# Patient Record
Sex: FEMALE | Race: WHITE | Employment: OTHER | URBAN - METROPOLITAN AREA
[De-identification: names, ages, dates, MRNs, and addresses within clinical notes are randomized per-mention and may not be internally consistent; named-entity substitution may affect disease eponyms.]

---

## 2018-08-07 ENCOUNTER — OFFICE VISIT (OUTPATIENT)
Dept: AUDIOLOGY | Facility: CLINIC | Age: 73
End: 2018-08-07
Payer: MEDICARE

## 2018-08-07 DIAGNOSIS — H90.3 SENSORINEURAL HEARING LOSS, BILATERAL: Primary | ICD-10-CM

## 2018-08-07 PROCEDURE — 92567 TYMPANOMETRY: CPT | Performed by: AUDIOLOGIST

## 2018-08-07 PROCEDURE — 92557 COMPREHENSIVE HEARING TEST: CPT | Performed by: AUDIOLOGIST

## 2018-08-07 PROCEDURE — 92593 HEARING AID CHECK, BOTH EARS: CPT | Performed by: AUDIOLOGIST

## 2018-08-08 NOTE — PROGRESS NOTES
AUDIOGRAM     Gwen Segovia was referred for testing by Dr. Trice Lucero  7/22/1945  [id number]        History  Patient has been wearing hearing aids since 2014. She admits to not wearing consistently.    Recently she has been hearing an \"echo\" in her left e aids  Cleaned earmolds  Suctioned microphone ports  Suctioned  ports  Placed aids in /dehumidifier  Changed wax guards  Cleaned battery doors and contacts    [facility] Ear testing revealed excellent aided benefit for both ear    Listening check

## 2018-11-18 NOTE — LETTER
1/16/2024      Real Razo MD  Physical Medicine and Rehabilitation  [address] [place] [address], [address]  [facility] [address]  Dept: [phone]  Dept Fax: [phone]        RE: Consultation for Alejandra Green        Dear Erin Lyles MD,    Thank you very much for the opportunity to see your patient.  Attached please find a summary from your patient's recent visit.     I appreciate the chance to take care of your patient with you.  Please feel free to call me with any questions or concerns.    Sincerely,        Real Razo MD  Electronically Signed on 1/16/2024        6

## 2024-01-08 ENCOUNTER — MED REC SCAN ONLY (OUTPATIENT)
Dept: PHYSICAL MEDICINE AND REHAB | Facility: CLINIC | Age: 79
End: 2024-01-08

## 2024-01-08 ENCOUNTER — TELEPHONE (OUTPATIENT)
Dept: PHYSICAL MEDICINE AND REHAB | Facility: CLINIC | Age: 79
End: 2024-01-08

## 2024-01-16 ENCOUNTER — TELEPHONE (OUTPATIENT)
Dept: PHYSICAL MEDICINE AND REHAB | Facility: CLINIC | Age: 79
End: 2024-01-16

## 2024-01-16 ENCOUNTER — OFFICE VISIT (OUTPATIENT)
Dept: PHYSICAL MEDICINE AND REHAB | Facility: CLINIC | Age: 79
End: 2024-01-16
Payer: MEDICARE

## 2024-01-16 DIAGNOSIS — M48.02 CERVICAL STENOSIS OF SPINE: ICD-10-CM

## 2024-01-16 DIAGNOSIS — M50.90 CERVICAL DISC DISEASE: ICD-10-CM

## 2024-01-16 DIAGNOSIS — M54.2 NECK PAIN ON RIGHT SIDE: ICD-10-CM

## 2024-01-16 DIAGNOSIS — M47.812 ARTHROPATHY OF CERVICAL FACET JOINT: Primary | ICD-10-CM

## 2024-01-16 DIAGNOSIS — Q76.1 CERVICAL FUSION SYNDROME: ICD-10-CM

## 2024-01-16 DIAGNOSIS — G44.86 CERVICOGENIC HEADACHE: ICD-10-CM

## 2024-01-16 PROCEDURE — 99205 OFFICE O/P NEW HI 60 MIN: CPT | Performed by: PHYSICAL MEDICINE & REHABILITATION

## 2024-01-16 NOTE — PATIENT INSTRUCTIONS
Plan  I will do right C2-3 and C3-4 z-joint injections.    She will get into the PT.    If the above does not help, then she might need to have a C7-T1 ILESI again.    The patient will follow up in 2-3 months, but the patient will call me 2 weeks after having the injection to let me know how the injection worked.    The total office visit face-to-face visit time was at least 60 minutes with over half of the time spent discussing patient care and treatment.

## 2024-01-16 NOTE — TELEPHONE ENCOUNTER
Patient has been scheduled for  Right C2-3 and C3-4 z-joint injections  on 1/26/2024 at the [facility] with .   -Anesthesia type: local.  -If scheduling [facility] covid testing required for all procedures whether patient is vaccinated or not.  -Patient informed not to eat or drink anything after midnight the night prior to the procedure, if being sedated. (For afternoon injections: Patient to fast 6-8 hours prior to procedure with IVCS/MAC. )  -Patient was advised that if he/she does receive the covid vaccine it needs to be at least 2 weeks before or after the injection.  -Medications and allergies reviewed.  -Patient reminded to hold NSAIDs (Ibuprofen, ASA 81, Aleve, Naproxen, Mobic, Diclofenac, Etodolac, Celebrex etc.) for 3 days prior to LUMBAR FACET JOINT INJECTIONS OR MEDIAL BRANCH BLOCK INJECTIONS  if BMI is greater than 35. For Cervical injections only hold multivitamins, Vitamin E, Fish Oil, Phentermine (Lomaira) for 7 days prior to injection and NSAIDS.   mg to be held for 7 days prior to injections.  -If patient is receiving MAC/IVCS Phentermine (Lomaira), Adlyxin (Lixisenatide), Bydureon BCise (Exenatide-release), Byetta (Exenatide), Mounjaro (Tirezepatide), Ozempic (Semaglutide), Rybelsus (oral demaglutide), Saxenda (Liraglutide), Trulicity (Dulaglutide), Victoriza (Liraglutide), Wegovy (Semaglutide), Berberine (oral natures ozempic) will need to be held for 7 days prior to injection.  -If patient's BMI is greater than 50. Patient is unable to get IVCS/MAC at [facility]. (Options: Patient can go to [facility] under MAC or ENDO under IVCS-reminder physician's slots at ENDO are limited. OR Patient can have the procedure done under local anesthesia at [facility] with Valium ( per physician's preference.)    -If on blood thinner clearance has been received to hold this medication by provider.   -Patient informed he/she will need a  to and from procedure. EFFECTIVE 12/1/23- Per [facility]: \" Our PAT team will notify the  patient that their ride MUST remain onsite for the entirety of their visit if the ride is unable to do so the procedure will be canceled. \"    -[facility] is located in the [facility] 1st floor. Patient may park in the yellow or purple parking.    Follow up appointment has been scheduled for patient on: 04/17/2024    Patient verbalized understanding and agrees with plan.  -----> Scheduled in Epic: Yes  -----> Scheduled in [employer]/Surgical Case Request: Yes
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
## 2024-01-16 NOTE — TELEPHONE ENCOUNTER
Per CMS Guidelines -no authorization is required for Right C2-3 and C3-4 z-joint injections CPT 83152, 84793 when being performed at an ASC    Status: Authorization is not required based on medical necessity at an ASC however may be subject to review once claim is submitted-Covered Benefit

## 2024-01-17 ENCOUNTER — TELEPHONE (OUTPATIENT)
Dept: PHYSICAL MEDICINE AND REHAB | Facility: CLINIC | Age: 79
End: 2024-01-17

## 2024-01-17 NOTE — TELEPHONE ENCOUNTER
Progress notes from [facility] and report from MRI cervical spine (5/18/23.)    Records sent to scan.

## 2024-01-25 ENCOUNTER — TELEPHONE (OUTPATIENT)
Dept: PHYSICAL THERAPY | Facility: HOSPITAL | Age: 79
End: 2024-01-25

## 2024-01-30 ENCOUNTER — TELEPHONE (OUTPATIENT)
Dept: NEUROLOGY | Facility: CLINIC | Age: 79
End: 2024-01-30

## 2024-01-30 ENCOUNTER — OFFICE VISIT (OUTPATIENT)
Dept: PHYSICAL THERAPY | Age: 79
End: 2024-01-30
Attending: PHYSICAL MEDICINE & REHABILITATION
Payer: MEDICARE

## 2024-01-30 DIAGNOSIS — M48.02 CERVICAL STENOSIS OF SPINE: ICD-10-CM

## 2024-01-30 DIAGNOSIS — Q76.1 CERVICAL FUSION SYNDROME: ICD-10-CM

## 2024-01-30 DIAGNOSIS — M54.2 NECK PAIN ON RIGHT SIDE: ICD-10-CM

## 2024-01-30 DIAGNOSIS — M47.812 ARTHROPATHY OF CERVICAL FACET JOINT: Primary | ICD-10-CM

## 2024-01-30 DIAGNOSIS — M50.90 CERVICAL DISC DISEASE: ICD-10-CM

## 2024-01-30 DIAGNOSIS — G44.86 CERVICOGENIC HEADACHE: ICD-10-CM

## 2024-01-30 PROCEDURE — 97161 PT EVAL LOW COMPLEX 20 MIN: CPT

## 2024-01-30 PROCEDURE — 97110 THERAPEUTIC EXERCISES: CPT

## 2024-01-30 PROCEDURE — 97140 MANUAL THERAPY 1/> REGIONS: CPT

## 2024-01-30 NOTE — PROGRESS NOTES
SPINE EVALUATION:     Diagnosis:        Associated DX:  Arthropathy of cervical facet joint (M47.812)  Neck pain on right side (M54.2)  Cervicogenic headache (G44.86)  Cervical fusion syndrome (Q76.1)  Cervical disc disease (M50.90)  Cervical stenosis of spine (M48.02)            Referring Provider: Dung  Date of Evaluation:    1/30/2024    Precautions:  None Next MD visit:   none scheduled  Date of Surgery: n/a     PATIENT SUMMARY   Alejandra Green is a R dominant 78 year old female who presents to therapy today with complaints of chronic ( 2 years)  R neck and head pain. Describes head pain as constant, daily, R occipital to temporal region. Head and neck  is 95% R side, 5* L side. Denies any N/T or radiating pain. Has had MRI's, injections, PT, various speialist, all with no noticeable improvement. Pt had recent injections by Dr. Razo at the Right C2-3 and C3-4 zygapophyseal joints. No noticeable change with pain at this time.         Pt describes pain level current 6/10, at best 6/10, at worst 8/10   Current functional limitations include some difficulty with more strenuous tasks. Difficulty sleeping but not necessarily from head/neck pain.   Alejandra describes prior level of function  [place]. Keeps busy with house work and daily walks for exercise daily when weather permits. .     Pt goals include decrease/resolve pain complaints..    Past medical history was reviewed with Alejandra. Significant findings include Sensorineural hearing loss, bilateral, Cervical fusion syndrome: C4-5 ACDF    Arthropathy of cervical facet joint: right mod-severe C2-3, C3-4 right mod  Cervicogenic headache  C3-4 mild diffuse, C5-6 mod-large diffuse, C6-7 mod diffuse bulging discs  C5-6 mod-severe, C6-7 mild-mod central stenosis    Lumbar fusion and laminectomy   Pt denies diplopia, dysarthria, dysphasia, dizziness, drop attacks, bowel/bladder changes, saddle anesthesia, and KIMBERLY LE N/T.    ASSESSMENT  Alejandra is a R dominant 78  year old female who presents to therapy today with complaints of chronic ( 2 years)  R neck and head pain. Describes head pain as constant, daily, R occipital to temporal region.  Denies any N/T or radiating pain. Has had MRI's, injections, PT, various speialist, all with no noticeable improvement. Pt had recent injections by Dr. Razo at the Right C2-3 and C3-4 zygapophyseal joints. No noticeable change with pain at this time.       The results of the objective tests and measures show symptoms unchanged with testing. No change with cervical motions, cervical speciality tests,  cervical spine segment mobility testing, upper cervical spine instability tests, upper cervical spine mobilizations. Pt demonstrates loss of cervical ROM and upper and lower cervical spine segment mobility, decreased cervical spine strength, pain at upper cervical segments with palpation and mobilizations.      Functional deficits include but are not limited to some difficulty with more strenuous activities.  Signs and symptoms suggest diagnosis of cervicogenic headache. Pt and PT discussed evaluation findings, pathology, POC and HEP.  Pt voiced understanding and performs HEP correctly without reported pain. Skilled Physical Therapy is medically necessary to address the above impairments and reach functional goals.     OBJECTIVE:   Observation/Posture: overall good posture in sitting in standing, no guarding of head motions  Neuro Screen: negative    Cervical spine AROM:   Flexion: full motion  Extension: 45*  Sidebendin* each  Rotation: R 45; L 50    Accessory motion: hypomobile upper segments and C/T segments   Palpation: pain at C2/C3    Strength:   UE/Scapular   B UE WFL's    Cervical spine assessed with biofeedback cuff with cranial flexion, baseline 20 mmhg. Was able to progress to 22 mmhg.  30 is WNL       Special tests:   Cervical special tests all negative for change including cervical compression in neutral and side bend,  cervical distraction. Upper cervical spine instability testing of alar and transverse ligaments, C1 compression    Gait: unremarkable    Today’s Treatment and Response:   Pt education was provided on exam findings, treatment diagnosis, treatment plan, expectations, and prognosis. Tx was upper cervical P/A's in sitting and in prone, grade 2,3 followed by cervical strengthening and upper cervical segment mobility with chin nods in supine, biofeedback cuff , baseline 20 mmhg. Was able to progress to 24 mmhg and 10 sec holds x 6 reps for HEP. HO issued for HEP.  No change in symptoms. .       Charges: PT Eval Low Complexity, man 1 TE 1      Total Timed Treatment: 25 min     Total Treatment Time: 50 min   PLAN OF CARE:    Goals: (to be met in 8-10 visits)   -Improve cervical spine strength to WNL's as noted with biofeedback cuff value of 30 mmhg or > with performing cranial flexion, decrease head/neck pain complaints by 50% during stationary activities such as reading.  -Improve cervical rotation AROM to 60* or > to ease head/neck pain complaints by 50% while performing her daily activities including ADL's and house chores, operating car.  -Improve cervical spine extension ROM to 60* or >,ease head/neck pain complaints by 50%, so pt can look up into overhead cabinets or shelves with decreased pain.  -independent with progressive HEP    Frequency / Duration: Patient will be seen for 1-2 x/week or a total of 8-10 visits over a 90 day period. Treatment will include: Manual Therapy, Neuromuscular Re-education, Therapeutic Exercise, and Home Exercise Program instruction    Education or treatment limitation: None  Rehab Potential:fair    Neck Disability Index Score  Score: 32 % (1/29/2024 11:11 AM)      Patient/Family/Caregiver was advised of these findings, precautions, and treatment options and has agreed to actively participate in planning and for this course of care.    Thank you for your referral. Please co-sign or sign  and return this letter via fax as soon as possible to [phone]. If you have any questions, please contact me at Dept: [phone]    Sincerely,  Electronically signed by therapist: Jairon Brito PT    Physician's certification required: Yes  I certify the need for these services furnished under this plan of treatment and while under my care.    X___________________________________________________ Date____________________    Certification From: 1/30/2024  To:4/29/2024

## 2024-01-30 NOTE — TELEPHONE ENCOUNTER
Patient calling to request a 2 week f/u apt she was informed by  to book a 2 week apt she was seen by  on 1/26/24. Please call to advice.

## 2024-01-31 NOTE — TELEPHONE ENCOUNTER
Per  at [facility] 1/16/24: \"The patient will follow up in 2-3 months, but the patient will call me 2 weeks after having the injection to let me know how the injection worked.\"    Patient completed Right C2-3 and C3-4 zygapophyseal joint injections on 1/26/24.    Informed patient of the above plan from [facility] with . Patient understood and she will call back 2 weeks post injection with an update.     Nothing further needed at this time.

## 2024-02-01 ENCOUNTER — OFFICE VISIT (OUTPATIENT)
Dept: PHYSICAL THERAPY | Age: 79
End: 2024-02-01
Attending: PHYSICAL MEDICINE & REHABILITATION
Payer: MEDICARE

## 2024-02-01 PROCEDURE — 97140 MANUAL THERAPY 1/> REGIONS: CPT

## 2024-02-01 PROCEDURE — 97110 THERAPEUTIC EXERCISES: CPT

## 2024-02-02 NOTE — PROGRESS NOTES
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)  Neck pain on right side (M54.2)  Cervicogenic headache (G44.86)  Cervical fusion syndrome (Q76.1)  Cervical disc disease (M50.90)  Cervical stenosis of spine (M48.02)         Referring Provider: Dung  Date of Evaluation:   2024    Precautions:  None Next MD visit:   none scheduled  Date of Surgery: n/a   Insurance Primary/Secondary: MEDICARE / [employer] INS       Total Timed Treatment: 40 min Total Treatment time: 40 min  Charges: man 2 TE 1         Treatment Number: 2 of 10    Subjective: Complaints of chronic ( 2 years)  R neck and head pain. Describes head pain as constant, daily, R occipital to temporal region. Head and neck  is 95% R side, 5* L side. Denies any N/T or radiating pain. Has had MRI's, injections, PT, various speialist, all with no noticeable improvement. Pt had recent injections by Dr. Razo at the Right C2-3 and C3-4 zygapophyseal joints. No noticeable change with pain at this time.         Pt describes pain level current 6/10, at best 6/10, at worst 8/10      Objective:  Flexion: full motion  Extension: 45*  Sidebendin* each  Rotation: R 45; L 50    Accessory motion: hypomobile upper segments and C/T segments     Palpation: pain at R C2/C3 pillars  pain at multiple tissue nodules, minimal to moderate size at R scalenes    Manual PT: 25 min total  Supine:  Tissue mobilization to all 3 R scalenes, focus on muscle bellies and proximal attachments 15 min  Occipital release x 3 min total  Grade 2 mobes central PA to C2/C3  Grade 3,4 mobes C2/C3 R and L rotation  Grade 2 C1 lateral glides L and R     TE:  Supine:e  L and R scalene stretches with 30 sec holds, all 3, 1 and 2nd rib fixated 8 minutes  L and R rotation PROM x 5 each    cervical strengthening and upper cervical segment mobility with chin nods in supine, 10 sec holds x 7 reps cont HEP    Sit:  L and R cervical rotation to end range with 5 sec hold x 3 each  add HEP        HEP: as noted above    Assessment: tx addressed painful and restrictive cervical musculature that may be contributing to pt's pain complaints. During tissue mobilization pt reported R occipital and orbit region pain that resolved with decrease pressure with tissue mobilization. R rotation improved to 50* following tx, goal 2, extension improved to 55 following tx, goal 3. HEP reviewed and progressed as noted above to maintain progress made today. No noticeable change in head pain following tx.     Goals: (to be met in 8-10 visits)   -Improve cervical spine strength to WNL's as noted with biofeedback cuff value of 30 mmhg or > with performing cranial flexion, decrease head/neck pain complaints by 50% during stationary activities such as reading.  -Improve cervical rotation AROM to 60* or > to ease head/neck pain complaints by 50% while performing her daily activities including ADL's and house chores, operating car.  -Improve cervical spine extension ROM to 60* or >,ease head/neck pain complaints by 50%, so pt can look up into overhead cabinets or shelves with decreased pain.  -independent with progressive HEP      Neck Disability Index Score  Score: 32 % (1/29/2024 11:11 AM)    Plan:check for symptoms changes, continue with current POC and initiate C/T spine segment mobility tx.

## 2024-02-05 ENCOUNTER — OFFICE VISIT (OUTPATIENT)
Dept: PHYSICAL THERAPY | Age: 79
End: 2024-02-05
Attending: PHYSICAL MEDICINE & REHABILITATION
Payer: MEDICARE

## 2024-02-05 PROCEDURE — 97110 THERAPEUTIC EXERCISES: CPT

## 2024-02-05 PROCEDURE — 97140 MANUAL THERAPY 1/> REGIONS: CPT

## 2024-02-05 NOTE — PROGRESS NOTES
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)  Neck pain on right side (M54.2)  Cervicogenic headache (G44.86)  Cervical fusion syndrome (Q76.1)  Cervical disc disease (M50.90)  Cervical stenosis of spine (M48.02)         Referring Provider: Dung  Date of Evaluation:   2024    Precautions:  Cervical fusion  C4-5 ACDF      Next MD visit:   none scheduled  Date of Surgery: n/a   Insurance Primary/Secondary: MEDICARE / [employer]       Total Timed Treatment: 40 min Total Treatment time: 40 min  Charges: man 2 TE 1         Treatment Number: 3 of 10    Subjective:   Pt reports that she had  of couple of days of decreased R head and neck pain following last tx. Pain rebounded to increased levels all day yesterday, NSAIDS did not help. At baseline today with pain.     Pt describes pain level current 6/10, at best 6/10, at worst 8/10      Objective:  Flexion: full motion  Extension: 45*  Sidebendin* each  Rotation: R 45; L 50    Accessory motion: hypomobile upper segments and C/T segments     Palpation: pain at R C2/C3 pillars  pain at multiple tissue nodules, minimal to moderate size at R scalenes    Manual PT: 25 min total  Supine:  Tissue mobilization to all 3 R scalenes, focus on muscle bellies and proximal attachments 15 min  Occipital release x 3 min total  Grade 2 mobes central PA to C2/C3  Grade 3,4 mobes C2/C3 R and L rotation  Grade 2 C1 lateral glides L and R     TE:  Supine:e  L and R scalene stretches with 30 sec holds, all 3, 1 and 2nd rib fixated 5 minutes  L and R rotation PROM x 5 each    cervical strengthening and upper cervical segment mobility with chin nods in supine, 10 sec holds x 7 reps   Cervical flexion AROM with chin nod maintained x 5 reps 2 sets progress HEP    Sit:  Thoracic spine extension with overpressure with hand cuff at neck, arch over chair x 10 reps 2 sets add HEP  L and R cervical rotation to end range with 5 sec hold x 3 each cont HEP        HEP:  as noted above    Assessment:  pt reports noticeable change in R head and neck pain following last tx. No carryover with R rotation ROM from last session but L and R rotation improved post tx to 55* each, goal 2, extension improved to 60 following tx, goal 3. HEP was progressed as noted above to maintain progress made at today's tx. HO issued for additional HEP.     HEP reviewed and progressed as noted above to maintain progress made today. No noticeable change in head pain following tx     Goals: (to be met in 8-10 visits)   -Improve cervical spine strength to WNL's as noted with biofeedback cuff value of 30 mmhg or > with performing cranial flexion, decrease head/neck pain complaints by 50% during stationary activities such as reading.  -Improve cervical rotation AROM to 60* or > to ease head/neck pain complaints by 50% while performing her daily activities including ADL's and house chores, operating car.  -Improve cervical spine extension ROM to 60* or >,ease head/neck pain complaints by 50%, so pt can look up into overhead cabinets or shelves with decreased pain.  -independent with progressive HEP      Neck Disability Index Score  Score: 32 % (1/29/2024 11:11 AM)    Plan:check for symptoms changes, continue with current POC and initiate C/T spine segment mobility tx.

## 2024-02-08 ENCOUNTER — OFFICE VISIT (OUTPATIENT)
Dept: PHYSICAL THERAPY | Age: 79
End: 2024-02-08
Attending: PHYSICAL MEDICINE & REHABILITATION
Payer: MEDICARE

## 2024-02-08 PROCEDURE — 97110 THERAPEUTIC EXERCISES: CPT

## 2024-02-08 PROCEDURE — 97140 MANUAL THERAPY 1/> REGIONS: CPT

## 2024-02-08 NOTE — PROGRESS NOTES
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 Did have a couple of days of decreased R head and neck pain following two treatments ago. Pain  at baseline today, 6/10.      Pt describes pain level current 6/10, at best 6/10, at worst 8/10      Objective:  Flexion: full motion  Extension: 50*  Sidebendin* each  Rotation: R 55; L 55    Accessory motion: hypomobile upper segments and C/T segments     Palpation: pain at R C2/C3 pillars  pain at multiple tissue nodules, minimal to moderate size at R scalenes/SCM    Manual PT: 25 min total  Supine:  Tissue mobilization to all 3 R scalenes, focus on muscle bellies and proximal attachments 15 min  Occipital release x 3 min total  Grade 2 mobes central PA to C2/C3  Grade 3,4 mobes C2/C3 R and L rotation  Grade 2 C1 lateral glides L and R     TE:  Supine:  L and R scalene stretches with 30 sec holds, all 3, 1 and 2nd rib fixated 5 minutes  L and R rotation PROM x 5 each  Cervical flexion AROM with chin nod maintained x 5 reps 2 sets progress HEP    Sit:  Thoracic spine extension with overpressure with hand cuff at neck, arch over chair x 10 reps 2 sets  HEP  L and R upper trap/lateral neck musculature stretch 20 sec hold x 2 each add HEP  L and R cervical rotation to end range with 5 sec hold x 3 each cont HEP        HEP: as noted above    Assessment:  Pt reports no noticeable  change in R head and neck pain following previous tx. Cervical rotation and extension ROM improving. Post tx promote cervical tissue mobility, decrease tension at latera neck musculature.  HO issued for additional HEP.     HEP reviewed and progressed as noted above to maintain progress made today. No noticeable change in head pain following tx     Goals: (to be met in 8-10 visits)   -Improve cervical spine strength to WNL's as noted with biofeedback cuff value of 30 mmhg or > with performing cranial flexion, decrease head/neck pain complaints by 50% during stationary activities such as reading.  -Improve cervical rotation AROM to 60* or > to ease head/neck pain complaints by 50% while performing her daily activities including ADL's and house chores, operating car.  -Improve cervical spine extension ROM to 60* or >,ease head/neck pain complaints by 50%, so pt can look up into overhead cabinets or shelves with decreased pain.  -independent with progressive HEP      Neck Disability Index Score  Score: 32 % (1/29/2024 11:11 AM)    Plan:check for symptoms changes, continue with current POC and initiate C/T spine segment mobility tx if appropriate.

## 2024-02-12 ENCOUNTER — APPOINTMENT (OUTPATIENT)
Dept: PHYSICAL THERAPY | Age: 79
End: 2024-02-12
Attending: PHYSICAL MEDICINE & REHABILITATION
Payer: MEDICARE

## 2024-02-12 ENCOUNTER — TELEPHONE (OUTPATIENT)
Dept: PHYSICAL THERAPY | Facility: HOSPITAL | Age: 79
End: 2024-02-12

## 2024-02-13 ENCOUNTER — TELEPHONE (OUTPATIENT)
Dept: PHYSICAL MEDICINE AND REHAB | Facility: CLINIC | Age: 79
End: 2024-02-13

## 2024-02-13 NOTE — TELEPHONE ENCOUNTER
Pt phoned Clinical staff to give a condition update and to say that she is a little better ,moments of better, moments of pain level of 8 out of 10. Pt is doing PT 2 X weekly and Pt states that it is hard, really hard.

## 2024-02-19 ENCOUNTER — OFFICE VISIT (OUTPATIENT)
Dept: PHYSICAL THERAPY | Age: 79
End: 2024-02-19
Attending: PHYSICAL MEDICINE & REHABILITATION
Payer: MEDICARE

## 2024-02-19 PROCEDURE — 97110 THERAPEUTIC EXERCISES: CPT

## 2024-02-19 PROCEDURE — 97140 MANUAL THERAPY 1/> REGIONS: CPT

## 2024-02-19 NOTE — TELEPHONE ENCOUNTER
Called and spouse Abdullahi answered. Pt was in PT and gave number to call back for a condition update. -1st attempt

## 2024-02-19 NOTE — TELEPHONE ENCOUNTER
Condition update after Procedure.    - Patient had Right C2-3 and C3-4 z-joint injections  (specific procedure) on 1/26/2024 (date) with .  - 10% relief. Pt states that she has moments of relief but however pain is the same.     Pt started PT which seems to be beneficial. She wanted a sooner appointment with Dr Razo, however she wanted to be able to try out some more sessions of PT.     Rescheduled patient for an office visit on 3/20/2024.     Pt verbalized understanding. No further actions needed at this time. Closing the encounter.

## 2024-02-19 NOTE — PROGRESS NOTES
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 Was t see secondary PT last week for tx but session cancelled due to PT ill.     Pt describes pain level current 6/10, at best 6/10, at worst 8/10      Objective:  Flexion: full motion  Extension: 50*  Sidebendin* each  Rotation: R 55; L 55    Accessory motion: hypomobile upper segments and C/T segments     Palpation: pain at R C2/C3 pillars  pain at multiple tissue nodules, minimal to moderate size at R scalenes/SCM    Manual PT: 25 min total  Supine:  Tissue mobilization to all 3 R scalenes, focus on muscle bellies and proximal attachments 15 min  Occipital release x 3 min total  Grade 2 mobes central PA to C2/C3  Grade 3,4 mobes C2/C3 R and L rotation  Grade 2 C1 lateral glides L and R     TE:  Supine:  L and R scalene stretches with 30 sec holds, all 3, 1 and 2nd rib fixated 5 minutes  L and R rotation PROM x 5 each  Cervical flexion AROM with chin nod maintained x 5 reps 2 sets  HEP    Sit:  Thoracic spine extension with overpressure with hand cuff at neck, arch over chair x 10 reps 2 sets  HEP  L and R upper trap/lateral neck musculature stretch 20 sec hold x 2 each  HEP  L and R cervical rotation to end range with 5 sec hold x 3 each cont HEP        HEP: as noted above    Assessment:  Pt reports no noticeable improvement since last seen. Did have increased R  tissue tension and painful nodules at scalenes. Tissue mobilization and stretch of anterior and medial scalenes did produce pain at frontal lobe during stretch and resolved after L and R rotation improved to 60* each after tx, goal 2.    No noticeable change in head pain following tx. Independent with current hep.      Goals: (to be met in 8-10 visits)   -Improve cervical spine strength to WNL's as noted with biofeedback cuff value of 30 mmhg or > with performing cranial flexion, decrease head/neck pain complaints by 50% during stationary activities such as reading.  -Improve cervical rotation AROM to 60* or > to ease head/neck pain complaints by 50% while performing her daily activities including ADL's and house chores, operating car.  -Improve cervical spine extension ROM to 60* or >,ease head/neck pain complaints by 50%, so pt can look up into overhead cabinets or shelves with decreased pain.  -independent with progressive HEP      Neck Disability Index Score  Score: 32 % (1/29/2024 11:11 AM)    Plan:check for symptoms changes, continue with current POC and initiate C/T spine segment mobility tx if appropriate, address goals.

## 2024-02-22 ENCOUNTER — OFFICE VISIT (OUTPATIENT)
Dept: PHYSICAL THERAPY | Age: 79
End: 2024-02-22
Attending: PHYSICAL MEDICINE & REHABILITATION
Payer: MEDICARE

## 2024-02-22 PROCEDURE — 97110 THERAPEUTIC EXERCISES: CPT

## 2024-02-22 PROCEDURE — 97140 MANUAL THERAPY 1/> REGIONS: CPT

## 2024-02-23 NOTE — PROGRESS NOTES
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)  Neck pain on right side (M54.2)  Cervicogenic headache (G44.86)  Cervical fusion syndrome (Q76.1)  Cervical disc disease (M50.90)  Cervical stenosis of spine (M48.02)         Referring Provider: Dung  Date of Evaluation:   2024    Precautions:  Cervical fusion  C4-5 ACDF      Next MD visit:   none scheduled  Date of Surgery: n/a    Insurance Primary/Secondary: MEDICARE / [employer]       Total Timed Treatment: 40 min Total Treatment time: 40 min  Charges: man 1 TE 2         Tretment Number: 6 of 10    Subjective:   Reports no improvement since last seen 10 days ago. Was t see secondary PT last week for tx but session cancelled due to PT ill.     Pt describes pain level current 6/10, at best 6/10, at worst 8/10      Objective:  Flexion: full motion  Extension: 50*  Sidebendin* each  Rotation: R 55; L 55    Accessory motion: hypomobile upper segments and C/T segments     Palpation: pain at R C2/C3 pillars  pain at multiple tissue nodules, minimal to moderate size at R scalenes/SCM    Manual PT: 20 min total  Supine:  Tissue mobilization to all 3 R scalenes, focus on muscle bellies and proximal attachments 15 min  Occipital release x 3 min total  Grade 2 mobes central PA to C2/C3  Grade 3,4 mobes C2/C3 R and L rotation  Grade 2 C1 lateral glides L and R     TE: 25 min  Supine:  L and R scalene stretches with 30 sec holds, all 3, 1 and 2nd rib fixated 5 minutes  L and R rotation PROM x 5 each  Cervical flexion AROM with chin nod maintained x 5 reps 2 sets  HEP    Hook lying:  B UE in 90/90 position, hands contact table, perform overhead abduction slides x 10 2 sets add HEP    Sit:  Thoracic spine extension with overpressure with hand cuff at neck, arch over chair x 10 reps 2 sets  HEP  L and R upper trap/lateral neck musculature stretch 20 sec hold x 2 each  HEP  L and R cervical rotation to end range with 5 sec hold x 3 each cont HEP     Above rotation performed with grade 3,4 mobes to C/T segments for L/R rotation, at end range motions.   Stand:  B UE in 90/90 position, fingertips contact wall, perform overhead abduction slides x 5 3 sets add HEP      HEP: as noted above    Assessment:  Other than 1 episode of noticeable improvement with head and neck complaints following a previous treatment progress remains slow. Numerous painful tissue nodules at R neck musculature, hypomobile upper cervical segments and cervical spine weakness have all well improved along with ROM, 1,2,3. Latest HEP was reviewed, done independently. Program was advanced with exercises to promote upward scapula mobility and pec/latissimus length to decrease upper trap compensation/shrug with overhead arm elevation which was noted today. HO issued for additional HEP.      L and R rotation improved to 60*/65* each after tx, goal 2.    No noticeable change in head pain following tx. Independent with current hep.      Goals: (to be met in 8-10 visits)   -Improve cervical spine strength to WNL's as noted with biofeedback cuff value of 30 mmhg or > with performing cranial flexion, decrease head/neck pain complaints by 50% during stationary activities such as reading.  -Improve cervical rotation AROM to 60* or > to ease head/neck pain complaints by 50% while performing her daily activities including ADL's and house chores, operating car.  -Improve cervical spine extension ROM to 60* or >,ease head/neck pain complaints by 50%, so pt can look up into overhead cabinets or shelves with decreased pain.  -independent with progressive HEP      Neck Disability Index Score  Score: 32 % (1/29/2024 11:11 AM)    Plan:check for symptoms changes, continue with current POC and initiate C/T spine segment mobility tx if appropriate, address goals.

## 2024-02-26 ENCOUNTER — OFFICE VISIT (OUTPATIENT)
Dept: PHYSICAL THERAPY | Age: 79
End: 2024-02-26
Attending: PHYSICAL MEDICINE & REHABILITATION
Payer: MEDICARE

## 2024-02-26 PROCEDURE — 97110 THERAPEUTIC EXERCISES: CPT

## 2024-02-26 PROCEDURE — 97140 MANUAL THERAPY 1/> REGIONS: CPT

## 2024-02-26 NOTE — PROGRESS NOTES
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)  Neck pain on right side (M54.2)  Cervicogenic headache (G44.86)  Cervical fusion syndrome (Q76.1)  Cervical disc disease (M50.90)  Cervical stenosis of spine (M48.02)         Referring Provider: Dung  Date of Evaluation:   2024    Precautions:  Cervical fusion  C4-5 ACDF      Next MD visit:   none scheduled  Date of Surgery: n/a    Insurance Primary/Secondary: MEDICARE / [employer]       Total Timed Treatment: 40 min Total Treatment time: 40 min  Charges: man 1 TE 2         Tretment Number: 6 of 10    Subjective:   pt reports that she has noticed R head pain complaints diminished since last tx, better. Still with some R neck soreness at times but some improvement now. Has c/o  top of head pain and irritation the past 1-2 days. Doing HEP as asked.     Pt describes pain level current 6/10, at best 6/10, at worst 8/10      Objective:  Flexion: full motion  Extension: 50*  Sidebendin* each  Rotation: R 55; L 55    Accessory motion: hypomobile upper segments and C/T segments     Palpation: pain at R C2/C3 pillars  pain at multiple tissue nodules, minimal to moderate size at R scalenes/SCM    Manual PT: 20 min total  Supine:  Tissue mobilization to all 3 R scalenes, focus on muscle bellies and proximal attachments 15 min  Occipital release x 3 min total  Grade 2 mobes central PA to C2/C3  Grade 3,4 mobes C2/C3 R and L rotation  Grade 2 C1 lateral glides L and R     TE: 25 min  Supine:  L and R scalene stretches with 30 sec holds, all 3, 1 and 2nd rib fixated 5 minutes  L and R rotation PROM x 5 each  Cervical flexion AROM with chin nod maintained x 5 reps progressed to 10 reps  HEP    Hook lying:  B UE in 90/90 position, hands contact table, perform overhead abduction slides x 10 2 sets add HEP    Sit:  Thoracic spine extension with overpressure with hand cuff at neck, arch over chair x 10 reps 2 sets  HEP  L and R upper trap/lateral  neck musculature stretch 20 sec hold x 2 each  HEP  L and R cervical rotation to end range with 5 sec hold x 3 each cont HEP    Above rotation performed with grade 3,4 mobes to C/T segments for L/R rotation, at end range motions.   Stand:  B UE in 90/90 position, fingertips contact wall, perform overhead abduction slides x 5 3 sets add HEP      HEP: as noted above    Assessment:  Pt now reporting R head complaints improving and little to none since last tx, goals 1,2,3. Has newer c/o of top of head low grade pain the past couple of days. That was improved slight ly following tx today. R neck pain also improving with minimal complaints following tx. Cervical spine strength WNL, goal 1.      L and R rotation improved to 60*/65* each after tx, goal 2.        Goals: (to be met in 8-10 visits)   -Improve cervical spine strength to WNL's as noted with biofeedback cuff value of 30 mmhg or > with performing cranial flexion, decrease head/neck pain complaints by 50% during stationary activities such as reading.  -Improve cervical rotation AROM to 60* or > to ease head/neck pain complaints by 50% while performing her daily activities including ADL's and house chores, operating car.  -Improve cervical spine extension ROM to 60* or >,ease head/neck pain complaints by 50%, so pt can look up into overhead cabinets or shelves with decreased pain.  -independent with progressive HEP      Neck Disability Index Score  Score: 32 % (1/29/2024 11:11 AM)    Plan  continue with above POC.

## 2024-02-29 ENCOUNTER — OFFICE VISIT (OUTPATIENT)
Dept: PHYSICAL THERAPY | Age: 79
End: 2024-02-29
Attending: PHYSICAL MEDICINE & REHABILITATION
Payer: MEDICARE

## 2024-02-29 PROCEDURE — 97140 MANUAL THERAPY 1/> REGIONS: CPT

## 2024-02-29 PROCEDURE — 97110 THERAPEUTIC EXERCISES: CPT

## 2024-02-29 NOTE — PROGRESS NOTES
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)  Neck pain on right side (M54.2)  Cervicogenic headache (G44.86)  Cervical fusion syndrome (Q76.1)  Cervical disc disease (M50.90)  Cervical stenosis of spine (M48.02)         Referring Provider: Dung  Date of Evaluation:   2024    Precautions:  Cervical fusion  C4-5 ACDF      Next MD visit:   none scheduled  Date of Surgery: n/a    Insurance Primary/Secondary: MEDICARE / [employer]       Total Timed Treatment: 40 min Total Treatment time: 40 min  Charges: man 1 TE 2         Tretment Number: 6 of 10    Subjective:   Pt reports no carryover from previous session with R occiput region and R neck pain. Pain steadily returned following last tx to all areas, 7 /10 pain constant.     Pt describes pain level current 7/10, at best 6/10, at worst 8/10      Objective:  Flexion: full motion  Extension: 55*  Sidebendin* each  Rotation: R 55; L 55    Accessory motion: hypomobile upper segments and C/T segments     Palpation: pain at R C2/C3 pillars  pain at multiple tissue nodules, minimal to moderate size at R scalenes/SCM    Manual PT: 20 min total  Supine:  Tissue mobilization to all 3 R scalenes, focus on muscle bellies and proximal attachments 15 min  Occipital release x 3 min total  Grade 2 mobes central PA to C2/C3  Grade 3,4 mobes C2/C3 R and L rotation  Grade 2 C1 lateral glides L and R     TE: 25 min  Supine:  L and R scalene stretches with 30 sec holds, all 3, 1 and 2nd rib fixated 5 minutes  L and R rotation PROM x 5 each  Cervical flexion AROM with chin nod maintained x 5 reps progressed to 10 reps  HEP    Hook lying:  B UE in 90/90 position, hands contact table, perform overhead abduction slides x 10 2 sets add HEP    Sit:  Thoracic spine extension with overpressure with hand cuff at neck, arch over chair x 10 reps 2 sets  HEP  L and R upper trap/lateral neck musculature stretch 20 sec hold x 2 each  HEP  L and R cervical rotation  to end range with 5 sec hold x 3 each cont HEP    Above rotation performed with grade 3,4 mobes to C/T segments for L/R rotation, at end range motions.   Stand:  B UE in 90/90 position, fingertips contact wall, perform overhead abduction slides x 5 3 sets add HEP      HEP: as noted above    Assessment:  Pt had had very good relief of symptoms following #6 tx so treatment duplicated. No carryover, all pain returned. That tx was again duplicated today, HEP reviewed, independent. No changes. Pt does continue to have referred forehead pain at ties when performing tx to upper cervical segment or surrounding soft tissue, trigger posits. Trigger pints nearly resolved. Those symptoms are not occurred with her daily pain complaints. Agan cervical ROM improved after tx versus pre tx. All goals being addressed     L and R rotation improved to 60*/65* each after tx, goal 2.   Extension is 60* after tx goal 3.      Goals: (to be met in 8-10 visits)   -Improve cervical spine strength to WNL's as noted with biofeedback cuff value of 30 mmhg or > with performing cranial flexion, decrease head/neck pain complaints by 50% during stationary activities such as reading.  -Improve cervical rotation AROM to 60* or > to ease head/neck pain complaints by 50% while performing her daily activities including ADL's and house chores, operating car.  -Improve cervical spine extension ROM to 60* or >,ease head/neck pain complaints by 50%, so pt can look up into overhead cabinets or shelves with decreased pain.  -independent with progressive HEP      Neck Disability Index Score  Score: 32 % (1/29/2024 11:11 AM)    Plan  progress accordingly

## 2024-03-04 ENCOUNTER — OFFICE VISIT (OUTPATIENT)
Dept: PHYSICAL THERAPY | Age: 79
End: 2024-03-04
Attending: PHYSICAL MEDICINE & REHABILITATION
Payer: MEDICARE

## 2024-03-04 PROCEDURE — 97140 MANUAL THERAPY 1/> REGIONS: CPT

## 2024-03-04 PROCEDURE — 97110 THERAPEUTIC EXERCISES: CPT

## 2024-03-04 NOTE — PROGRESS NOTES
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)  Neck pain on right side (M54.2)  Cervicogenic headache (G44.86)  Cervical fusion syndrome (Q76.1)  Cervical disc disease (M50.90)  Cervical stenosis of spine (M48.02)         Referring Provider: Dung  Date of Evaluation:   2024    Precautions:  Cervical fusion  C4-5 ACDF      Next MD visit:   none scheduled  Date of Surgery: n/a    Insurance Primary/Secondary: MEDICARE / [employer] INS       Total Timed Treatment: 40 min Total Treatment time: 40 min  Charges: man 1 TE 2         Tretment Number: 8 of 10    Subjective:   Pt reports that there has been some improvement the past several days with head and neck pain. Feels pain during the day is less noticeable. Morning time pain seems to decrease as day progresses then increases in the evening. Feels there has been a 30% improvement overall.     Pt describes pain level current 5/10, at best 5/10, at worst 8/10      Objective:  Flexion: full motion  Extension: 55*  Sidebendin* each  Rotation: R 55; L 55    Accessory motion: hypomobile upper segments and C/T segments     Palpation: pain at R C2/C3 pillars  pain at multiple tissue nodules, minimal to moderate size at R scalenes/SCM    Manual PT: 20 min total  Supine:  Tissue mobilization to all 3 R scalenes, focus on muscle bellies and proximal attachments 15 min  Occipital release x 3 min total  Grade 2 mobes central PA to C2/C3  Grade 3,4 mobes C2/C3 R and L rotation  Grade 2 C1 lateral glides L and R     TE: 25 min  Supine:  L and R scalene stretches with 30 sec holds, all 3, 1 and 2nd rib fixated 5 minutes  L and R rotation PROM x 5 each  Cervical flexion AROM with chin nod maintained x 5 reps progressed to 10 reps  HEP    Hook lying:  B UE in 90/90 position, hands contact table, perform overhead abduction slides x 10 2 sets add HEP    Sit:  Thoracic spine extension with overpressure with hand cuff at neck, arch over chair x 10 reps 2  sets  HEP  L and R upper trap/lateral neck musculature stretch 20 sec hold x 2 each  HEP  L and R cervical rotation to end range with 5 sec hold x 3 each cont HEP    Above rotation performed with grade 3,4 mobes to C/T segments for L/R rotation, at end range motions.   Stand:  B UE in 90/90 position, fingertips contact wall, perform overhead abduction slides x 5 3 sets add HEP      HEP: as noted above    Assessment:  Pt now reporting some consistent decrease in symptoms during the day, see subjective.  Reports 30% better, goals 1,2,3.   L and R rotation improved to 60*/65* each after tx, goal 2.   Extension is 60* after tx goal 3.      Goals: (to be met in 8-10 visits)   -Improve cervical spine strength to WNL's as noted with biofeedback cuff value of 30 mmhg or > with performing cranial flexion, decrease head/neck pain complaints by 50% during stationary activities such as reading.  -Improve cervical rotation AROM to 60* or > to ease head/neck pain complaints by 50% while performing her daily activities including ADL's and house chores, operating car.  -Improve cervical spine extension ROM to 60* or >,ease head/neck pain complaints by 50%, so pt can look up into overhead cabinets or shelves with decreased pain.  -independent with progressive HEP      Neck Disability Index Score  Score: 32 % (1/29/2024 11:11 AM)    Plan  progress accordingly

## 2024-03-11 ENCOUNTER — OFFICE VISIT (OUTPATIENT)
Dept: PHYSICAL THERAPY | Age: 79
End: 2024-03-11
Attending: PHYSICAL MEDICINE & REHABILITATION
Payer: MEDICARE

## 2024-03-11 PROCEDURE — 97140 MANUAL THERAPY 1/> REGIONS: CPT

## 2024-03-11 PROCEDURE — 97110 THERAPEUTIC EXERCISES: CPT

## 2024-03-11 NOTE — PROGRESS NOTES
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)  Neck pain on right side (M54.2)  Cervicogenic headache (G44.86)  Cervical fusion syndrome (Q76.1)  Cervical disc disease (M50.90)  Cervical stenosis of spine (M48.02)         Referring Provider: Dung  Date of Evaluation:   2024    Precautions:  Cervical fusion  C4-5 ACDF      Next MD visit:   none scheduled  Date of Surgery: n/a    Insurance Primary/Secondary: MEDICARE / [employer]       Total Timed Treatment: 40 min Total Treatment time: 40 min  Charges: man 1 TE 2         Tretment Number: 9 of 10    Subjective:   Pt reports that there has been some improvement the past several days with head and neck pain. No 8/10 head or R neck pain, better.  Feels pain during the day is less noticeable. Morning time pain seems to decrease as day progresses then increases in the evening. Feels there has been a 30% improvement overall.     Pt describes pain level current 5/10, at best 5/10, at worst 6-7/10      Objective:  Flexion: full motion  Extension: 55*  Sidebendin* each  Rotation: R 55; L 55    Accessory motion: hypomobile upper segments and C/T segments     Palpation: pain at R C2/C3 pillars  pain at multiple tissue nodules, minimal to moderate size at R scalenes/SCM    Manual PT: 20 min total  Supine:  Tissue mobilization to all 3 R scalenes, focus on muscle bellies and proximal attachments 15 min  Occipital release x 3 min total  Grade 2 mobes central PA to C2/C3  Grade 3,4 mobes C2/C3 R and L rotation  Grade 2 C1 lateral glides L and R     TE: 25 min  Supine:  L and R scalene stretches with 30 sec holds, all 3, 1 and 2nd rib fixated 5 minutes  L and R rotation PROM x 5 each  Cervical flexion AROM with chin nod maintained x 5 reps progressed to 10 reps  HEP    Hook lying:  B UE in 90/90 position, hands contact table, perform overhead abduction slides x 10 2 sets add HEP    Sit:  Thoracic spine extension with overpressure with hand cuff  at neck, arch over chair x 10 reps 2 sets  HEP  L and R upper trap/lateral neck musculature stretch 20 sec hold x 2 each  HEP  L and R cervical rotation to end range with 5 sec hold x 3 each cont HEP    Above rotation performed with grade 3,4 mobes to C/T segments for L/R rotation, at end range motions.   Stand:  B UE in 90/90 position, fingertips contact wall, perform overhead abduction slides x 5 3 sets add HEP      HEP: as noted above    Assessment:  Pt reporting some consistent decrease in symptoms during the day, see subjective.  Reports 30% better, goals 1,2,3.   L and R rotation improved to 60*/65* each after tx, goal 2.   Extension is 60* after tx goal 3.  Painful nodule, trigger points at R cervical musculature still present with some slow to resolve. Head pain can be produced worse at times when treating the trigger points/nodules. May benefit from soft tissue injections. Pt to f/u with Dr. Razo next week.     Goals: (to be met in 8-10 visits)   -Improve cervical spine strength to WNL's as noted with biofeedback cuff value of 30 mmhg or > with performing cranial flexion, decrease head/neck pain complaints by 50% during stationary activities such as reading.  -Improve cervical rotation AROM to 60* or > to ease head/neck pain complaints by 50% while performing her daily activities including ADL's and house chores, operating car.  -Improve cervical spine extension ROM to 60* or >,ease head/neck pain complaints by 50%, so pt can look up into overhead cabinets or shelves with decreased pain.  -independent with progressive HEP      Neck Disability Index Score  Score: 32 % (1/29/2024 11:11 AM)    Plan  reassess and make recommendations.

## 2024-03-18 ENCOUNTER — OFFICE VISIT (OUTPATIENT)
Dept: PHYSICAL THERAPY | Age: 79
End: 2024-03-18
Attending: PHYSICAL MEDICINE & REHABILITATION
Payer: MEDICARE

## 2024-03-18 PROCEDURE — 97140 MANUAL THERAPY 1/> REGIONS: CPT

## 2024-03-18 PROCEDURE — 97110 THERAPEUTIC EXERCISES: CPT

## 2024-03-18 NOTE — PROGRESS NOTES
Post Acute Skilled Nursing Home Discharge Note    Date of Service: 10/21/2019  Location seen at: [facility]    PCP: Claudio Berry MD   Patient Care Team:  Claudio Berry MD as PCP - General  Oswaldo Ornelas MD as Cardiologist (Cardiovascular Disease)  Denis Molina MD as Post Acute Facility Provider: Physician (Geriatric Medicine)  SHERON Kate as Post Acute Facility Provider: APC (Nurse Practitioner)  Seen by SHERON Kate today    History of Present Illness: Tara Juárez is a 66 year old female who presented to Post Acute Skilled Nursing for Rehabilitation. Tara Juárez was at the SNF from 10/10/19 to 10/23/19.     Pertinent Findings:  Tara Juárez is a 66 year old female presenting to Post Acute Skilled Nursing for: Rehab following a hospital stay for AMS thought 2/2 postictal encephalopathy 2l/2 hypernatremia 2/2 lithium. Medications were adjusted and she was discharged here for possible placement.  Her POAHC and brother feels patient is no longer safe to live independently however, patient remains her own decision maker.  [facility]  She has help at home with medications via [facility] Ho whom she refers to as her \"friend\".      History of Present Illness:   Dionna has done well in physical therapy.  She is at baseline functional status.   She will return to her previous living arrangement with  PT/OT.  Her medication list has been updated.      HISTORY  Past Medical, Social, Surgical, and Family History was reviewed with the patient and was updated, as needed. Yes.    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)  Neck pain on right side (M54.2)  Cervicogenic headache (G44.86)  Cervical fusion syndrome (Q76.1)  Cervical disc disease (M50.90)  Cervical stenosis of spine (M48.02)         Referring Provider: Dung  Date of Evaluation:   2024    Precautions:  Cervical fusion  C4-5 ACDF      Next MD visit:   none scheduled  Date of Surgery: n/a    Insurance Primary/Secondary: MEDICARE / [employer] INS       Total Timed Treatment: 40 min Total Treatment time: 40 min  Charges: man 1 TE 2    Physical Therapy Progress Report       Tretment Number: 10 completed    Subjective:   Pt reports that there has been some improvement overall with head and R neck pain. 8/10 head pain has decreased to 5-6/10, better.  Feels pain during the day is less noticeable. Morning time pain seems to decrease as day progresses then increases in the evening. Feels there has been a 30% improvement overall.     Pt describes pain level current 5/10, at best 5/10, at worst 6/10      Objective:  Flexion: full motion  Extension: 55*  Sidebendin* each  Rotation: R 55; L 55    Accessory motion: hypomobile upper segments and C/T segments     Palpation: pain at R C2/C3 pillars  pain at multiple tissue nodules, minimal to moderate size at R scalenes/SCM    Manual PT: 20 min total  Supine:  Tissue mobilization to all 3 R scalenes, focus on muscle bellies and proximal attachments 15 min  Occipital release x 3 min total  Grade 2 mobes central PA to C2/C3  Grade 3,4 mobes C2/C3 R and L rotation  Grade 2 C1 lateral glides L and R     TE: 25 min  Supine:  L and R scalene stretches with 30 sec holds, all 3, 1 and 2nd rib fixated 5 minutes  L and R rotation PROM x 5 each  Cervical flexion AROM with chin nod maintained x 5 reps progressed to 10 reps  HEP    Hook lying:  B UE in 90/90 position, hands contact table, perform overhead abduction slides x 10 2 sets add HEP    Sit:  Thoracic spine  epilepsy   • Osteoporosis   • Vitamin D deficiency   • Aortic valve replacement   • Bipolar 1 disorder (CMS/[facility])   • S/P ankle joint replacement   • Hx of total ankle replacement   • Tibia fracture   • Anemia, unspecified   • Chronic pain   • Acquired spondylolisthesis   • Lumbosacral spondylosis without myelopathy   • Other chronic pain   • Acute paraplegia (CMS/[facility])   • Bipolar disorder (CMS/[facility])   • S/P ankle fusion   • Precordial pain   • Pacemaker, Medtronic, dual chamber    • Bradycardia   • Sinoatrial node dysfunction (CMS/[facility])   • Subarachnoid hemorrhage (CMS/[facility])   • Open displaced fracture of right clavicle   • Multiple falls   • Migraines   • Incontinent of feces   • Bladder incontinence   • Gait abnormality   • Numbness and tingling in both hands   • Bilateral carpal tunnel syndrome   • Paresthesia and pain of both upper extremities   • Impaired mobility   • Generalized weakness   • Seizure disorder (CMS/[facility])   • Toxic metabolic encephalopathy   • Hyperkalemia     ADVANCE DIRECTIVES:  Power of  Status:  Not Activated  Code Status:  DNR (do not resuscitate)  Goals of Care: return home    DEPRESSION SCREENING:  Recent PHQ 2/9 Score    PHQ 2:  Date Adult PHQ 2 Score   9/18/2019 0       PHQ 9:  Date Adult PHQ 9 Score   4/26/2018 24       DEPRESSION ASSESSMENT/PLAN:  Start and/or continue medication.  See orders for details.     ALLERGIES:  Allergies as of 10/21/2019 - Reviewed 10/21/2019   Allergen Reaction Noted   • Morphine ANAPHYLAXIS 05/10/2012   • Percocet [oxycodone-acetaminophen] Other (See Comments) 01/07/2013   • Phytonadione Other (See Comments)    • Vicodin [hydrocodone-acetaminophen] Other (See Comments) 09/27/2015   • Methadone NAUSEA 07/30/2009       CURRENT MEDICATIONS:   Current Outpatient Medications   Medication Sig Dispense Refill   • levetiracetam (KEPPRA) 750 MG tablet Take 1 tablet by mouth every 12 hours.     • aMILoride (MIDAMOR) 5 MG tablet Take 0.5 tablets by mouth 2 times  extension with overpressure with hand cuff at neck, arch over chair x 10 reps 2 sets  HEP  L and R upper trap/lateral neck musculature stretch 20 sec hold x 2 each  HEP  L and R cervical rotation to end range with 5 sec hold x 3 each cont HEP    Above rotation performed with grade 3,4 mobes to C/T segments for L/R rotation, at end range motions.   Stand:  B UE in 90/90 position, fingertips contact wall, perform overhead abduction slides x 5 3 sets add HEP      HEP: as noted above    Assessment:  Pt reporting some consistent decrease in symptoms during the day, see subjective.  Reports 30% better, goals 1,2,3.   L and R rotation improved to 60*/65* each after tx, goal 2.   Extension is 60* after tx goal 3.  Overall good cervical spine ROM and strength.     Painful nodule, trigger points at R cervical musculature still present with some slow to resolve. Head pain can be produced worse at times when treating the trigger points/nodules. May benefit from soft tissue injections.     Pt to f/u with Dr. Razo. Will need POC signed to continue PT.       Goals: (to be met in 10 visits)   -Improve cervical spine strength to WNL's as noted with biofeedback cuff value of 30 mmhg or > with performing cranial flexion, decrease head/neck pain complaints by 50% during stationary activities such as reading.  Not being met for pain  -Improve cervical rotation AROM to 60* or > to ease head/neck pain complaints by 50% while performing her daily activities including ADL's and house chores, operating car. Not being met for pain  -Improve cervical spine extension ROM to 60* or >,ease head/neck pain complaints by 50%, so pt can look up into overhead cabinets or shelves with decreased pain. Not being met for pain  -independent with progressive HEP      Neck Disability Index Score  Score: 32 % (1/29/2024 11:11 AM)    Plan  need POC signed to continue. Pt will follow up  in 2 days. Await recommendations.   daily.     • pantoprazole (PROTONIX) 40 MG tablet Take 1 tablet by mouth daily. 30 tablet 6   • baclofen 5 MG tablet Take 1 tablet by mouth 3 times daily. 90 tablet 1   • clonazePAM (KLONOPIN) 1 MG tablet Take 1 tablet by mouth 2 times daily. 60 tablet 0   • gabapentin (NEURONTIN) 100 MG capsule Take 2 capsules by mouth 3 times daily. 180 capsule 2   • methylphenidate (RITALIN) 20 MG tablet Take 0.5 tablets by mouth daily.  0   • QUEtiapine (SEROQUEL) 25 MG tablet Take 25 mg by mouth daily.     • amoxicillin (AMOXIL) 500 MG tablet 4 tablets 1 hour before Dental procedure. 12 tablet 6   • intrathecal implanted pain pump/refill 1 Syringe by Intrathecal route once for 1 dose. Do not start before June 13, 2019. This prescription is only to be dispensed by [facility]. 1 Syringe 0   • Aspirin-Acetaminophen-Caffeine (EXCEDRIN MIGRAINE PO) Take 2 tablets by mouth as needed (migraine).     • acetaminophen (TYLENOL) 325 MG tablet Take 2 tablets by mouth every 4 hours as needed for Pain or Fever. 30 tablet 0   • sumatriptan (IMITREX) 100 MG tablet TAKE 1 TABLET AT ONSET OF MIGRAINE, MAY REPEAT AFTER 2 HOURS IF NEEDED 27 tablet 1   • lithium 150 MG capsule Take 150-300 mg by mouth 2 times daily (with meals). Take 1 capsule (dose: =150 mg) by mouth in the AM and 2 capsules (dose: =300 mg) by mouth in the PM     • polyethylene glycol (MIRALAX) powder Take 17 g by mouth daily as needed. Dissolved in 8 oz of liquid  Indications: Constipation     • hydromorphone 2.127 mg/day by Intrathecal route continuous. Intrathecal pain pump Medtronic 40 ml:  Daily dose: Hydromorphone 2.127 mg and Bupivicaine 2.669 mg and Clonidine 26.69 mcg/day  (Simple Continuous programming).  Alarm date is 12/26/2019. Managed by Dr Guru England through [facility].       No current facility-administered medications for this visit.      Medications reviewed / reconciled: Yes    BASELINE FUNCTIONAL STATUS:  Independent    CURRENT  FUNCTIONAL STATUS:  Independent    DIET:  Consistency: General   Type: regular  Appetite: Good    REVIEW OF SYSTEMS:  Review of Systems   Constitutional: Negative for chills and fever.   HENT: Negative for congestion and sore throat.    Respiratory: Negative for cough and shortness of breath.    Cardiovascular: Negative for chest pain and leg swelling.   Gastrointestinal: Negative for constipation, diarrhea and nausea.   Endocrine: Positive for cold intolerance.   Genitourinary: Positive for urgency. Negative for difficulty urinating, dysuria and hematuria.   Musculoskeletal: Positive for arthralgias and back pain.   Neurological: Negative for dizziness, tremors, seizures and weakness.   Psychiatric/Behavioral: Negative for agitation and behavioral problems.     VITALS:  Vitals:    10/21/19 1102   BP: 110/50   Pulse: 70   Resp: 16   Temp: 98 °F (36.7 °C)   SpO2: 94%   Weight: 65.2 kg     PHYSICAL ASSESSMENT:  Physical Exam  HENT:      Head: Normocephalic and atraumatic.      Mouth/Throat:      Mouth: Mucous membranes are dry.   Cardiovascular:      Rate and Rhythm: Normal rate.      Heart sounds: Normal heart sounds.   Pulmonary:      Effort: Pulmonary effort is normal.      Breath sounds: Normal breath sounds.   Abdominal:      General: Bowel sounds are normal.   Musculoskeletal:         General: No swelling.   Skin:     General: Skin is warm and dry.      Coloration: Skin is pale.   Neurological:      Mental Status: She is alert and oriented to person, place, and time.      Motor: Weakness present.      Coordination: Coordination abnormal.       LABS:  10/16/19  Na+ 137  K+ 4.6  BUN 20  Creatinine 1.12  Ca+ 11.3  Vitamin D 25-Hydroxy 21.6  10/15/19  INR 3.2  - coumadin reduced from 5 mg daily to 4 mg daily.  PTH 78.5 - Magnesium discontinued  10/11/19  WBC 7.9  H/H 13.7/46  Platelet Count 282    ASSESSMENT AND PLAN    Bipolar affective disorder, current episode severity unspecified (CMS/HCC)  (primary encounter  diagnosis)  Plan: klonopin 1mg bid, ritalin 10 mg daily, lithium 150 mg am, 300 mg pm, and seroquel 25 mg daily    Multiple falls  Plan: PT/OT to continue at home    Seizure disorder (CMS/HCC)  Plan: keppra increased to 750 mg bid.      Other chronic pain  Plan: intrathecal pain pump (hydromorphone).  Patient has a pain clinic appointment this month.  She states they are thinking about taking the pump out.  Also takes gabapentin 200 mg tid.  Also baclofen 5 mg tid    Pacemaker, Medtronic, dual chamber     Aortic valve replacement  Plan: warfarin 4 mg po daily.  Monitor PT/INR.    Vitamin D deficiency  Plan: started on oral vitamin D replacement of 5000 units daily.    Migraine without status migrainosus, not intractable, unspecified migraine type  Plan: prn excedrin migraine and imitrex prn    FOLLOW UP APPOINTMENTS:  Future Appointments   Date Time Provider Department Center   10/23/2019 11:45 AM Guru England MD [id number]   11/29/2019  1:15 PM Claudio Berry MD [facility]   12/19/2019 10:45 AM [facility] [address] [facility] DEVICE CHECK [facility]   1/15/2020  1:00 PM Anai Rdz MD [facility]   3/13/2020  9:20 AM [facility] REMOTE DEVICE CHECK [facility]       DISCHARGE PLANNING: home with medication management help.      Prognosis: good    Discussed with: RN / Nursing, Patient, MD, SW, OT, PT and Primary Care Physician    The patient was seen for a discharge visit and 40 minutes were spent on the discharge process.    Nadine RAMACHANDRAN

## 2024-03-20 ENCOUNTER — OFFICE VISIT (OUTPATIENT)
Dept: PHYSICAL MEDICINE AND REHAB | Facility: CLINIC | Age: 79
End: 2024-03-20
Payer: MEDICARE

## 2024-03-20 VITALS — HEART RATE: 81 BPM | HEIGHT: 67 IN | OXYGEN SATURATION: 97 % | BODY MASS INDEX: 24.33 KG/M2 | WEIGHT: 155 LBS

## 2024-03-20 DIAGNOSIS — M47.812 ARTHROPATHY OF CERVICAL FACET JOINT: Primary | ICD-10-CM

## 2024-03-20 DIAGNOSIS — I10 ESSENTIAL HYPERTENSION: ICD-10-CM

## 2024-03-20 DIAGNOSIS — Q76.1 CERVICAL FUSION SYNDROME: ICD-10-CM

## 2024-03-20 DIAGNOSIS — M48.02 CERVICAL STENOSIS OF SPINE: ICD-10-CM

## 2024-03-20 DIAGNOSIS — M54.2 NECK PAIN ON RIGHT SIDE: ICD-10-CM

## 2024-03-20 DIAGNOSIS — M54.12 CERVICAL RADICULOPATHY: ICD-10-CM

## 2024-03-20 DIAGNOSIS — M50.90 CERVICAL DISC DISEASE: ICD-10-CM

## 2024-03-20 DIAGNOSIS — G44.86 CERVICOGENIC HEADACHE: ICD-10-CM

## 2024-03-20 PROCEDURE — 99214 OFFICE O/P EST MOD 30 MIN: CPT | Performed by: PHYSICAL MEDICINE & REHABILITATION

## 2024-03-20 NOTE — PROGRESS NOTES
Cervical [facility]    Chief Complaint:    Chief Complaint   Patient presents with    Follow - Up     LOV 1/16/24 pt is here for a follow up . No N/T.  Reports aching pain throughout the day. Takes Excedrin prn to ease pain. Particpated in physcial therpay.  Pain 6.5/10     Nursing note reviewed and verified.    Patient was last seen on 1/16/2024.  On 1/26/2024, I did right C2-3 and C3-4 z-joint injections which took about 6 weeks for to notice any relief.  She has been doing the PT with [facility] and this has helped to the point where she now has moments where she has no pain.    Description of the Pain  The pain is located in the right base of the skull.    The pain radiates to right occiput area of the head  and right shoulder.  She will have pain on the top of her head only in the morning.  The pain at its best is 5/10. The pain at its worst is 8/10. The pain is currently  7/10.  The pain is described as a(n) dull sensation.    The patient reports no numbness.  The patient reports no tingling.  There is not weakness in bilateral hands and arms.  The pain is worse looking to the right, looking to the left, looking up, looking down, and in the evening.   The pain is better with nothing.         Past Medical History   Past Medical History:   Diagnosis Date    Disorder of thyroid     Essential hypertension        Past Surgical History   Past Surgical History:   Procedure Laterality Date    BACK SURGERY      HYSTERECTOMY      KNEE REPLACEMENT SURGERY N/A     X2    OTHER SURGICAL HISTORY      TRIGGER FINGER RELEASE N/A     X4       Family History   Family History   Problem Relation Age of Onset    Cancer Mother     Heart Disease Father     Heart Attack Sister        Social History   Social History     Socioeconomic History    Marital status:      Spouse name: Not on file    Number of children: Not on file    Years of education: Not on file    Highest education level: Not on file   Occupational History    Not on file    Tobacco Use    Smoking status: Never    Smokeless tobacco: Never   Vaping Use    Vaping Use: Never used   Substance and Sexual Activity    Alcohol use: No    Drug use: No    Sexual activity: Not on file   Other Topics Concern    Caffeine Concern No    Exercise Yes    Seat Belt Not Asked    Special Diet Not Asked    Stress Concern Not Asked    Weight Concern Not Asked   Social History Narrative    Not on file     Social Determinants of Health     Financial Resource Strain: Not on file   Food Insecurity: Not on file   Transportation Needs: Not on file   Physical Activity: Not on file   Stress: Not on file   Social Connections: Not on file   Housing Stability: Not on file       PE:  The patient does appear in her stated age in no distress.  The patient is well groomed.    Psychiatric:  The patient is alert and oriented x 3.  The patient has a normal affect and mood.      Respiratory:  No acute respiratory distress. Patient does not have a cough.    HEENT:  Extraocular muscles are intact. There is no kern icterus. Pupils are equal, round, and reactive to light. No redness or discharge bilaterally.    Skin:  There are no rashes or lesions.    Lymph Nodes:  The patient has no palpable submandibular, supraclavicular, and cervical lymph nodes..    Vitals:  Vitals:    03/20/24 1640   Pulse: 81       Cervical Spine:    Posture: mild chin forward superiorly rotated protracted shoulder posture.   Shoulders: Level   Head: In neutral   Spinous Processes Palpations: Non-tender for all Spinous Processes   Z-Joints Palpations: Tender at  right OA, right C1-2, bilateral C2-3, right C3-4, right C4-5, right C5-6, and right C6-7   Muscular Palpations: Tender at  right upper trapezius  bilateral pectoralis minor  bilateral scalene     Vascular upper extremity:   Right radial pulses: 2+   Left radial pulses: 2+      Neurological Upper Extremity:    Light Touch: Intact in Bilateral upper extremities.   Pin Prick: Not tested.   UE Muscle  Strength: All Upper Extremity strength measurements 5/5 except:  Biceps Right: 4-/5  Shoulder external rotators Right: 3+/5  Shoulder internal rotator Right: 4/5  Serratus anterior Right: 4-/5  Serratus anterior Left: 4+/5   Reflexes: 2+ In the bilateral upper extremities.   Lopez's sign Right: Negative   Lopez's sigh Left: Negative     Assessment  1. Arthropathy of cervical facet joint: right mod-severe C2-3, C3-4 right mod    2. C3-4 mild diffuse, C5-6 mod-large diffuse, C6-7 mod diffuse bulging discs    3. C5-6 mod-severe, C6-7 mild-mod central stenosis    4. 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 Cervicogenic headache    6. Neck pain on right side    7. Essential hypertension    8. right C6 radiculopathy      Plan  I will do a right C6 TFESI. The risks of spinal cord injury and stroke were discussed with the patient.    If the injection helps, then she will get back into the PT.    The patient will follow up in 2-3 months, but the patient will call me 2 weeks after having the injection to let me know how the injection worked.    The patient understands and agrees with the stated plan.    Real Razo MD  3/20/2024

## 2024-03-20 NOTE — PATIENT INSTRUCTIONS
Plan  I will do a right C6 TFESI. The risks of spinal cord injury and stroke were discussed with the patient.    If the injection helps, then she will get back into the PT.    The patient will follow up in 2-3 months, but the patient will call me 2 weeks after having the injection to let me know how the injection worked.

## 2024-03-25 ENCOUNTER — APPOINTMENT (OUTPATIENT)
Dept: PHYSICAL THERAPY | Age: 79
End: 2024-03-25
Attending: PHYSICAL MEDICINE & REHABILITATION
Payer: MEDICARE

## 2024-03-26 ENCOUNTER — TELEPHONE (OUTPATIENT)
Dept: NEUROLOGY | Facility: CLINIC | Age: 79
End: 2024-03-26

## 2024-03-26 ENCOUNTER — TELEPHONE (OUTPATIENT)
Dept: PHYSICAL MEDICINE AND REHAB | Facility: CLINIC | Age: 79
End: 2024-03-26

## 2024-03-26 DIAGNOSIS — M54.12 CERVICAL RADICULOPATHY: Primary | ICD-10-CM

## 2024-03-26 NOTE — TELEPHONE ENCOUNTER
With Dr. Morales's approval - Dr. Razo will perform TFESI on 03/28/2024 at 11:00AM. See CHU FERNÁNDEZ 03/26/2024 for scheduling details.

## 2024-03-26 NOTE — TELEPHONE ENCOUNTER
Patient has been scheduled for Right C6 Transforaminal epidural steroid injection on 03/28/2024 at the [facility] with Dr. IZAGUIRRE.   -Anesthesia type: LOCAL.  -If scheduling [facility] covid testing required for all procedures whether patient is vaccinated or not.  -Patient informed not to eat or drink anything after midnight the night prior to the procedure, if being sedated. (For afternoon injections: Patient to fast 6-8 hours prior to procedure with IVCS/MAC. )  -Patient was advised that if he/she does receive the covid vaccine it needs to be at least 2 weeks before or after the injection.  -Medications and allergies reviewed.  -Patient reminded to hold NSAIDs (Ibuprofen, ASA 81, Aleve, Naproxen, Mobic, Diclofenac, Etodolac, Celebrex etc.) for 3 days prior to LUMBAR FACET JOINT INJECTIONS OR MEDIAL BRANCH BLOCK INJECTIONS  if BMI is greater than 35. For Cervical injections only hold multivitamins, Vitamin E, Fish Oil, Phentermine (Lomaira) for 7 days prior to injection and NSAIDS.   mg to be held for 7 days prior to injections.  -If patient is receiving MAC/IVCS Phentermine (Lomaira), Adlyxin (Lixisenatide), Bydureon BCise (Exenatide-release), Byetta (Exenatide), Mounjaro (Tirezepatide), Ozempic (Semaglutide), Rybelsus (oral demaglutide), Saxenda (Liraglutide), Trulicity (Dulaglutide), Victoriza (Liraglutide), Wegovy (Semaglutide), Berberine (oral natures ozempic) will need to be held for 7 days prior to injection.  -If patient's BMI is greater than 50. Patient is unable to get IVCS/MAC at [facility]. (Options: Patient can go to [facility] under MAC or ENDO under IVCS-reminder physician's slots at ENDO are limited. OR Patient can have the procedure done under local anesthesia at [facility] with Valium ( per physician's preference.)    -If on blood thinner clearance has been received to hold this medication by provider.   -Patient informed he/she will need a  to and from procedure. EFFECTIVE 12/1/23- Per [facility]: \" Our PAT team will  notify the patient that their ride MUST remain onsite for the entirety of their visit if the ride is unable to do so the procedure will be canceled. \"    -[facility] is located in the [facility] 1st floor. Patient may park in the yellow or purple parking.    Follow up appointment has been scheduled for patient on: 07/02/2024    Patient verbalized understanding and agrees with plan.  -----> Scheduled in Epic: N/A  -----> Scheduled in [employer]/Surgical Case Request: Yes

## 2024-03-26 NOTE — TELEPHONE ENCOUNTER
Patient calling to schedule Cervical Injection. Informed patient that an injection order was not placed during LOV and therefore, the need to schedule the patient did not drop in our queue.     Per Dr. Razo's LOV note: \"I will do a right C6 TFESI. The risks of spinal cord injury and stroke were discussed with the patient.     If the injection helps, then she will get back into the PT.     The patient will follow up in 2-3 months, but the patient will call me 2 weeks after having the injection to let me know how the injection worked.\"      RN placed order for Cervical TFESI as recommended in LOV note.      Patient informed this RN that Dr. Razo had told the patient that if there was a cancellation for today (03/26/2024) that patient would be added on and if there was none then patient would be scheduled for: 04/09/2024. This RN informed patient that as of right now, 04/09/2024 is currently booked. However, informed patient that I will route her new order to referrals for authorization (authorization was completed as writer was completing this note) as well as discuss patient's situation with Dr. Razo to see if there is any place we can place her in before 04/12/2024. Pt verbalized understanding.

## 2024-03-26 NOTE — TELEPHONE ENCOUNTER
Per  on 3/20/24, call his office and schedule injection on 3/26/24 if any cancellation or 4/9/2024. Please giver a call to coordinate appointment.

## 2024-03-26 NOTE — TELEPHONE ENCOUNTER
Per CMS Guidelines -no authorization is required for Right C6 TFESI CPT 51307     Status: Authorization is not required based on medical necessity however may be subject to review once claim is submitted-Covered Benefit

## 2024-04-18 ENCOUNTER — TELEPHONE (OUTPATIENT)
Dept: PHYSICAL MEDICINE AND REHAB | Facility: CLINIC | Age: 79
End: 2024-04-18

## 2024-04-18 DIAGNOSIS — M50.90 CERVICAL DISC DISEASE: ICD-10-CM

## 2024-04-18 DIAGNOSIS — M54.2 NECK PAIN ON RIGHT SIDE: ICD-10-CM

## 2024-04-18 DIAGNOSIS — M47.812 ARTHROPATHY OF CERVICAL FACET JOINT: Primary | ICD-10-CM

## 2024-04-18 DIAGNOSIS — M54.12 CERVICAL RADICULOPATHY: ICD-10-CM

## 2024-04-18 DIAGNOSIS — M48.02 CERVICAL STENOSIS OF SPINE: ICD-10-CM

## 2024-04-18 DIAGNOSIS — Q76.1 CERVICAL FUSION SYNDROME: ICD-10-CM

## 2024-04-18 NOTE — TELEPHONE ENCOUNTER
Condition update after Procedure.    - Patient had Right C6 Transforaminal epidural steroid injection  (specific procedure) on 3/28/2024 (date) with .  - 70% relief in the neck, however no relief in the head.    Patient reports right posterior headache the same as last appointment without any change.    Patient requested an earlier appointment if possible.     Rescheduled patient on 5/16/2024.     Patient also wanted to ask Dr Razo if he has any additional recommendations for her headache.     - LOV: 3/20/2024 Real Razo MD

## 2024-04-20 NOTE — TELEPHONE ENCOUNTER
I would like for her to try 2 more weeks of the PT to see if she is now able to make better progress.  I will need to see her after she has done the PT.  New order placed.

## 2024-05-30 ENCOUNTER — OFFICE VISIT (OUTPATIENT)
Dept: PHYSICAL THERAPY | Age: 79
End: 2024-05-30
Attending: PHYSICAL MEDICINE & REHABILITATION

## 2024-05-30 DIAGNOSIS — M54.12 CERVICAL RADICULOPATHY: ICD-10-CM

## 2024-05-30 DIAGNOSIS — M47.812 ARTHROPATHY OF CERVICAL FACET JOINT: Primary | ICD-10-CM

## 2024-05-30 DIAGNOSIS — M50.90 CERVICAL DISC DISEASE: ICD-10-CM

## 2024-05-30 DIAGNOSIS — M48.02 CERVICAL STENOSIS OF SPINE: ICD-10-CM

## 2024-05-30 DIAGNOSIS — M54.2 NECK PAIN ON RIGHT SIDE: ICD-10-CM

## 2024-05-30 DIAGNOSIS — Q76.1 CERVICAL FUSION SYNDROME: ICD-10-CM

## 2024-05-30 PROCEDURE — 97140 MANUAL THERAPY 1/> REGIONS: CPT

## 2024-05-30 PROCEDURE — 97110 THERAPEUTIC EXERCISES: CPT

## 2024-05-30 NOTE — PROGRESS NOTES
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)  Neck pain on right side (M54.2)  Cervicogenic headache (G44.86)  Cervical fusion syndrome (Q76.1)  Cervical disc disease (M50.90)  Cervical stenosis of spine (M48.02)         Referring Provider: Dung  Date of Evaluation:   2024    Precautions:  Cervical fusion  C4-5 ACDF      Next MD visit:   none scheduled  Date of Surgery: n/a    Insurance Primary/Secondary: MEDICARE / [employer] INS       Total Timed Treatment: 40 min Total Treatment time: 40 min  Charges: man 2 TE 1    Treatment     Subjective:  Pt returns to PT for further tx. Pt had cervical spine injection at R C5/C6 level. Pt reports that she had noticeable relief of R neck pain for ~ 7-10 days. Symptoms returned to baseline after that. Pain can be at rest or with activity. Pain runs along the R posterior lateral neck from occiput to C/T region. Also remains with headaches at times. Symptoms do not limit mobility. Has orders to continue PT to progress spine mobility and strength, stabilization.     Pt describes pain level current 3/10, at best 3/10, at worst 6/10      Objective:  Flexion: full motion  Extension: 55*  Sidebendin* each  Rotation: R 45; L 55      Palpation: moderate size painful tissue nodule at R upper trap, reproduces some her pain with moderate pressure to nodule.  Few minimal to moderate size issue nodules R scalenes/SCM, pain with moderate pressure.     Manual PT: 20 min total    Prone:  IASTM HG tool 8 to nodule at R upper trap x 12 min    Supine:  Tissue mobilization to all 3 R scalenes, focus on muscle bellies and proximal attachments 15 min  R unilateral P/A glides grade 3,4 to C5 and C6 segments    TE: 25 min  Supine:  L and R scalene stretches with 30 sec holds, all 3, 1 and 2nd rib fixated 5 minutes  L and R rotation PROM x 5 each    It: L and R rotation AROM to end range hold 5 sec x 3 each  resume HEP  rotation 60* each, no pain       HEP: as  noted above    Assessment:  Pt returns to PT with order to continue PT for further progression of cervical; spine mobility and strength and tissue mobility. Pt had facet injection which initially offered good relief of chronic R neck pain. Today findings at R c spine included stiff and painful C5 and C 6 segments, painful tissue modules, possible upper trap trigger point. All were addressed with tx. Cervical rotation ROM improved as noted above. Had pain with rotation to R prior to tx, none after. Pt will return to regular cervical spine rotation AROM to maintain progress made today, f/u in 1 week. Will tx 1-2 week for 4-6 sessions. Will continue to focus on unmet goals.       Goals: (to be met in 16 visits)   -Improve cervical spine strength to WNL's as noted with biofeedback cuff value of 30 mmhg or > with performing cranial flexion, decrease head/neck pain complaints by 50% during stationary activities such as reading.  Not being met for pain  -Improve cervical rotation AROM to 60* or > to ease head/neck pain complaints by 50% while performing her daily activities including ADL's and house chores, operating car. Not being met for pain  -Improve cervical spine extension ROM to 60* or >,ease head/neck pain complaints by 50%, so pt can look up into overhead cabinets or shelves with decreased pain. Not being met for pain  -independent with progressive HEP      Neck Disability Index Score  Score: 32 % (1/29/2024 11:11 AM)    Plan  Pt will return to regular cervical spine rotation AROM. Will tx 1-2 week for 4-6 sessions. Will continue to focus on unmet goals

## 2024-06-05 ENCOUNTER — OFFICE VISIT (OUTPATIENT)
Dept: PHYSICAL THERAPY | Age: 79
End: 2024-06-05
Attending: PHYSICAL MEDICINE & REHABILITATION
Payer: MEDICARE

## 2024-06-05 PROCEDURE — 97140 MANUAL THERAPY 1/> REGIONS: CPT

## 2024-06-05 PROCEDURE — 97110 THERAPEUTIC EXERCISES: CPT

## 2024-06-05 PROCEDURE — 97112 NEUROMUSCULAR REEDUCATION: CPT

## 2024-06-05 NOTE — PROGRESS NOTES
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)  Neck pain on right side (M54.2)  Cervicogenic headache (G44.86)  Cervical fusion syndrome (Q76.1)  Cervical disc disease (M50.90)  Cervical stenosis of spine (M48.02)         Referring Provider: Dung  Date of Evaluation:   2024    Precautions:  Cervical fusion  C4-5 ACDF      Next MD visit:   none scheduled  Date of Surgery: n/a    Insurance Primary/Secondary: MEDICARE / [employer] INS       Total Timed Treatment: 40 min Total Treatment time: 40 min  Charges: man 1 TE  NMR 1    Treatment 12 of 16    Subjective: R lateral/posterior neck pain ~ same.     Pt describes pain level current 0/10 at rest    Objective:    TE:  Flexion: full motion  Extension: 55*  Sidebendin* each  Rotation: R 50; L 55      Palpation: moderate size painful tissue nodule at R upper trap, reproduces some her pain with moderate pressure to nodule.      Manual PT: 15 min total    Sit with head in neutral, flexed,resting on pillow, raised table, perform IASTM HG tool 8 to nodule at R upper trap x 12 min    NMR: 15 min  Supine:  Cranial flexion with biofeedback cuff, base 20 mm hg, progress to 26-28 mmhg, cues no substitution, 10 sec holds x 8 reps HEP  Cervical flexion AAROM with cranial flexion maintained x 5 reps 2 sets  Cervical flexion AROM with cranial flexion maintained x 5 reps add HEP    Prone:  Upper trunk extension AROM with chin nod maintained, 10 reps add HEP      TE:  Sit:   L and R rotation AROM to end range hold 5 sec x 3 each  resume HEP  rotation 60* each, no pain  cont HEP      HEP: as noted above    Assessment: NO change with chronic neck pain following last tx. Believe majority of complaints are myofacial pain that is exacerbated by decreased posture awareness and cervical spine strength. HEP was established for this. HO issued for additional HEP.     Goals: (to be met in 16 visits)   -Improve cervical spine strength to WNL's as noted with  biofeedback cuff value of 30 mmhg or > with performing cranial flexion, decrease head/neck pain complaints by 50% during stationary activities such as reading.  Not being met for pain  -Improve cervical rotation AROM to 60* or > to ease head/neck pain complaints by 50% while performing her daily activities including ADL's and house chores, operating car. Not being met for pain  -Improve cervical spine extension ROM to 60* or >,ease head/neck pain complaints by 50%, so pt can look up into overhead cabinets or shelves with decreased pain. Not being met for pain  -independent with progressive HEP      Neck Disability Index Score  Score: 32 % (1/29/2024 11:11 AM)    Plan  check HEP, to be done twice a day. Check symptoms.

## 2024-06-10 ENCOUNTER — OFFICE VISIT (OUTPATIENT)
Dept: PHYSICAL THERAPY | Age: 79
End: 2024-06-10
Attending: PHYSICAL MEDICINE & REHABILITATION
Payer: MEDICARE

## 2024-06-10 PROCEDURE — 97110 THERAPEUTIC EXERCISES: CPT

## 2024-06-10 PROCEDURE — 97112 NEUROMUSCULAR REEDUCATION: CPT

## 2024-06-10 PROCEDURE — 97140 MANUAL THERAPY 1/> REGIONS: CPT

## 2024-06-10 NOTE — PROGRESS NOTES
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)  Neck pain on right side (M54.2)  Cervicogenic headache (G44.86)  Cervical fusion syndrome (Q76.1)  Cervical disc disease (M50.90)  Cervical stenosis of spine (M48.02)         Referring Provider: Dung  Date of Evaluation:   2024    Precautions:  Cervical fusion  C4-5 ACDF      Next MD visit:   none scheduled  Date of Surgery: n/a    Insurance Primary/Secondary: MEDICARE / [employer] INS       Total Timed Treatment: 40 min Total Treatment time: 40 min  Charges: man 1 TE 1  NMR 1    Treatment 13 of 16    Subjective: states she has has a steady increase in headache pain, neck pain over the past few days. Also has \"whole body ache/stiffness\". No fever or feeling of being ill. Has episodes in the past of these complaints. Head pain is at R frontal region, neck pain R posterior and latera neck. Pain 8/10.     Pt describes pain level current 0/10 at rest    Objective:    TE:  Flexion: full motion  Extension: 55*  Sidebendin* each  Rotation: R 45; L 45  -no change in pain      Palpation: moderate size painful tissue nodule at R upper trap, reproduces some her pain with moderate pressure to nodule.      Manual PT: 15 min total    Sit with head in neutral, flexed,resting on pillow, raised table, perform IASTM HG tool 8 to nodule at R upper trap x 12 min    TE:   Sit:  L and R upper trap stretch x 30 sec x 3 each  L and R levator stretch x 30 sec x 3 each  L and R rotation AROM to end range hold 5 sec x 3 each   rotation 50* each HEP    NMR: 15 min  Supine:  C2 P/A grade 2 x 30 sec x 2   Cranial flexion with biofeedback cuff, base 20 mm hg, progress to 34-36 mmhg, cues no substitution, 10 sec holds x 5 reps HEP  Cervical flexion AROM with cranial flexion maintained x 5 reps add HEP    Prone:  Upper trunk extension AROM with chin nod maintained, 10 reps   Head off table, resting in cervical flexion, perform cervical extension, cervical nod  maintained, perform to neutral x 7 reps progress HEP      HEP: as noted above    Assessment: Pt has had increase in neck and head complaints, no known cause. Chronic symptoms. Pt's 8/10 pain unchanged during or after tx. HEP was reviewed, improved neck strength of flexors noted with biofeedback readings now WNL. Neck extensors remain weak which was addressed with progression of HEP as noted above, goal 3. No pain changes. HO issued for additional HEP.     No change with chronic neck pain following last tx. Believe majority of complaints are myofacial pain that is exacerbated by decreased posture awareness and cervical spine strength. HEP was established for this. HO issued for additional HEP.     Goals: (to be met in 16 visits)   -Improve cervical spine strength to WNL's as noted with biofeedback cuff value of 30 mmhg or > with performing cranial flexion, decrease head/neck pain complaints by 50% during stationary activities such as reading.  Not being met for pain  -Improve cervical rotation AROM to 60* or > to ease head/neck pain complaints by 50% while performing her daily activities including ADL's and house chores, operating car. Not being met for pain  -Improve cervical spine extension ROM to 60* or >,ease head/neck pain complaints by 50%, so pt can look up into overhead cabinets or shelves with decreased pain. Not being met for pain  -independent with progressive HEP      Neck Disability Index Score  Score: 32 % (1/29/2024 11:11 AM)    Plan  check HEP, to be done twice a day. Check symptoms.

## 2024-06-12 ENCOUNTER — OFFICE VISIT (OUTPATIENT)
Dept: PHYSICAL THERAPY | Age: 79
End: 2024-06-12
Attending: PHYSICAL MEDICINE & REHABILITATION
Payer: MEDICARE

## 2024-06-12 PROCEDURE — 97140 MANUAL THERAPY 1/> REGIONS: CPT

## 2024-06-12 PROCEDURE — 97112 NEUROMUSCULAR REEDUCATION: CPT

## 2024-06-12 PROCEDURE — 97110 THERAPEUTIC EXERCISES: CPT

## 2024-06-12 NOTE — PROGRESS NOTES
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)  Neck pain on right side (M54.2)  Cervicogenic headache (G44.86)  Cervical fusion syndrome (Q76.1)  Cervical disc disease (M50.90)  Cervical stenosis of spine (M48.02)         Referring Provider: Dung  Date of Evaluation:   2024    Precautions:  Cervical fusion  C4-5 ACDF      Next MD visit:   none scheduled  Date of Surgery: n/a    Insurance Primary/Secondary: MEDICARE / [employer] INS       Total Timed Treatment: 40 min Total Treatment time: 40 min  Charges: man 1 TE 1  NMR 1    Treatment     Subjective:  Reports her recent exacerbation of R posterior occipital region headache has reduced from 8/10 pain to 7/10 pain. R neck pain with turning head to R about the  same, no change, 5/10 pain at end range. Pain is described as pulling pain along the soft tissue of R paraspinal region.     Pt describes pain level current 7/10 headache    Objective:    TE:  Flexion: full motion  Extension: 55*  Sidebendin* each  Rotation: R 45; L 50  -no change in pain      Palpation: moderate reduced to minimal size painful tissue nodule at R upper trap, no pain with moderate pressure to nodule.      Manual PT: 15 min total  Sit:  Grade 2 C2 And C3 P/A's  R C/T junction segments up and forward grade 4 mobes with rotation AROM to end range  5 min   Cervical distraction with R rotation to end range x 3 reps    TE:   Sit:  L and R upper trap stretch x 30 sec x 3 each  L and R levator stretch x 30 sec x 3 each  L and R rotation AROM to end range hold 5 sec x 3 each   rotation 50* each HEP    NMR: 15 min  Supine:  Cranial flexion with biofeedback cuff, base 20 mm hg, progress to 34-36 mmhg, cues no substitution, 10 sec holds x 5 reps HEP  Cervical flexion AROM with cranial flexion maintained x 5 reps add HEP    Prone:  Head off table, resting in cervical flexion, perform cervical extension, cervical nod maintained, perform to neutral x 7 reps   HEP      HEP: as noted above    Assessment: no change in headaches or R neck pain today with tx. Pt with recent unexplained exacerbation of R headaches and neck pain. Pain does not limit function. Full R rotation was restored with tx. Tx included upper and lower cervical/C/T junction mobes, manual cervical distraction/traction,cervical soft tissue stretches and strength exercises all with no increase or decrease in complaints. Large upper trap painful tissue nodule that had reproduced some of her symptoms has been nearly resolved with tx, no longer has pain with palpation. Pt has agreed to have a trial of cervical traction to assess any changes next session. If remains unchanged with hold tx, have pt return to Dr. Razo.         Goals: (to be met in 16 visits)   -Improve cervical spine strength to WNL's as noted with biofeedback cuff value of 30 mmhg or > with performing cranial flexion, decrease head/neck pain complaints by 50% during stationary activities such as reading.  Not being met for pain  -Improve cervical rotation AROM to 60* or > to ease head/neck pain complaints by 50% while performing her daily activities including ADL's and house chores, operating car. Not being met for pain  -Improve cervical spine extension ROM to 60* or >,ease head/neck pain complaints by 50%, so pt can look up into overhead cabinets or shelves with decreased pain. Not being met for pain  -independent with progressive HEP      Neck Disability Index Score  Score: 32 % (1/29/2024 11:11 AM)    Plan  trial of cervical traction

## 2024-06-17 ENCOUNTER — OFFICE VISIT (OUTPATIENT)
Dept: PHYSICAL THERAPY | Age: 79
End: 2024-06-17
Attending: PHYSICAL MEDICINE & REHABILITATION
Payer: MEDICARE

## 2024-06-17 PROCEDURE — 97012 MECHANICAL TRACTION THERAPY: CPT

## 2024-06-17 PROCEDURE — 97140 MANUAL THERAPY 1/> REGIONS: CPT

## 2024-06-17 PROCEDURE — 97110 THERAPEUTIC EXERCISES: CPT

## 2024-06-17 NOTE — PROGRESS NOTES
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)  Neck pain on right side (M54.2)  Cervicogenic headache (G44.86)  Cervical fusion syndrome (Q76.1)  Cervical disc disease (M50.90)  Cervical stenosis of spine (M48.02)         Referring Provider: Dung  Date of Evaluation:   2024    Precautions:  Cervical fusion  C4-5 ACDF      Next MD visit:   none scheduled  Date of Surgery: n/a    Insurance Primary/Secondary: MEDICARE / [employer] INS       Total Timed Treatment: 55 min Total Treatment time: 55 min  Charges: man 1 TE 2 traction 1     Treatment     Subjective:  Reports her recent exacerbation of R posterior occipital region headache 1 week ago has reduced back to baseline. Currently no headache, when presents typical 5/10 pain at the R posterior or anterior head region. Has had low grade R neck still unchanged. Increases with R rotation. Has newer c/o L shoulder stiffness and pain with raising arm overhead, began last week, may be exacerbated with sleeping on L side.     Pt describes pain level current 0/10 headache. R neck 3-4/10. L shoulder 4/10    Objective:    TE:  Flexion: full motion  Extension: 55*  Sidebendin* each  Rotation: R 50; L 50  -no change in pain    L shoulder AROM flexion 150, abduction 140, ERP with both motions at posterior shoulder.       Palpation: moderate reduced to minimal size painful tissue nodule at R upper trap, no pain with moderate pressure to nodule.      Manual PT: 10 min total  Sit:  L GH joint posterior and inferior grade 3.4 mobes     TE: 25 min  Supine:  L shoulder PROM IR, ER flex and ABD  B in 90/90 position, palms face in with thumb contact table, perform OH slides to 140 x 10 reps HEP    Stand:    L and R rotation AROM to end range hold 5 sec x 3 each   rotation 50* each HEP  B shoulder ER with yellow band, scap retract x 5 sec hold x 10 reps add HEP  Alternating L and R shoulder flexion to end range x 10 each HEP    Mechanical traction  in 20* flexion with 20 lb max x 60 sec and 8 lb minimal x 20 seconds  20 min total time    HEP: as noted above    Assessment: Pt better with headaches versus last week in which she increased headache for  a week. Pt now at her baseline of no longer constant headache, when present is 5/10 at R anterior  or posterior head. R neck pain at posterior neck/paraspinal region is unchanged despite large upper trapezius painful nodules that have greatly decreased. New c/o L shoulder impingement pain which was addressed. Pt with hypomobile joint and decreased latissimus length. Addressed with tx and HEP, HO and band issued. Had a trial of cervical mechanical traction with no change in neck pain.     Have not responded as expected, no consistent improvement, no known triggering factors. Treatment has included manual mobilizations to cervical and thoracic spine, posture exercises, cervical spine ROM and strength exercises, tissue mobility tx and exercise, mechanical traction.     Goals: (to be met in 16 visits)   -Improve cervical spine strength to WNL's as noted with biofeedback cuff value of 30 mmhg or > with performing cranial flexion, decrease head/neck pain complaints by 50% during stationary activities such as reading.  Not being met for pain  -Improve cervical rotation AROM to 60* or > to ease head/neck pain complaints by 50% while performing her daily activities including ADL's and house chores, operating car. Not being met for pain  -Improve cervical spine extension ROM to 60* or >,ease head/neck pain complaints by 50%, so pt can look up into overhead cabinets or shelves with decreased pain. Not being met for pain  -independent with progressive HEP      Neck Disability Index Score  Score: 32 % (1/29/2024 11:11 AM)    Plan  pt to f/u in 1 week, focus on new HEP for L shoulder, reprot any changes following mechanical traction. If no specific improvement will discharge from PT due to lack of steady progress.

## 2024-06-24 ENCOUNTER — OFFICE VISIT (OUTPATIENT)
Dept: PHYSICAL THERAPY | Age: 79
End: 2024-06-24
Attending: PHYSICAL MEDICINE & REHABILITATION

## 2024-06-24 PROCEDURE — 97110 THERAPEUTIC EXERCISES: CPT

## 2024-06-24 NOTE — PROGRESS NOTES
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 Typically pain is 5-6/10 but can increase to 8/10. Recent c/o L shoulder pain with raising arm overhead improving.       Objective:    TE:  Flexion: full motion  Extension: 55*  Sidebendin* each  Rotation: R 60; L 60  -no change in pain    Manual PT: 10 min total  Supine:  C2 P/A grade 2 x 30 sec x 2   L GH joint posterior and inferior grade 3.4 mobes     TE: 35 min  Supine:  L shoulder PROM IR, ER flex and ABD  B in 90/90 position, palms face in with thumb contact table, perform OH slides to 140 x 10 HEP    L and R upper trap stretch x 60 sec each  L and R levator stretch x 60 sec each    Supine:  Cranial flexion with biofeedback cuff, base 20 mm hg, progress to 34-36 mmhg, cues no substitution, 10 sec holds x 5 reps HEP  Cervical flexion AROM with cranial flexion maintained x 5 reps j HEP     Stand:  L and R rotation AROM to end range hold 5 sec x 3 each   rotation 50*  HEP  B shoulder ER with yellow band, scap retract x 5 sec hold x 10 reps  HEP  Alternating L and R shoulder flexion to end range x 10 each HEP      HEP: as noted above    Assessment: Pt has completed 15 sessions total of PT for c/o chronic R neck pain and head pain.  She has completed 5 sessions of PT since last progress report following 10th session. There has been no  consistent change with her symptoms following these 5 sessions. Pain ranges from 5 to 8/10. No consistent motions or activities increase or decrease pain. Had a recent trial of mechanical traction with no noticeable change.  Pt has a progressive HEP to continue with for her complaints including recent c/o L mild L shoulder impingement pain.     Treatment has included manual therapy for spinal mobilizations, tissue mobilizations/IASTM for upper trapezius and scalene muscle trigger points. Mechanical and manual cervical spine traction. Strength exercises for cervical spine and upper trunk musculature.     No goals being met due to continued pain.       Goals: (to be met in 16 visits)   -Improve cervical spine strength to WNL's as noted with biofeedback cuff value of 30 mmhg or > with performing cranial flexion, decrease head/neck pain complaints by 50% during stationary activities such as reading.  Not being met for pain  -Improve cervical rotation AROM to 60* or > to ease head/neck pain complaints by 50% while performing her daily activities including ADL's and house chores, operating car. Not being met for pain  -Improve cervical spine extension ROM to 60* or >,ease head/neck pain complaints by 50%, so pt can look up into overhead cabinets or shelves with decreased pain. Not being met for pain  -independent with progressive HEP      Neck Disability Index Score  Score: 32 % (1/29/2024 11:11 AM)    Plan  discharge to [facility]

## 2024-06-28 ENCOUNTER — TELEPHONE (OUTPATIENT)
Dept: PHYSICAL MEDICINE AND REHAB | Facility: CLINIC | Age: 79
End: 2024-06-28

## 2024-06-28 NOTE — TELEPHONE ENCOUNTER
----- Message from Real Razo sent at 6/28/2024  4:24 AM CDT -----  Regarding: FW: PT  Please help this patient to get an appointment with me before her vacation.  ----- Message -----  From: Jairon Brito, PT  Sent: 6/24/2024  10:00 AM CDT  To: Real Razo MD  Subject: PT                                               Hi Dr. Razo,    I saw Alejandra for an additional 5 sessions since my last report to you. She has had a total of 15 sessions.     Unfortunately there has been no change with her c/o R head or neck pain. I have tried everything with her, see my report in EPIC for details of the treatment. I am at a loss with what the origin is.     She is to be leaving in July for a couple of months for vacation and will try to get in to see you before she leaves.     Aldair

## 2024-06-28 NOTE — TELEPHONE ENCOUNTER
Per Dr Razo he wanted to see patient in office sooner. Patient scheduled on 07/03/2024 for a follow up.   No further actions needed at this time. Closing the encounter.

## 2024-07-01 ENCOUNTER — APPOINTMENT (OUTPATIENT)
Dept: PHYSICAL THERAPY | Age: 79
End: 2024-07-01
Attending: PHYSICAL MEDICINE & REHABILITATION
Payer: MEDICARE

## 2024-07-03 ENCOUNTER — OFFICE VISIT (OUTPATIENT)
Dept: PHYSICAL MEDICINE AND REHAB | Facility: CLINIC | Age: 79
End: 2024-07-03
Payer: MEDICARE

## 2024-07-03 ENCOUNTER — TELEPHONE (OUTPATIENT)
Dept: PHYSICAL MEDICINE AND REHAB | Facility: CLINIC | Age: 79
End: 2024-07-03

## 2024-07-03 VITALS — HEIGHT: 67 IN | BODY MASS INDEX: 24.33 KG/M2 | WEIGHT: 155 LBS

## 2024-07-03 DIAGNOSIS — G44.86 CERVICOGENIC HEADACHE: ICD-10-CM

## 2024-07-03 DIAGNOSIS — M48.02 CERVICAL STENOSIS OF SPINE: ICD-10-CM

## 2024-07-03 DIAGNOSIS — M54.2 NECK PAIN ON RIGHT SIDE: ICD-10-CM

## 2024-07-03 DIAGNOSIS — M47.812 ARTHROPATHY OF CERVICAL FACET JOINT: Primary | ICD-10-CM

## 2024-07-03 DIAGNOSIS — M50.90 CERVICAL DISC DISEASE: ICD-10-CM

## 2024-07-03 DIAGNOSIS — M54.12 CERVICAL RADICULOPATHY: ICD-10-CM

## 2024-07-03 DIAGNOSIS — Q76.1 CERVICAL FUSION SYNDROME: ICD-10-CM

## 2024-07-03 DIAGNOSIS — I10 ESSENTIAL HYPERTENSION: ICD-10-CM

## 2024-07-03 PROCEDURE — 99214 OFFICE O/P EST MOD 30 MIN: CPT | Performed by: PHYSICAL MEDICINE & REHABILITATION
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
## 2024-07-03 NOTE — PATIENT INSTRUCTIONS
Plan  I will do the right C6 TFESI again.    If she is still having the occipital pain after this, then she might need to have an occipital nerve block again.    She will continue with her current medications.    The patient will follow up in 2-3 months, but the patient will call me 2 weeks after having the injection to let me know how the injection worked.

## 2024-07-03 NOTE — TELEPHONE ENCOUNTER
Per CMS Guidelines -no authorization is required for Right C6 transforaminal epidural steroid injection CPT 45075     Status: Authorization is not required based on medical necessity however is not a guarantee of payment and may be subject to review once claim is submitted-Covered Benefit

## 2024-07-05 NOTE — TELEPHONE ENCOUNTER
Patient has been scheduled for Right C6 transforaminal epidural steroid injection on 7/16/24 at the [facility] with Dr. Razo.   Anesthesia type:  Local  Please note: The [facility] will call the business day prior to discuss the exact time/arrival and additional instructions for your appointment.  Patient was advised that if he/she does receive the covid vaccine it needs to be at least 2 weeks before or after the injection.  Medications and allergies reviewed.  Educated to hold NSAIDS (Aleve, Ibuprofen, Motrin, Advil) and anti-inflammatories (Meloxicam, Naproxen, Diclofenac, Celebrex) and for cervical injections must hold Multi-Vitamins, Vitamin E, Fish Oil/Omega-3.  If on blood thinner, clearance has been received and approved to hold this medication by provider.   Patient informed of [facility]'s  policy:  he/she will need a  to and from procedure and must be on site for their entirety of their visit, if their ride is unable to the procedure will be cancelled.   [facility] is located in the [facility] 1st floor [address], [place] [address].   may park in the yellow/purple parking lot.  Patient verbalized understanding and agrees with plan.  Scheduled in Epic: Yes  Scheduled in Surgical Case: Yes  Follow up appointment made: NOV: Visit date not found

## 2024-07-08 ENCOUNTER — APPOINTMENT (OUTPATIENT)
Dept: PHYSICAL THERAPY | Age: 79
End: 2024-07-08
Attending: PHYSICAL MEDICINE & REHABILITATION
Payer: MEDICARE

## 2024-08-05 ENCOUNTER — TELEPHONE (OUTPATIENT)
Dept: PHYSICAL MEDICINE AND REHAB | Facility: CLINIC | Age: 79
End: 2024-08-05

## 2024-08-05 NOTE — TELEPHONE ENCOUNTER
Patient calling to leave a message regarding condition since last visit. Condition update: \"no change, pain is the same on a scale of 1 to 10 pain is a 7.\"

## 2024-10-02 ENCOUNTER — OFFICE VISIT (OUTPATIENT)
Dept: PHYSICAL MEDICINE AND REHAB | Facility: CLINIC | Age: 79
End: 2024-10-02
Payer: MEDICARE

## 2024-10-02 ENCOUNTER — TELEPHONE (OUTPATIENT)
Dept: PHYSICAL MEDICINE AND REHAB | Facility: CLINIC | Age: 79
End: 2024-10-02

## 2024-10-02 VITALS — WEIGHT: 155 LBS | BODY MASS INDEX: 24.33 KG/M2 | HEIGHT: 67 IN

## 2024-10-02 DIAGNOSIS — M54.2 NECK PAIN ON RIGHT SIDE: ICD-10-CM

## 2024-10-02 DIAGNOSIS — M48.02 CERVICAL STENOSIS OF SPINE: ICD-10-CM

## 2024-10-02 DIAGNOSIS — M54.81 OCCIPITAL NEURALGIA OF RIGHT SIDE: ICD-10-CM

## 2024-10-02 DIAGNOSIS — M50.90 CERVICAL DISC DISEASE: ICD-10-CM

## 2024-10-02 DIAGNOSIS — G44.86 CERVICOGENIC HEADACHE: ICD-10-CM

## 2024-10-02 DIAGNOSIS — Q76.1 CERVICAL FUSION SYNDROME: ICD-10-CM

## 2024-10-02 DIAGNOSIS — I10 ESSENTIAL HYPERTENSION: ICD-10-CM

## 2024-10-02 DIAGNOSIS — M54.12 CERVICAL RADICULOPATHY: ICD-10-CM

## 2024-10-02 DIAGNOSIS — M47.812 ARTHROPATHY OF CERVICAL FACET JOINT: Primary | ICD-10-CM

## 2024-10-02 PROCEDURE — 99214 OFFICE O/P EST MOD 30 MIN: CPT | Performed by: PHYSICAL MEDICINE & REHABILITATION

## 2024-10-02 PROCEDURE — 64405 NJX AA&/STRD GR OCPL NRV: CPT | Performed by: PHYSICAL MEDICINE & REHABILITATION

## 2024-10-02 RX ORDER — LIDOCAINE HYDROCHLORIDE 10 MG/ML
2 INJECTION, SOLUTION INFILTRATION; PERINEURAL ONCE
Status: COMPLETED | OUTPATIENT
Start: 2024-10-02 | End: 2024-10-02

## 2024-10-02 RX ORDER — TRIAMCINOLONE ACETONIDE 40 MG/ML
40 INJECTION, SUSPENSION INTRA-ARTICULAR; INTRAMUSCULAR ONCE
Status: COMPLETED | OUTPATIENT
Start: 2024-10-02 | End: 2024-10-02

## 2024-10-02 NOTE — TELEPHONE ENCOUNTER
Per CMS Guidelines -no authorization is required for Greater occipital nerve block   CPT code: 41757  Completed in the office today       Status: Authorization is not required based on medical necessity however is not a guarantee of payment and may be subject to review once claim is submitted-Covered Benefit.

## 2024-10-02 NOTE — PROGRESS NOTES
Cervical Pain H & P    Chief Complaint:    Chief Complaint   Patient presents with    Follow - Up     LOV 7/3/24 pt is here for a follow up. No N/T.No current physical therapy. Not taking any pain meds or muscle relaxer's. Pain 9/10     Nursing note reviewed and verified.    Patient was last seen on 7/3/2024.  I did the right C6 TFESI on 7/12/2024 which did not help. It has helped for a few weeks when I did it in March.  The pain got worse when she was on vacation.      Description of the Pain  The pain is located in the  right occipital region pain that radiates to the top of the head and goes into the frontal head and now into the right lateral and posterior neck .    The pain at its best is 7/10. The pain at its worst is 10/10. The pain is currently  8/10.  The pain is described as a(n) dull sensation.    The patient reports no numbness.  The patient reports no tingling.  There is weakness in right hands.  The pain is worse looking to the right, looking down, and in the morning and evening.   The pain is better with nothing.       Past Medical History   Past Medical History:    Disorder of thyroid    Essential hypertension    High cholesterol       Past Surgical History   Past Surgical History:   Procedure Laterality Date    Back surgery      Hysterectomy      Knee replacement surgery N/A     X2    Other surgical history      Trigger finger release N/A     X4       Family History   Family History   Problem Relation Age of Onset    Cancer Mother     Heart Disease Father     Heart Attack Sister        Social History   Social History     Socioeconomic History    Marital status:      Spouse name: Not on file    Number of children: Not on file    Years of education: Not on file    Highest education level: Not on file   Occupational History    Not on file   Tobacco Use    Smoking status: Never    Smokeless tobacco: Never   Vaping Use    Vaping status: Never Used   Substance and Sexual Activity    Alcohol use: No     Drug use: No    Sexual activity: Not on file   Other Topics Concern    Caffeine Concern No    Exercise Yes    Seat Belt Not Asked    Special Diet Not Asked    Stress Concern Not Asked    Weight Concern Not Asked   Social History Narrative    Not on file     Social Determinants of Health     Financial Resource Strain: Not on file   Food Insecurity: Not on file   Transportation Needs: Not on file   Physical Activity: Not on file   Stress: Not on file   Social Connections: Not on file   Housing Stability: At Risk (8/18/2023)    Received from [facility]     Living Situation: Not on file     Housing Problems: Not on file       PE:  The patient does appear in her stated age in no distress.  The patient is well groomed.    Psychiatric:  The patient is alert and oriented x 3.  The patient has a normal affect and mood.      Respiratory:  No acute respiratory distress. Patient does not have a cough.    HEENT:  Extraocular muscles are intact. There is no kern icterus. Pupils are equal, round, and reactive to light. No redness or discharge bilaterally.    Skin:  There are no rashes or lesions.    Lymph Nodes:  The patient has no palpable submandibular, supraclavicular, and cervical lymph nodes..    Vitals:  There were no vitals filed for this visit.    Cervical Spine:    Posture: mild-moderate chin forward superiorly rotated protracted shoulder posture.   Shoulders: Level   Head: In neutral   Spinous Processes Palpations: Tender at C6 and C7   Z-Joints Palpations: Tender at  right OA, right C1-2, bilateral C2-3, right C3-4, right C4-5, right C5-6, and right C6-7   Muscular Palpations: Tender at  right upper trapezius  right levator scapula(e)  right rhomboid  bilateral pectoralis minor  bilateral scalene   Cervical Flexion: 40 degrees Gives the patient pain in the right neck   Cervical Extension: 30 degrees Painless   RIGHT rotation: 60 degrees Gives the patient pain in the right neck   LEFT  rotation: 60 degrees Painless     Vascular upper extremity:   Right radial pulses: 2+   Left radial pulses: 2+      Neurological Upper Extremity:    Light Touch: Intact in Bilateral upper extremities.   Pin Prick: Not tested.   UE Muscle Strength: All Upper Extremity strength measurements 5/5 except:  Triceps Right: 4-/5   Reflexes: 2+ In the bilateral upper extremities.   Lopez's sign Right: Negative   Lopez's sigh Left: Negative     Assessment  1. Arthropathy of cervical facet joint: right mod-severe C2-3, C3-4 right mod    2. C3-4 mild diffuse, C5-6 mod-large diffuse, C6-7 mod diffuse bulging discs    3. C5-6 mod-severe, C6-7 mild-mod central stenosis    4. 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 Cervicogenic headache    6. right C6 radiculopathy    7. Essential hypertension    8. Neck pain on right side    9. Occipital neuralgia of right side        Plan  I did a right greater occipital nerve block in the office today.  (See procedure note)    If the above helps her, then I will do a right greater occipital radiofrequency neurotomy.    She will see Dr. Alves for a surgical opinion.    She will let me know how the injections has worked int 2 weeks.    She will follow up in 3 months or sooner if needed.    The patient understands and agrees with the stated plan.    Real Razo MD  10/2/2024

## 2024-10-02 NOTE — PATIENT INSTRUCTIONS
Plan  I did a right greater occipital nerve block in the office today.  (See procedure note)    If the above helps her, then I will do a right greater occipital radiofrequency neurotomy.    She will see Dr. Alves for a surgical opinion.    She will let me know how the injections has worked int 2 weeks.    She will follow up in 3 months or sooner if needed.

## 2024-10-02 NOTE — PROCEDURES
I did a right greater occipital nerve block in the office today.  The point of maximal tenderness over the right posterior occipital region was identified.  A arleen was placed on the patient's skin.  The skin was cleaned with alcohol swaps x 3.  The skin was anesthetized with ethyl chloride spray.  A 27 gauge needle was inserted and the patient was injected with a 3 ml solution of 2 ml of 1% lidocaine without epinephrine and 1 ml of 40 mg of Kenalog/ml.  The needle was removed and a band aid was applied.  The patient tolerated the procedure well.

## 2024-10-10 ENCOUNTER — OFFICE VISIT (OUTPATIENT)
Dept: SURGERY | Facility: CLINIC | Age: 79
End: 2024-10-10
Payer: MEDICARE

## 2024-10-10 ENCOUNTER — TELEPHONE (OUTPATIENT)
Dept: SURGERY | Facility: CLINIC | Age: 79
End: 2024-10-10

## 2024-10-10 VITALS
WEIGHT: 158.81 LBS | OXYGEN SATURATION: 94 % | BODY MASS INDEX: 24.92 KG/M2 | HEIGHT: 67 IN | DIASTOLIC BLOOD PRESSURE: 78 MMHG | HEART RATE: 79 BPM | SYSTOLIC BLOOD PRESSURE: 130 MMHG

## 2024-10-10 DIAGNOSIS — R51.9 OCCIPITAL PAIN: ICD-10-CM

## 2024-10-10 DIAGNOSIS — M54.2 NECK PAIN: Primary | ICD-10-CM

## 2024-10-10 DIAGNOSIS — M50.30 DEGENERATIVE CERVICAL DISC: ICD-10-CM

## 2024-10-10 DIAGNOSIS — Z98.1 HISTORY OF FUSION OF CERVICAL SPINE: ICD-10-CM

## 2024-10-10 DIAGNOSIS — R26.89 IMBALANCE: ICD-10-CM

## 2024-10-10 DIAGNOSIS — R29.898 RIGHT HAND WEAKNESS: ICD-10-CM

## 2024-10-10 DIAGNOSIS — M48.02 CERVICAL STENOSIS OF SPINE: ICD-10-CM

## 2024-10-10 PROCEDURE — 99204 OFFICE O/P NEW MOD 45 MIN: CPT | Performed by: STUDENT IN AN ORGANIZED HEALTH CARE EDUCATION/TRAINING PROGRAM

## 2024-10-10 NOTE — PROGRESS NOTES
New patient:  Reason for visit: Headache for almost 3 years located back right side all the time travels down the right side of the neck.     Estimated time of onset:  about 3 years in January.     Numeric Rating Scale:    Pain at Present:  7/10                                                                                                                       Distribution of Pain:    right    Past Treatments for Current Pain Condition:   Physical Therapy- last spring ended no relief   Injections last week -no relief  Occipital nerve block.      Prior diagnostic testing related to this condition:  Patient brought in disc.
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
## 2024-10-10 NOTE — TELEPHONE ENCOUNTER
Patient brought in disc with MRI cervical wo dated 5-18-23.    Uploaded to PACS.  Disc returned to patient.

## 2024-10-17 RX ORDER — FLUTICASONE FUROATE 27.5 UG/1
SPRAY, METERED NASAL
COMMUNITY

## 2024-10-17 RX ORDER — TOPIRAMATE 25 MG/1
1 TABLET, FILM COATED ORAL 2 TIMES DAILY
COMMUNITY
Start: 2022-10-10

## 2024-10-17 RX ORDER — CALCIUM CARBONATE/VITAMIN D3 500 MG-10
TABLET ORAL EVERY 12 HOURS
COMMUNITY

## 2024-10-17 RX ORDER — DENOSUMAB 60 MG/ML
INJECTION SUBCUTANEOUS
COMMUNITY

## 2024-10-17 RX ORDER — LOPERAMIDE HYDROCHLORIDE 2 MG/1
TABLET ORAL
COMMUNITY

## 2024-10-17 RX ORDER — CELECOXIB 100 MG/1
100 CAPSULE ORAL
COMMUNITY
Start: 2024-09-26 | End: 2024-10-26

## 2024-10-17 RX ORDER — ACETAMINOPHEN 500 MG
TABLET ORAL
COMMUNITY

## 2024-10-17 RX ORDER — FLUTICASONE PROPIONATE AND SALMETEROL 250; 50 UG/1; UG/1
POWDER RESPIRATORY (INHALATION) AS NEEDED
COMMUNITY

## 2024-10-17 RX ORDER — LORATADINE 10 MG/1
1 TABLET ORAL DAILY
COMMUNITY

## 2024-10-17 RX ORDER — BACITRACIN ZINC AND POLYMYXIN B SULFATE 500; 1000 [USP'U]/G; [USP'U]/G
OINTMENT TOPICAL EVERY 24 HOURS
COMMUNITY

## 2024-10-17 RX ORDER — ERGOCALCIFEROL 1.25 MG/1
1.25 CAPSULE ORAL WEEKLY
COMMUNITY
Start: 2024-09-27 | End: 2024-12-26

## 2024-10-17 RX ORDER — SCOLOPAMINE TRANSDERMAL SYSTEM 1 MG/1
PATCH, EXTENDED RELEASE TRANSDERMAL
COMMUNITY
Start: 2024-06-20
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
## 2024-10-21 ENCOUNTER — MED REC SCAN ONLY (OUTPATIENT)
Dept: SURGERY | Facility: CLINIC | Age: 79
End: 2024-10-21

## 2024-10-21 ENCOUNTER — TELEPHONE (OUTPATIENT)
Dept: SURGERY | Facility: CLINIC | Age: 79
End: 2024-10-21

## 2024-10-21 ENCOUNTER — OFFICE VISIT (OUTPATIENT)
Dept: SURGERY | Facility: CLINIC | Age: 79
End: 2024-10-21
Payer: MEDICARE

## 2024-10-21 VITALS — BODY MASS INDEX: 25.46 KG/M2 | WEIGHT: 162.19 LBS | HEIGHT: 67 IN

## 2024-10-21 DIAGNOSIS — M50.30 DEGENERATIVE CERVICAL DISC: ICD-10-CM

## 2024-10-21 DIAGNOSIS — R26.89 IMBALANCE: ICD-10-CM

## 2024-10-21 DIAGNOSIS — Z98.1 HISTORY OF FUSION OF CERVICAL SPINE: ICD-10-CM

## 2024-10-21 DIAGNOSIS — M54.2 NECK PAIN: Primary | ICD-10-CM

## 2024-10-21 DIAGNOSIS — R29.898 RIGHT HAND WEAKNESS: ICD-10-CM

## 2024-10-21 DIAGNOSIS — M48.02 CERVICAL STENOSIS OF SPINE: ICD-10-CM

## 2024-10-21 DIAGNOSIS — R51.9 OCCIPITAL PAIN: ICD-10-CM

## 2024-10-21 PROCEDURE — 99215 OFFICE O/P EST HI 40 MIN: CPT | Performed by: STUDENT IN AN ORGANIZED HEALTH CARE EDUCATION/TRAINING PROGRAM

## 2024-10-21 NOTE — TELEPHONE ENCOUNTER
[place] office received imaging report from [facility] via fax addressed to Dr. Alves.     10/11/2024 - 1.5 T MR Cervical Spine    Report left on Dr. Alves's desk for review.   Copy of report sent to the scanning department.

## 2024-10-21 NOTE — TELEPHONE ENCOUNTER
Patient brought imaging disc from [facility] to her appointment with Dr. Alves.     10/11/2024 - 1.5 T MR Cervical Spine    Film uploaded to PACS.  Disc returned to the patient.

## 2024-10-21 NOTE — PROGRESS NOTES
Established patient:  Reason for follow up: to review images.     Numeric Rating Scale:       Pain at Present:  7/10       Distribution of Pain:  right side head and right shoulder pain.     Most recent treatments for Current Pain Condition:   No recent epidural or pt.    New imaging or testing since your last office visit:  MRI Cervical Spine 10/10/2024.

## 2024-10-24 ENCOUNTER — OFFICE VISIT (OUTPATIENT)
Facility: LOCATION | Age: 79
End: 2024-10-24
Payer: MEDICARE

## 2024-10-24 VITALS — WEIGHT: 162 LBS | HEIGHT: 67 IN | BODY MASS INDEX: 25.43 KG/M2

## 2024-10-24 DIAGNOSIS — Z98.890 HISTORY OF NECK SURGERY: ICD-10-CM

## 2024-10-24 DIAGNOSIS — M54.2 CERVICALGIA: Primary | ICD-10-CM

## 2024-10-24 DIAGNOSIS — R51.9 OCCIPITAL PAIN: ICD-10-CM

## 2024-10-24 PROCEDURE — 99203 OFFICE O/P NEW LOW 30 MIN: CPT | Performed by: OTOLARYNGOLOGY

## 2024-10-24 PROCEDURE — 31575 DIAGNOSTIC LARYNGOSCOPY: CPT | Performed by: OTOLARYNGOLOGY
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
## 2024-10-25 ENCOUNTER — OFFICE VISIT (OUTPATIENT)
Dept: SURGERY | Facility: CLINIC | Age: 79
End: 2024-10-25
Payer: MEDICARE

## 2024-10-25 ENCOUNTER — TELEPHONE (OUTPATIENT)
Dept: SURGERY | Facility: CLINIC | Age: 79
End: 2024-10-25

## 2024-10-25 VITALS
BODY MASS INDEX: 25.43 KG/M2 | WEIGHT: 162 LBS | OXYGEN SATURATION: 96 % | HEART RATE: 75 BPM | SYSTOLIC BLOOD PRESSURE: 150 MMHG | HEIGHT: 67 IN | DIASTOLIC BLOOD PRESSURE: 84 MMHG

## 2024-10-25 DIAGNOSIS — R51.9 OCCIPITAL PAIN: ICD-10-CM

## 2024-10-25 DIAGNOSIS — R29.898 RIGHT HAND WEAKNESS: ICD-10-CM

## 2024-10-25 DIAGNOSIS — M50.30 DEGENERATIVE CERVICAL DISC: ICD-10-CM

## 2024-10-25 DIAGNOSIS — T45.1X5A IMMUNODEFICIENCY DUE TO LONG TERM IMMUNOSUPPRESSIVE DRUG THERAPY (HCC): ICD-10-CM

## 2024-10-25 DIAGNOSIS — Z98.1 HISTORY OF FUSION OF CERVICAL SPINE: ICD-10-CM

## 2024-10-25 DIAGNOSIS — M48.02 CERVICAL STENOSIS OF SPINE: ICD-10-CM

## 2024-10-25 DIAGNOSIS — D84.821 IMMUNODEFICIENCY DUE TO LONG TERM IMMUNOSUPPRESSIVE DRUG THERAPY (HCC): ICD-10-CM

## 2024-10-25 DIAGNOSIS — Z79.899 IMMUNODEFICIENCY DUE TO LONG TERM IMMUNOSUPPRESSIVE DRUG THERAPY (HCC): ICD-10-CM

## 2024-10-25 DIAGNOSIS — R26.89 IMBALANCE: ICD-10-CM

## 2024-10-25 DIAGNOSIS — M54.2 NECK PAIN: Primary | ICD-10-CM

## 2024-10-25 PROCEDURE — 99214 OFFICE O/P EST MOD 30 MIN: CPT | Performed by: STUDENT IN AN ORGANIZED HEALTH CARE EDUCATION/TRAINING PROGRAM

## 2024-10-25 NOTE — PATIENT INSTRUCTIONS
Refill policies:    Allow 2-3 business days for refills; controlled substances may take longer.  Contact your pharmacy at least 5 days prior to running out of medication and have them send an electronic request or submit request through the “request refill” option in your [employer] account.  Refills are not addressed on weekends; covering physicians do not authorize routine medications on weekends.  No narcotics or controlled substances are refilled after noon on Fridays or by on call physicians.  By law, narcotics must be electronically prescribed.  A 30 day supply with no refills is the maximum allowed.  If your prescription is due for a refill, you may be due for a follow up appointment.  To best provide you care, patients receiving routine medications need to be seen at least once a year.  Patients receiving narcotic/controlled substance medications need to be seen at least once every 3 months.  In the event that your preferred pharmacy does not have the requested medication in stock (e.g. Backordered), it is your responsibility to find another pharmacy that has the requested medication available.  We will gladly send a new prescription to that pharmacy at your request.    Scheduling Tests:    If your physician has ordered radiology tests such as MRI or CT scans, please contact Central Scheduling at [phone] right away to schedule the test.  Once scheduled, the [facility] Centralized Referral Team will work with your insurance carrier to obtain pre-certification or prior authorization.  Depending on your insurance carrier, approval may take 3-10 days.  It is highly recommended patients assure they have received an authorization before having a test performed.  If test is done without insurance authorization, patient may be responsible for the entire amount billed.      Precertification and Prior Authorizations:  If your physician has recommended that you have a procedure or additional testing performed the [facility]  Centralized Referral Team will contact your insurance carrier to obtain pre-certification or prior authorization.    You are strongly encouraged to contact your insurance carrier to verify that your procedure/test has been approved and is a COVERED benefit.  Although the [facility] Centralized Referral Team does its due diligence, the insurance carrier gives the disclaimer that \"Although the procedure is authorized, this does not guarantee payment.\"    Ultimately the patient is responsible for payment.   Thank you for your understanding in this matter.  Paperwork Completion:  If you require FMLA or disability paperwork for your recovery, please make sure to either drop it off or have it faxed to our office at [phone]. Be sure the form has your name and date of birth on it.  The form will be faxed to our Forms Department and they will complete it for you.  There is a 25$ fee for all forms that need to be filled out.  Please be aware there is a 10-14 day turnaround time.  You will need to sign a release of information (DIPIKA) form if your paperwork does not come with one.  You may call the Forms Department with any questions at [phone].  Their fax number is [phone].

## 2024-10-25 NOTE — PROGRESS NOTES
Last Office Visit: 10/21/2024     Established patient presents to clinic for a follow up after seeing ENT.
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
## 2024-11-01 NOTE — TELEPHONE ENCOUNTER
You are scheduled for TRANSCERVICAL EXPOSURE OF PRIOR CERVICAL 4 TO 5 FUSION, ANTERIOR CERVICAL 3 TO 4 AND 5 TO 6 DISCECTOMIES AND FUSIONS at [facility] on 12/10/24.    Pre-op clearance from your primary care provider is needed within 30 days of surgery.  We have faxed a clearance request to Dr. Lyles's office.  Please contact their office for appointment.  Please schedule this appointment AT LEAST 1 WEEK PRIOR TO SURGERY DATE.  Your PCP may order additional testing or clearance from another specialist.     You will have an appointment with our RN Spine Navigator prior to surgery.  This is an educational telehealth/phone visit in which you will receive more information on the specifics of your surgery, instructions for before surgery, what to expect in the hospital and how to take care of yourself after surgery.  Your surgeon wants you to to participate in this visit so that you are as prepared for surgery as possible and have an opportunity to ask questions.  If possible, we would like your care partner to be present for the visit as well.      Cardiac clearance is also being requested.  A fax requesting clearance has been faxed to Dr Choi's office. Please contact their office for an appointment.      You will need to contact the Pre-admission department at [phone] to schedule your pre-op testing.  They will get you scheduled for blood work and a MRSA test (nasal swab). You will need for fast for the blood work.  You may also need an EKG and/or chest x-ray depending on your current health status.  If they do not answer when you call, please leave a message and they will call you back.  They return calls in order of the date of surgery so it may be a few days before they return your call.      You will need to hold Excedrin Extra Strength 7 days prior to surgery.  Please address this with the prescribing physician.  Do not take any blood thinning medications, over the counter non-steroidal  anti-inflammatories (NSAIDS such as ibuprofen, advil, aleve etc.), herbal supplements (garlic,tumeric etc.), vitamin E, fish oil or krill oil for at least 7 days prior to surgery.  If you have questions about any of your other medications, please call our office or send a [employer] message.     You should have nothing to eat or drink after 11:00pm the night prior to surgery EXCEPT GATORADE:  You will need to drink 12 ounces (small bottle) of regular Gatorade (NOT RED) 12 hours and 4 hours prior to your scheduled surgery time. Do not drink any other liquids (including water) before your surgery. Do not chew gum or eat candies before surgery.  Take 1000 mg of Tylenol (Acetaminophen) 4 hours before your scheduled surgery time, take this with your scheduled Gatorade.  You should take your daily medications with the 4 hour Gatorade, unless instructed otherwise.     Surgery is usually scheduled as 2 night admission.  This is an estimate and varies from person to person.  Ultimately, the surgeon will determine when you are ready to be discharged.    Our office will contact your insurance for authorization of surgery.       The hospital will contact you 1-2 days before surgery with your expected arrival time and day-of instructions.     To prevent infection post-operatively, you will need to shower with Hibiclens soap for 5 days prior to surgery. The last shower should be the night before surgery.  Hibiclens soap can be found at any pharmacy in the first-aid section.  See detailed instructions at the end of this message.      FMLA/DISABILITY PAPERWORK  If you require FMLA or disability paperwork for your recovery, please make sure to either drop it off or have it faxed to our office at [phone]. Be sure the form has your name and date of birth on it.  The form will be faxed to our Forms Department and they will complete it for you.  There is a 25$ fee for all forms that need to be filled out.  Please be aware there is a  10-14 day turnaround time.  You will need to sign a release of information (DIPIKA) form if your paperwork does not come with one.  You may call the Forms Department with any questions at [phone].  Their fax number is [phone].     Hibiclens Bathing  Hibiclens is a body soap that is used before surgery protect you from getting an infection after surgery   Hibiclens can be found in most pharmacies in the First Aid supplies  Shower with this daily for FIVE consecutive days before surgery, using the entire bottle over the five days.  The last shower should be the night before surgery.   Steps to bathing with Hibiclens  Do not use Hibiclens on your hair, face or private areas  Wash your hair and face as normal with your usual cleansers  Rinse well  Using a clean wet washcloth apply enough Hibiclens to cover your body. Wash from the neck down avoiding the genital areas and concentrating on the surgical area  Rinse well  Dry yourself with a clean, dry towel  Do not use any powders, creams, lotions or sprays on your body as these attract bacteria  Deodorant and facial creams are acceptable.   (Laundering/cleaning: Chlorhexidine gluconate skin cleansers will cause stains if used with chlorine releasing products. Rinse completely and use only non-chlorine detergents.)    POST OP CARE--First Two Weeks  No bending at waist, twisting, pushing or pulling  No lifting more than 10 lb (a gallon of milk)  Wear cervical collar/lumbar brace, if prescribed, as instructed by surgeon  No overhead reaching  For anterior cervical surgeries, begin with eating soft foods and thick liquids for the first few days.  It is normal to have some discomfort when swallowing.  Return to normal diet as tolerated.   No driving until approved by your surgeon   Change positions frequently. No prolonged sitting or standing.  Increase walking as tolerated to avoid blood clots  No NSAIDs until approved by surgeon (ibuprofen, aleve, advil, meloxicam,  Celebrex, diclofenac etc).   Remove any bandage on post op day 3.  Leave incision open to air.  Glue will fall off on its own.  If you have staples or sutures that are not dissolvable, these will be removed at your 2 week post op visit.   Check your incision for signs of infection daily (redness, warmth, drainage, increased pain at incision site, fever)  Do not shower until post op day 3.  Do not allow water pressure to directly hit the incision.  Do not scrub the incision and avoid any lotion, creams or perfume near the incision site.  No swimming, hot tubs or baths until your incision is completely healed  Take pain medication as prescribed, if needed.  Constipation is a common side effect of opioids.  If you experience constipation, drink plenty of water and take over-the-counter stool softeners daily.   Avoid nicotine and alcohol   When to call the office:  Increased or change in location of pain  Increased weakness in arms or legs  Incision drainage, redness or warmth  Fever  Bowel or bladder changes   Choking on food or liquids (cervical)  Pain, redness, swelling, color changes or warmth in lower leg  [facility] Future Appointments    Encounter Information   Provider Department Center   11/19/2024 10:30 AM SPINE NAVIGATOR [facility]    12/5/2024 8:00 AM Gerald Alves MD [facility]   12/26/2024 9:15 AM Derek Robin PA-C [facility]   1/10/2025 9:45 AM Derek Robin PA-C [facility]   3/10/2025 8:00 AM Gerald Alves MD       For Office Use Only:    PCP Medical and Cardiac Clearances Requested  PA: Medicare  CPT Codes: 00398, 31401, 23953, [id number], 89556,

## 2024-11-01 NOTE — TELEPHONE ENCOUNTER
Patient is scheduled for TRANSCERVICAL EXPOSURE OF PRIOR CERVICAL 4 TO 5 FUSION, ANTERIOR CERVICAL 3 TO 4 AND 5 TO 6 DISCECTOMIES AND FUSIONS at [facility] on 12/10/24.    Y  pre-op apt scheduled (if sx is more than 30 days from last apt)  Y  pre-op apt scheduled with RN spine navigator  Y   Surgical instructions reviewed by nursing staff with patient  --   form completed  Y  Surgery order signed   -- Placed sx on surgery sheet  Y Placed on outlook calendar  Y  LoggedInt message sent to patient with sx instructions  Y  Faxed pre-op clearance request to PCP Dr. Lyles.    Y  Faxed clearance letter to Cardiologist Dr. Avendaño    Y  Post-op appointments made  Y  Medicare- Routed to PA team to initiate.  Y  Post-Op outreach pt reminder placed.   Y  Entire Neurosurgery Checklist Completed    Clearances: PCP, Cards  PA: Medicare  CPT Codes: 99808, 76207, 42242, 69564, 06960

## 2024-11-10 ENCOUNTER — HOSPITAL ENCOUNTER (OUTPATIENT)
Dept: GENERAL RADIOLOGY | Age: 79
Discharge: HOME | End: 2024-11-10
Attending: STUDENT IN AN ORGANIZED HEALTH CARE EDUCATION/TRAINING PROGRAM
Payer: MEDICARE

## 2024-11-10 ENCOUNTER — HOSPITAL ENCOUNTER (OUTPATIENT)
Dept: GENERAL RADIOLOGY | Age: 79
End: 2024-11-10
Attending: STUDENT IN AN ORGANIZED HEALTH CARE EDUCATION/TRAINING PROGRAM
Payer: MEDICARE

## 2024-11-10 DIAGNOSIS — M54.2 NECK PAIN: ICD-10-CM

## 2024-11-10 DIAGNOSIS — R26.89 IMBALANCE: ICD-10-CM

## 2024-11-10 DIAGNOSIS — Z98.1 HISTORY OF FUSION OF CERVICAL SPINE: ICD-10-CM

## 2024-11-10 DIAGNOSIS — M50.30 DEGENERATIVE CERVICAL DISC: ICD-10-CM

## 2024-11-10 DIAGNOSIS — M48.02 CERVICAL STENOSIS OF SPINE: ICD-10-CM

## 2024-11-10 DIAGNOSIS — R29.898 RIGHT HAND WEAKNESS: ICD-10-CM

## 2024-11-10 DIAGNOSIS — R51.9 OCCIPITAL PAIN: ICD-10-CM

## 2024-11-10 PROCEDURE — 71045 X-RAY EXAM CHEST 1 VIEW: CPT | Performed by: STUDENT IN AN ORGANIZED HEALTH CARE EDUCATION/TRAINING PROGRAM

## 2024-11-11 ENCOUNTER — LAB ENCOUNTER (OUTPATIENT)
Dept: LAB | Facility: HOSPITAL | Age: 79
End: 2024-11-11
Attending: STUDENT IN AN ORGANIZED HEALTH CARE EDUCATION/TRAINING PROGRAM
Payer: MEDICARE

## 2024-11-11 DIAGNOSIS — R26.89 IMBALANCE: ICD-10-CM

## 2024-11-11 DIAGNOSIS — R51.9 OCCIPITAL PAIN: ICD-10-CM

## 2024-11-11 DIAGNOSIS — Z98.1 HISTORY OF FUSION OF CERVICAL SPINE: ICD-10-CM

## 2024-11-11 DIAGNOSIS — R29.898 RIGHT HAND WEAKNESS: ICD-10-CM

## 2024-11-11 DIAGNOSIS — M54.2 NECK PAIN: ICD-10-CM

## 2024-11-11 DIAGNOSIS — M48.02 CERVICAL STENOSIS OF SPINE: ICD-10-CM

## 2024-11-11 DIAGNOSIS — M50.30 DEGENERATIVE CERVICAL DISC: ICD-10-CM

## 2024-11-11 LAB
ALBUMIN SERPL-MCNC: 4.9 G/DL (ref 3.2–4.8)
ALBUMIN/GLOB SERPL: 2 {RATIO} (ref 1–2)
ALP LIVER SERPL-CCNC: 58 U/L
ALT SERPL-CCNC: 20 U/L
ANION GAP SERPL CALC-SCNC: 5 MMOL/L (ref 0–18)
ANTIBODY SCREEN: NEGATIVE
APTT PPP: 29.3 SECONDS (ref 23–36)
AST SERPL-CCNC: 8 U/L (ref ?–34)
BASOPHILS # BLD AUTO: 0.07 X10(3) UL (ref 0–0.2)
BASOPHILS NFR BLD AUTO: 0.9 %
BILIRUB SERPL-MCNC: 0.9 MG/DL (ref 0.2–1.1)
BUN BLD-MCNC: 13 MG/DL (ref 9–23)
BUN/CREAT SERPL: 20.3 (ref 10–20)
CALCIUM BLD-MCNC: 9.9 MG/DL (ref 8.7–10.4)
CHLORIDE SERPL-SCNC: 108 MMOL/L (ref 98–112)
CO2 SERPL-SCNC: 30 MMOL/L (ref 21–32)
CREAT BLD-MCNC: 0.64 MG/DL
DEPRECATED RDW RBC AUTO: 43.8 FL (ref 35.1–46.3)
EGFRCR SERPLBLD CKD-EPI 2021: 91 ML/MIN/1.73M2 (ref 60–?)
EOSINOPHIL # BLD AUTO: 0.15 X10(3) UL (ref 0–0.7)
EOSINOPHIL NFR BLD AUTO: 1.9 %
ERYTHROCYTE [DISTWIDTH] IN BLOOD BY AUTOMATED COUNT: 13.5 % (ref 11–15)
FASTING STATUS PATIENT QL REPORTED: YES
GLOBULIN PLAS-MCNC: 2.5 G/DL (ref 2–3.5)
GLUCOSE BLD-MCNC: 86 MG/DL (ref 70–99)
HCT VFR BLD AUTO: 45 %
HGB BLD-MCNC: 15.3 G/DL
IMM GRANULOCYTES # BLD AUTO: 0.03 X10(3) UL (ref 0–1)
IMM GRANULOCYTES NFR BLD: 0.4 %
INR BLD: 0.99 (ref 0.8–1.2)
LYMPHOCYTES # BLD AUTO: 2.2 X10(3) UL (ref 1–4)
LYMPHOCYTES NFR BLD AUTO: 28.3 %
MCH RBC QN AUTO: 30.1 PG (ref 26–34)
MCHC RBC AUTO-ENTMCNC: 34 G/DL (ref 31–37)
MCV RBC AUTO: 88.4 FL
MONOCYTES # BLD AUTO: 0.6 X10(3) UL (ref 0.1–1)
MONOCYTES NFR BLD AUTO: 7.7 %
NEUTROPHILS # BLD AUTO: 4.72 X10 (3) UL (ref 1.5–7.7)
NEUTROPHILS # BLD AUTO: 4.72 X10(3) UL (ref 1.5–7.7)
NEUTROPHILS NFR BLD AUTO: 60.8 %
OSMOLALITY SERPL CALC.SUM OF ELEC: 295 MOSM/KG (ref 275–295)
PLATELET # BLD AUTO: 308 10(3)UL (ref 150–450)
POTASSIUM SERPL-SCNC: 4.2 MMOL/L (ref 3.5–5.1)
PROT SERPL-MCNC: 7.4 G/DL (ref 5.7–8.2)
PROTHROMBIN TIME: 13.7 SECONDS (ref 11.6–14.8)
RBC # BLD AUTO: 5.09 X10(6)UL
RH BLOOD TYPE: NEGATIVE
SODIUM SERPL-SCNC: 143 MMOL/L (ref 136–145)
WBC # BLD AUTO: 7.8 X10(3) UL (ref 4–11)

## 2024-11-11 PROCEDURE — 86850 RBC ANTIBODY SCREEN: CPT

## 2024-11-11 PROCEDURE — 85610 PROTHROMBIN TIME: CPT | Performed by: STUDENT IN AN ORGANIZED HEALTH CARE EDUCATION/TRAINING PROGRAM

## 2024-11-11 PROCEDURE — 36415 COLL VENOUS BLD VENIPUNCTURE: CPT | Performed by: STUDENT IN AN ORGANIZED HEALTH CARE EDUCATION/TRAINING PROGRAM

## 2024-11-11 PROCEDURE — 87081 CULTURE SCREEN ONLY: CPT

## 2024-11-11 PROCEDURE — 86900 BLOOD TYPING SEROLOGIC ABO: CPT

## 2024-11-11 PROCEDURE — 85730 THROMBOPLASTIN TIME PARTIAL: CPT | Performed by: STUDENT IN AN ORGANIZED HEALTH CARE EDUCATION/TRAINING PROGRAM

## 2024-11-11 PROCEDURE — 86901 BLOOD TYPING SEROLOGIC RH(D): CPT

## 2024-11-11 PROCEDURE — 80053 COMPREHEN METABOLIC PANEL: CPT | Performed by: STUDENT IN AN ORGANIZED HEALTH CARE EDUCATION/TRAINING PROGRAM

## 2024-11-11 PROCEDURE — 85025 COMPLETE CBC W/AUTO DIFF WBC: CPT | Performed by: STUDENT IN AN ORGANIZED HEALTH CARE EDUCATION/TRAINING PROGRAM

## 2024-11-19 ENCOUNTER — NURSE ONLY (OUTPATIENT)
Age: 79
End: 2024-11-19
Payer: MEDICARE

## 2024-11-19 DIAGNOSIS — Z71.9 ENCOUNTER FOR EDUCATION: Primary | ICD-10-CM

## 2024-11-19 NOTE — PROGRESS NOTES
RN Spine Navigator Education for Alejandra     If you have received instructions from your surgeon that are different from those listed below, please follow your physician's instructions.    You are scheduled for a Cervical fusion with Dr. Alves on 12/10.      Patient Surgical Goals: Decreased headaches.     Before Your Surgery    Choose a Care Partner  Patient attended spine navigator visit with care partner.  Care partner is Abdullahi. Your care partner(s) should be able to provide transportation to and from the hospital. They should be able to help at home for the first week after discharge, including helping you with meals, medication, and dressing changes.    Clearance Before Surgery  You will need to see your primary care provider within 30 days before surgery. Please make sure this appointment is at least a week before surgery as more testing or doctor visits may be ordered. Presurgical testing may include labs, nasal swab, imaging, EKG.   Make sure that you complete all preadmission testing so that surgery does not get delayed.    Home Environment  Assessed home status: bedroom and bathroom are on first floor. Suggested arrangements for help at home.  It is recommended that you prepare your home by putting clean sheets on your bed, freezing meals, and putting frequently used items at waist level.   Prevent falls by removing items that could cause you to trip, adding nightlights and adding a nonskid mat in shower.   Assistive devices can be purchased at a medical supply store or online including canes, walkers, toileting devices (commodes, raised toilet seats, toilet paper wiping aid), long handled sponge, shower chair or tub transfer bench, grabber/reacher tool, sock aid, long handled shoehorn, if needed.      Tobacco, Nicotine and Marijuana Use   Not applicable    Medications to Stop   For 7-10 days before surgery do not take any blood thinning medications. This includes non-steroidal anti-inflammatories or NSAIDs  (Advil, Aleve, Motrin, ibuprofen, naproxen, meloxicam, diclofenac, celecoxib, etc.), aspirin (unless told otherwise by your cardiologist or surgeon), herbal supplements and vitamins (garlic, turmeric, vitamin E, fish oil or krill oil, etc.). You may only take Tylenol or prescribed narcotic medication if needed for pain.   Other medications to stop include: hold ACE/ARBS day of surgery    Leading Up to Day of Surgery  Five days before surgery wash with Hibiclens soap after your regular body soap every day. Do not put use Hibiclens on your face, hair or privates. Your last shower should be the night before surgery.  One business day before surgery, the preadmission testing staff will call you and let you know what time to arrive, where to park and will provide any additional instructions.   After 11pm the day before surgery, do not eat or drink anything (including water, gum, or candies) except for Tylenol and Gatorade.   Drink 12 ounces of regular Gatorade (NOT RED) 12 hours prior to your scheduled surgery time. Drink your second 12 ounces of regular Gatorade and take 1000 mg of Tylenol (acetaminophen) 4 hours before your scheduled surgery time.     Items to Bring to the Hospital   Bring insurance card, ID, advanced directive, or medical power of  paperwork, loose fitting clothing, shoes with a back that can accommodate swollen feet, long phone charging cord, glasses and hearing aids.  Do not bring jewelry, valuables, or medications.   If you take an uncommon medication that the hospital may not have, it must be brought to the hospital in the original container, and you must notify the nurse of this medication.     In the Hospital     Operative Day and Hospital Stay  In the preoperative area, you will change into a gown, have an IV placed in your hand or arm by the nurse, and sign any consent paperwork that is needed for your procedure. You will speak to the surgeon and anesthesiologist. It is important to  tell the doctors and nurses if you have had any significant side effects from pain medications or anesthesia such as a rash or severe nausea.    In the operating room, the anesthesiologist will attach monitors, give you oxygen through a mask, and give you medicine through the IV to fall asleep. After you are sleeping, the breathing tube will be placed. The surgeon may use additional nerve monitoring during your surgery. This is to make sure that the muscles and nerves in your arms and legs are working normally as he operates. The equipment will be hooked up and removed while you are asleep. You will wake up on the stretcher.     During the surgery, your care partner can sit in the surgical waiting area. There are TV screens in that area that keep them informed of your progress.     In the recovery room, monitors will be attached that check your heart rate, blood pressure and oxygen levels. While you may not remember this part, a nurse is with you and constantly checking on your condition. Medications for pain and nausea will be given if you need them. You may have a lozada catheter to empty your bladder or a drain at your surgical site. Your family is not allowed in the recovery room. When you are ready to leave the recovery room, you will be transported on your stretcher to the inpatient unit accompanied by your family once a room is available.  On the inpatient unit, a team of doctors and advanced practice providers will manage your care. The spine care nurses will check your blood pressure, temperature, heartbeat, breathing, stomach sounds and your incision. They may assess the strength and sensation in your arms and legs. Medications that are given in the hospital include antibiotics, IV fluids, pain medications, muscle relaxers, stool softeners, and your home medications. You may get blood drawn and another x-ray. Physical and occupational therapists may come to your room to teach you how to move around safely  after surgery.     Post Op Plan   The average length of hospital stay is one to three days. A  may visit you to help arrange extra care for you once you leave the hospital. Occasionally, it is recommended that a home health nurse or therapist visit you in your home for a short time. The best place to recover is your own home, but if you need more assistance than home health and your care partner can provide, the  will help you and your family choose other facilities to help you recover your strength.    Preventing surgical complications  It is important to follow all instructions before and after surgery to decrease the risks of surgery and prevent complications.     Blood clots: walk, wear leg compression devices in the hospital, and do ankle pumps at home  Infection: wash with Hibiclens before surgery, wash your hands, don't touch your incision  Pneumonia: take deep breaths and cough and use the breathing exercise (incentive spirometer)   Nausea/vomiting: start with liquids and small meals and do not take pills on an empty stomach  Constipation: drink water and walk frequently    Tell your nurse if you are experiencing nausea, vomiting or constipation as they have medications to help treat these.      At home     Understanding Pain After Surgery  We will do our best to manage your pain after surgery, but it is not possible to be completely pain-free. There will be operative pain in your back or neck. Pain in the arms or legs may be one of the first things to improve. Numbness, weakness, and tingling should improve over time. However, there can be a temporary increase in symptoms in the first few days due to inflammation from surgery.   Pain medications will be prescribed to take home at discharge. The goal of pain medicine after surgery is to make your pain tolerable, not to make you pain free. We encourage you to use the medication prescribed to you after your surgery, but please take the  lowest possible dose to manage your pain. Taking high doses of narcotics can cause side effects. Transition to plain Tylenol when your pain improves. You may get more continuous pain relief by alternating between medications if you have multiple instead of taking them at the same time. Write down when you have taken a medication as it may be difficult to remember after a few doses.    Post operative medication   Tylenol (acetaminophen): take for pain. Do not take more than 3000 mg - 4000 mg in 24 hours because it can damage your liver.   Narcotics: take for moderate to severe pain. Do not drink alcohol or drive while taking narcotics. Some narcotic medications (Norco, Tylenol #3, Percocet, Vicodin) contain Tylenol (acetaminophen). Make sure to not exceed the maximum dose if you are taking additional Tylenol with these medications.  Muscle relaxers: take for muscle cramping. These can cause drowsiness.    NSAIDS (Advil, Aleve, Motrin, ibuprofen, naproxen, meloxicam, diclofenac, celecoxib, etc.) or aspirin: Do not take these unless your physician says it is ok. For a fusion, it may be several months before you can take NSAIDS.  Stool softeners: take to prevent constipation while you are taking narcotic medications. You can get these over the counter at the pharmacy. You may use laxatives, which are stronger, if needed.    If you believe you will need more of medication your surgeon has prescribed to you, request a refill through your pharmacy or through the refill request in [employer] (log in, go to medications, then select refill request) at least two business days before you run out of medication.     Nonpharmacologic pain management   You may use ice on your incision for 20 minutes every hour to help with pain and swelling. Do not place ice directly on your skin. You can use heat to sooth your muscles but avoid placing heat directly on your incision. Make frequent position changes. You can do gentle stretching while  avoiding significant bending or twisting. Use deep breathing techniques and distractions such as TV, music, reading, or games.   Additional Recommendations for Anterior Cervical Surgery  Smoothies or protein shakes may be more comfortable to consume then solid food for the first week after surgery. To help decrease throat swelling, suck on ice chips and drink cold liquids. Cough drops can also be help decrease throat irritation.    Movement restrictions  After surgery, no bending or twisting your neck or back. Do not lift anything over 10lbs (about the weight of a gallon of milk) or lift anything over your shoulders. Avoid pulling or pushing. You may use stairs while holding the handrail.  It is ok to ride in the car but refrain from driving or traveling long distances until cleared to do so by your surgeon. You may not drive while taking narcotics or muscle relaxants. If you have cervical surgery, it may be several weeks before you can drive. , If you had a fracture or fusion surgery, your doctor may give you a brace. Braces are worn for comfort, when up and moving around, or constantly depending on your doctor's order. Wear your brace as instructed.     Post Op Exercise   Walk frequently. Start with walking short distances and increase as you start to feel better. Do ankle pumps (bending at your ankles, bring your feet towards your head then point them towards the ground) 15-20 times every hour when awake to help prevent blood clots.     Your surgeon will let you know at your post operative appointment if you are ready to decrease your movement restrictions and increase your exercise. If you have questions in between appointments about lessening your restrictions, please contact the office.     You and your doctor will discuss how you are feeling as you heal and decide if outpatient physical therapy or a medical fitness referral is needed.    Caring for your incision  Always wash your hands before touching your  incision. Your incision will be closed with sutures under the skin and skin glue or Steri-Strips (thin white bandages) on top of the skin. Do not attempt to peal off Skin glue/Steri-Strips as they will come off on their own. If the incision is closed with sutures or staples on top of the skin, they will be removed at a post op appointment. The incision may be covered with a gauze dressing that can come off after three days. Once the original gauze dressing is removed, we prefer that you leave your incision open to air (without a gauze dressing). If the incision has drainage or is rubbing against your clothes or brace, you may place gauze and medical tape over it. Change the gauze and medical tape daily. Look at your incision daily to check for signs of infection.  You can shower three days after your surgery or sooner if your surgeon allows. We recommend the care partner be present during the first shower for safety. Let soapy water run over the incision, but do not scrub it or spray it directly. Gently pat it dry after with a clean towel. Do not apply any creams or lotions to the incision. Do not soak in a tub, pool, or any body of water until your incision is fully healed.    Signs of Infection   Check your incision daily for swelling, redness, drainage, pus, bad smell, or opening of the incision.     When to Call for Assistance  Call the Neurosurgery Office ([phone] Option #2) if you experience signs of infection, opening of the incision, continuous nausea or vomiting, poor pain control despite using the pain medication as directed, a sudden increase in pain, temperature over 101F, difficulty swallowing, leg swelling, or with any concerns, unanswered questions, or new problems, such as new numbness/weakness/tingling.  Call 911 or go to the nearest emergency room if you experience chest pain, difficulty breathing, loss of bowel or bladder control, extreme drowsiness, or any other life-threatening situation.      Follow-up Plan   Appointments with Dr. Alves or Derek at 2-3, 4-6 and 12 weeks    Answered questions regarding: None     You can contact the RN Spine Navigator at [phone] or [email or web address] with additional questions or feedback on your care. It may take several business days to receive a reply so please do not call the RN spine navigator for refills or for emergencies.    Spine navigator spent 30 minutes conducting a virtual visit to provide education. Thank you for letting the RN Spine Navigator participate in your care.

## 2024-11-21 ENCOUNTER — TELEPHONE (OUTPATIENT)
Dept: SURGERY | Facility: CLINIC | Age: 79
End: 2024-11-21

## 2024-11-21 ENCOUNTER — MED REC SCAN ONLY (OUTPATIENT)
Dept: SURGERY | Facility: CLINIC | Age: 79
End: 2024-11-21

## 2024-11-21 NOTE — TELEPHONE ENCOUNTER
Left voice mail letting patient know provider will be out of  the office on 12/5/24 , appointment schedule for that day will need to be reschedule. I've tentative schedule it to 12/2/24 @ 10:30am since its a pre-op appointment. And his surgery in on 12/9/24.

## 2024-12-04 ENCOUNTER — PATIENT MESSAGE (OUTPATIENT)
Dept: SURGERY | Facility: CLINIC | Age: 79
End: 2024-12-04

## 2024-12-04 RX ORDER — ASPIRIN 81 MG/1
81 TABLET ORAL DAILY
COMMUNITY
End: 2024-12-23

## 2024-12-04 NOTE — TELEPHONE ENCOUNTER
Message below noted.    Called patient to discuss TE from 12/03/24. Patient advised of MyChart message.    Advised patient she should test for COVID if she has not already. Patient stated she has a test ready and will update us on results.    Advised patient to stay hydrated, drink electrolytes, warm liquids, and get plenty of rest.     Advised patient we would update Provider and reach back out with any other recommendations.     Patient acknowledged and appreciative of call.

## 2024-12-05 NOTE — TELEPHONE ENCOUNTER
Patient has messaged with an update regarding having a virus prior to 12.10.24 Surgery with Dr. Alves:    TRANSCERVICAL EXPOSURE OF PRIOR CERVICAL 4 TO 5 FUSION, ANTERIOR CERVICAL 3 TO 4 AND 5 TO 6 DISCECTOMIES AND FUSIONS     Patient states that:    -she tested negative for Covid.   -she will test again this weekend.   -she took an antihistamine which gave her enough relief so that she could rest.     Messaged patient an update that Dr. Alves and CHU Centeno will review her message next week and to contact PAT with any additional questions she may have pre-operatively.

## 2024-12-06 ENCOUNTER — OFFICE VISIT (OUTPATIENT)
Dept: FAMILY MEDICINE CLINIC | Facility: CLINIC | Age: 79
End: 2024-12-06
Payer: MEDICARE

## 2024-12-06 VITALS
OXYGEN SATURATION: 98 % | BODY MASS INDEX: 25.27 KG/M2 | HEART RATE: 82 BPM | SYSTOLIC BLOOD PRESSURE: 132 MMHG | RESPIRATION RATE: 16 BRPM | DIASTOLIC BLOOD PRESSURE: 86 MMHG | HEIGHT: 67 IN | WEIGHT: 161 LBS | TEMPERATURE: 99 F

## 2024-12-06 DIAGNOSIS — J06.9 VIRAL URI WITH COUGH: Primary | ICD-10-CM

## 2024-12-06 PROCEDURE — 99213 OFFICE O/P EST LOW 20 MIN: CPT | Performed by: NURSE PRACTITIONER

## 2024-12-06 RX ORDER — BENZONATATE 100 MG/1
100 CAPSULE ORAL 3 TIMES DAILY PRN
Qty: 30 CAPSULE | Refills: 0 | Status: SHIPPED | OUTPATIENT
Start: 2024-12-06

## 2024-12-07 NOTE — PROGRESS NOTES
CHIEF COMPLAINT:     Chief Complaint   Patient presents with    Cough     3 days, cough, runny nose, feeling warming than usual but under 100  OTC none       HPI:   Alejandra Green is a  79 year old female who presents for upper respiratory symptoms for  3 days. Patient reports congestion, low grade fever, fever with Tmax to 99.4, cough with clear to yellowish colored sputum, chest pain from coughing, denies sinus pain. Symptoms have been stable but not improving since onset.  Treating symptoms with otc.   Associated symptoms include sob with coughing jags.  Scheduled for surgery on 12/10 and has an appt with surgeon on 12/9.  Negative home covid test done 2 days ago.    Current Outpatient Medications   Medication Sig Dispense Refill    benzonatate 100 MG Oral Cap Take 1 capsule (100 mg total) by mouth 3 (three) times daily as needed for cough. >10 years old 30 capsule 0    nitrofurantoin monohydrate macro 100 MG Oral Cap TAKE 1 CAPSULE BY MOUTH TWICE DAILY WITH MEALS FOR 7 DAYS      aspirin 81 MG Oral Tab EC Take 1 tablet (81 mg total) by mouth daily.      acetaminophen (TYLENOL EXTRA STRENGTH) 500 MG Oral Tab 1 tablet as needed Orally every 6 hrs      Bacitracin-Polymyxin B 500-43800 UNIT/GM External Ointment daily.      denosumab (PROLIA) 60 MG/ML Subcutaneous Solution Prefilled Syringe Subcutaneous      ergocalciferol 1.25 MG (42277 UT) Oral Cap Take 1.25 mg by mouth once a week.      Fluticasone Furoate (FLONASE SENSIMIST) 27.5 MCG/SPRAY Nasal Suspension 1 spray in each nostril Nasally Once a day      Loperamide HCl (IMODIUM A-D) 2 MG Oral Tab 1 tab Orally one time a day      loratadine 10 MG Oral Tab Take 1 tablet (10 mg total) by mouth daily.      Scopolamine 1.5mg TD patch 1mg/3days 1 patch to skin behind the ear as needed Transdermal once every 3 days for 30 days      rosuvastatin 10 MG Oral Tab Take 1 tablet (10 mg total) by mouth daily.      Multiple Vitamin (MULTI-DAY) Oral Tab Take by mouth.       cholecalciferol (VITAMIN D3) 10 MCG (400 UNIT) Oral Tab Take 1 tablet (400 Units total) by mouth daily.      Calcium Carbonate 600 MG Oral Tab Take 1 tablet (600 mg total) by mouth.      Benazepril HCl 5 MG Oral Tab Take 1 tablet (5 mg total) by mouth daily.      Levothyroxine Sodium 75 MCG Oral Tab Take 1 tablet (75 mcg total) by mouth before breakfast.      gabapentin 300 MG Oral Cap Take 1 capsule (300 mg total) by mouth nightly.        Past Medical History:    Back problem    Disorder of thyroid    Essential hypertension    High cholesterol    Osteoarthritis    PONV (postoperative nausea and vomiting)    Visual impairment      Past Surgical History:   Procedure Laterality Date    Back surgery      Cholecystectomy      Cystoscopy,insert ureteral stent      Hernia surgery      Hysterectomy      Knee replacement surgery Bilateral     X2    Spine surgery procedure unlisted      Trigger finger release N/A     X4         Social History     Socioeconomic History    Marital status:    Tobacco Use    Smoking status: Never    Smokeless tobacco: Never   Vaping Use    Vaping status: Never Used   Substance and Sexual Activity    Alcohol use: No    Drug use: No   Other Topics Concern    Caffeine Concern No    Exercise Yes     Social Drivers of Health      Received from [facility], [facility]    [facility]         REVIEW OF SYSTEMS:   GENERAL: normal appetite  SKIN: no rashes or abnormal skin lesions  HEENT: See HPI  LUNGS: denies shortness of breath except with coughing jags or wheezing, See HPI  CARDIOVASCULAR: denies chest pain or palpitations   GI: denies N/V/C or abdominal pain  NEURO: Denies headaches    EXAM:   /86   Pulse 82   Temp 99.4 °F (37.4 °C)   Resp 16   Ht 5' 7\" (1.702 m)   Wt 161 lb (73 kg)   SpO2 98%   BMI 25.22 kg/m²   GENERAL: well developed, well nourished,in no apparent distress  SKIN: no rashes,no suspicious lesions  HEAD: atraumatic, normocephalic.  no tenderness on palpation  of  sinuses  EYES: conjunctiva clear, EOM intact  EARS: TM's intact, no bulging, no retraction,no fluid, bony landmarks visualized  NOSE: Nostrils patent, clear nasal discharge, nasal mucosa pink and inflamed    THROAT: Oral mucosa pink, moist. Posterior pharynx is slightly erythematous. no exudates. Tonsils 1/4. +PND    NECK: Supple, non-tender  LUNGS: clear to auscultation bilaterally, no wheezes or rhonchi. Breathing is non labored.  CARDIO: RRR without murmur  GI: active BS's x4,no masses, hepatosplenomegaly, or tenderness on direct palpation  EXTREMITIES: no cyanosis, clubbing or edema  LYMPH:  no lymphadenopathy.        ASSESSMENT AND PLAN:   Alejandra Green is a 79 year old female who presents with upper respiratory symptoms that are consistent with    ASSESSMENT:   Encounter Diagnosis   Name Primary?    Viral URI with cough Yes       PLAN: Symptoms most consistent with viral URI, Comfort care as described in Patient Instructions. To keep 12/9 appt with surgeon but to follow up at [facility] if symptoms worsen over the weekend.     Meds & Refills for this Visit:  Requested Prescriptions     Signed Prescriptions Disp Refills    benzonatate 100 MG Oral Cap 30 capsule 0     Sig: Take 1 capsule (100 mg total) by mouth 3 (three) times daily as needed for cough. >10 years old       Risks, benefits, and side effects of medication explained and discussed.    Education attached.    The patient indicates understanding of these issues and agrees to the plan.

## 2024-12-12 RX ORDER — AZITHROMYCIN 250 MG/1
250 TABLET, FILM COATED ORAL DAILY
COMMUNITY
End: 2024-12-23

## 2024-12-13 ENCOUNTER — LAB ENCOUNTER (OUTPATIENT)
Dept: LAB | Facility: HOSPITAL | Age: 79
End: 2024-12-13
Attending: STUDENT IN AN ORGANIZED HEALTH CARE EDUCATION/TRAINING PROGRAM
Payer: MEDICARE

## 2024-12-13 DIAGNOSIS — T45.1X5A IMMUNODEFICIENCY DUE TO LONG TERM IMMUNOSUPPRESSIVE DRUG THERAPY (HCC): ICD-10-CM

## 2024-12-13 DIAGNOSIS — D84.821 IMMUNODEFICIENCY DUE TO LONG TERM IMMUNOSUPPRESSIVE DRUG THERAPY (HCC): ICD-10-CM

## 2024-12-13 DIAGNOSIS — M54.2 NECK PAIN: ICD-10-CM

## 2024-12-13 DIAGNOSIS — M50.30 DEGENERATIVE CERVICAL DISC: ICD-10-CM

## 2024-12-13 DIAGNOSIS — Z79.899 IMMUNODEFICIENCY DUE TO LONG TERM IMMUNOSUPPRESSIVE DRUG THERAPY (HCC): ICD-10-CM

## 2024-12-13 DIAGNOSIS — M48.02 CERVICAL STENOSIS OF SPINE: ICD-10-CM

## 2024-12-13 DIAGNOSIS — R51.9 OCCIPITAL PAIN: ICD-10-CM

## 2024-12-13 DIAGNOSIS — R29.898 RIGHT HAND WEAKNESS: ICD-10-CM

## 2024-12-13 DIAGNOSIS — Z98.1 HISTORY OF FUSION OF CERVICAL SPINE: ICD-10-CM

## 2024-12-13 DIAGNOSIS — R26.89 IMBALANCE: ICD-10-CM

## 2024-12-13 LAB
ANTIBODY SCREEN: NEGATIVE
BASOPHILS # BLD: 0.1 X10(3) UL (ref 0–0.2)
BASOPHILS NFR BLD: 1 %
DEPRECATED RDW RBC AUTO: 41.5 FL (ref 35.1–46.3)
EOSINOPHIL # BLD: 0.5 X10(3) UL (ref 0–0.7)
EOSINOPHIL NFR BLD: 5 %
ERYTHROCYTE [DISTWIDTH] IN BLOOD BY AUTOMATED COUNT: 13 % (ref 11–15)
HCT VFR BLD AUTO: 44.5 %
HGB BLD-MCNC: 14.3 G/DL
LYMPHOCYTES NFR BLD: 2.48 X10(3) UL (ref 1–4)
LYMPHOCYTES NFR BLD: 25 %
MCH RBC QN AUTO: 28.2 PG (ref 26–34)
MCHC RBC AUTO-ENTMCNC: 32.1 G/DL (ref 31–37)
MCV RBC AUTO: 87.8 FL
METAMYELOCYTES # BLD: 0.3 X10(3) UL
METAMYELOCYTES NFR BLD: 3 %
MONOCYTES # BLD: 0.79 X10(3) UL (ref 0.1–1)
MONOCYTES NFR BLD: 8 %
MORPHOLOGY: NORMAL
NEUTROPHILS # BLD AUTO: 5.45 X10 (3) UL (ref 1.5–7.7)
NEUTROPHILS NFR BLD: 56 %
NEUTS BAND NFR BLD: 2 %
NEUTS HYPERSEG # BLD: 5.74 X10(3) UL (ref 1.5–7.7)
PLATELET # BLD AUTO: 336 10(3)UL (ref 150–450)
PLATELET MORPHOLOGY: NORMAL
RBC # BLD AUTO: 5.07 X10(6)UL
RH BLOOD TYPE: NEGATIVE
TOTAL CELLS COUNTED BLD: 100
WBC # BLD AUTO: 9.9 X10(3) UL (ref 4–11)

## 2024-12-13 PROCEDURE — 85007 BL SMEAR W/DIFF WBC COUNT: CPT | Performed by: STUDENT IN AN ORGANIZED HEALTH CARE EDUCATION/TRAINING PROGRAM

## 2024-12-13 PROCEDURE — 36415 COLL VENOUS BLD VENIPUNCTURE: CPT

## 2024-12-13 PROCEDURE — 85027 COMPLETE CBC AUTOMATED: CPT | Performed by: STUDENT IN AN ORGANIZED HEALTH CARE EDUCATION/TRAINING PROGRAM

## 2024-12-13 PROCEDURE — 87081 CULTURE SCREEN ONLY: CPT

## 2024-12-13 PROCEDURE — 86850 RBC ANTIBODY SCREEN: CPT

## 2024-12-13 PROCEDURE — 85025 COMPLETE CBC W/AUTO DIFF WBC: CPT | Performed by: STUDENT IN AN ORGANIZED HEALTH CARE EDUCATION/TRAINING PROGRAM

## 2024-12-13 PROCEDURE — 86901 BLOOD TYPING SEROLOGIC RH(D): CPT

## 2024-12-13 PROCEDURE — 86900 BLOOD TYPING SEROLOGIC ABO: CPT

## 2024-12-18 ENCOUNTER — APPOINTMENT (OUTPATIENT)
Dept: GENERAL RADIOLOGY | Facility: HOSPITAL | Age: 79
End: 2024-12-18
Attending: STUDENT IN AN ORGANIZED HEALTH CARE EDUCATION/TRAINING PROGRAM
Payer: MEDICARE

## 2024-12-18 ENCOUNTER — HOSPITAL ENCOUNTER (INPATIENT)
Facility: HOSPITAL | Age: 79
LOS: 5 days | Discharge: HOME | End: 2024-12-23
Attending: STUDENT IN AN ORGANIZED HEALTH CARE EDUCATION/TRAINING PROGRAM | Admitting: INTERNAL MEDICINE
Payer: MEDICARE

## 2024-12-18 ENCOUNTER — ANESTHESIA (OUTPATIENT)
Dept: SURGERY | Facility: HOSPITAL | Age: 79
End: 2024-12-18
Payer: MEDICARE

## 2024-12-18 ENCOUNTER — ANESTHESIA EVENT (OUTPATIENT)
Dept: SURGERY | Facility: HOSPITAL | Age: 79
End: 2024-12-18
Payer: MEDICARE

## 2024-12-18 DIAGNOSIS — Z98.1 S/P CERVICAL SPINAL FUSION: Primary | ICD-10-CM

## 2024-12-18 PROBLEM — E78.5 HYPERLIPIDEMIA: Status: ACTIVE | Noted: 2024-12-18

## 2024-12-18 PROBLEM — E03.9 HYPOTHYROIDISM: Status: ACTIVE | Noted: 2024-12-18

## 2024-12-18 PROCEDURE — 22551 ARTHRD ANT NTRBDY CERVICAL: CPT | Performed by: STUDENT IN AN ORGANIZED HEALTH CARE EDUCATION/TRAINING PROGRAM

## 2024-12-18 PROCEDURE — 76000 FLUOROSCOPY <1 HR PHYS/QHP: CPT | Performed by: STUDENT IN AN ORGANIZED HEALTH CARE EDUCATION/TRAINING PROGRAM

## 2024-12-18 PROCEDURE — 22845 INSERT SPINE FIXATION DEVICE: CPT | Performed by: STUDENT IN AN ORGANIZED HEALTH CARE EDUCATION/TRAINING PROGRAM

## 2024-12-18 PROCEDURE — 22853 INSJ BIOMECHANICAL DEVICE: CPT | Performed by: STUDENT IN AN ORGANIZED HEALTH CARE EDUCATION/TRAINING PROGRAM

## 2024-12-18 PROCEDURE — 4A11X4G MONITORING OF PERIPHERAL NERVOUS ELECTRICAL ACTIVITY, INTRAOPERATIVE, EXTERNAL APPROACH: ICD-10-PCS | Performed by: ANESTHESIOLOGY

## 2024-12-18 PROCEDURE — 22552 ARTHRD ANT NTRBD CERVICAL EA: CPT | Performed by: STUDENT IN AN ORGANIZED HEALTH CARE EDUCATION/TRAINING PROGRAM

## 2024-12-18 PROCEDURE — 99223 1ST HOSP IP/OBS HIGH 75: CPT | Performed by: HOSPITALIST

## 2024-12-18 PROCEDURE — 0RB30ZZ EXCISION OF CERVICAL VERTEBRAL DISC, OPEN APPROACH: ICD-10-PCS | Performed by: STUDENT IN AN ORGANIZED HEALTH CARE EDUCATION/TRAINING PROGRAM

## 2024-12-18 PROCEDURE — 0RG20A0 FUSION OF 2 OR MORE CERVICAL VERTEBRAL JOINTS WITH INTERBODY FUSION DEVICE, ANTERIOR APPROACH, ANTERIOR COLUMN, OPEN APPROACH: ICD-10-PCS | Performed by: STUDENT IN AN ORGANIZED HEALTH CARE EDUCATION/TRAINING PROGRAM

## 2024-12-18 DEVICE — SCREW 7714017 ZEVO VAR SD 4.0MM X 17MM
Type: IMPLANTABLE DEVICE | Site: NECK | Status: FUNCTIONAL
Brand: ZEVO™ ANTERIOR CERVICAL PLATE SYSTEM

## 2024-12-18 DEVICE — MAGNETOS EASYPACK PUTTY 2.5CC 1-2MM USA
Type: IMPLANTABLE DEVICE | Site: NECK | Status: FUNCTIONAL
Brand: MAGNETOS

## 2024-12-18 DEVICE — ANATOMIC PEEK W/ NANO 16 X 14 X 8 X 10°
Type: IMPLANTABLE DEVICE | Site: NECK | Status: FUNCTIONAL
Brand: ANATOMIC PEEK™ CERVICAL FUSION SYSTEM WITH NANOTECHNOLOGY

## 2024-12-18 RX ORDER — SODIUM PHOSPHATE, DIBASIC AND SODIUM PHOSPHATE, MONOBASIC 7; 19 G/230ML; G/230ML
1 ENEMA RECTAL ONCE AS NEEDED
Status: DISCONTINUED | OUTPATIENT
Start: 2024-12-18 | End: 2024-12-23

## 2024-12-18 RX ORDER — HYDROMORPHONE HYDROCHLORIDE 1 MG/ML
0.4 INJECTION, SOLUTION INTRAMUSCULAR; INTRAVENOUS; SUBCUTANEOUS EVERY 2 HOUR PRN
Status: DISCONTINUED | OUTPATIENT
Start: 2024-12-18 | End: 2024-12-23

## 2024-12-18 RX ORDER — SODIUM CHLORIDE, SODIUM LACTATE, POTASSIUM CHLORIDE, CALCIUM CHLORIDE 600; 310; 30; 20 MG/100ML; MG/100ML; MG/100ML; MG/100ML
INJECTION, SOLUTION INTRAVENOUS CONTINUOUS
Status: DISCONTINUED | OUTPATIENT
Start: 2024-12-18 | End: 2024-12-18

## 2024-12-18 RX ORDER — HYDROMORPHONE HYDROCHLORIDE 1 MG/ML
0.2 INJECTION, SOLUTION INTRAMUSCULAR; INTRAVENOUS; SUBCUTANEOUS EVERY 2 HOUR PRN
Status: DISCONTINUED | OUTPATIENT
Start: 2024-12-18 | End: 2024-12-23

## 2024-12-18 RX ORDER — SODIUM CHLORIDE, SODIUM LACTATE, POTASSIUM CHLORIDE, CALCIUM CHLORIDE 600; 310; 30; 20 MG/100ML; MG/100ML; MG/100ML; MG/100ML
INJECTION, SOLUTION INTRAVENOUS CONTINUOUS
Status: DISCONTINUED | OUTPATIENT
Start: 2024-12-18 | End: 2024-12-23

## 2024-12-18 RX ORDER — LABETALOL HYDROCHLORIDE 5 MG/ML
5 INJECTION, SOLUTION INTRAVENOUS EVERY 5 MIN PRN
Status: DISPENSED | OUTPATIENT
Start: 2024-12-18 | End: 2024-12-18

## 2024-12-18 RX ORDER — LABETALOL HYDROCHLORIDE 5 MG/ML
INJECTION, SOLUTION INTRAVENOUS AS NEEDED
Status: DISCONTINUED | OUTPATIENT
Start: 2024-12-18 | End: 2024-12-18 | Stop reason: SURG

## 2024-12-18 RX ORDER — OXYCODONE HYDROCHLORIDE 5 MG/1
2.5 TABLET ORAL EVERY 4 HOURS PRN
Status: DISCONTINUED | OUTPATIENT
Start: 2024-12-18 | End: 2024-12-23

## 2024-12-18 RX ORDER — METOCLOPRAMIDE HYDROCHLORIDE 5 MG/ML
10 INJECTION INTRAMUSCULAR; INTRAVENOUS EVERY 8 HOURS PRN
Status: DISCONTINUED | OUTPATIENT
Start: 2024-12-18 | End: 2024-12-23

## 2024-12-18 RX ORDER — ACETAMINOPHEN 10 MG/ML
1000 INJECTION, SOLUTION INTRAVENOUS EVERY 6 HOURS
Status: DISCONTINUED | OUTPATIENT
Start: 2024-12-18 | End: 2024-12-23

## 2024-12-18 RX ORDER — LIDOCAINE HYDROCHLORIDE 10 MG/ML
INJECTION, SOLUTION EPIDURAL; INFILTRATION; INTRACAUDAL; PERINEURAL AS NEEDED
Status: DISCONTINUED | OUTPATIENT
Start: 2024-12-18 | End: 2024-12-18 | Stop reason: SURG

## 2024-12-18 RX ORDER — SENNOSIDES 8.6 MG
17.2 TABLET ORAL NIGHTLY
Status: DISCONTINUED | OUTPATIENT
Start: 2024-12-18 | End: 2024-12-23

## 2024-12-18 RX ORDER — LISINOPRIL 5 MG/1
5 TABLET ORAL DAILY
Status: DISCONTINUED | OUTPATIENT
Start: 2024-12-19 | End: 2024-12-18

## 2024-12-18 RX ORDER — ONDANSETRON 2 MG/ML
4 INJECTION INTRAMUSCULAR; INTRAVENOUS ONCE AS NEEDED
Status: ACTIVE | OUTPATIENT
Start: 2024-12-18 | End: 2024-12-18

## 2024-12-18 RX ORDER — METHOCARBAMOL 500 MG/1
500 TABLET, FILM COATED ORAL EVERY 6 HOURS
Status: DISCONTINUED | OUTPATIENT
Start: 2024-12-18 | End: 2024-12-23

## 2024-12-18 RX ORDER — ROSUVASTATIN CALCIUM 10 MG/1
10 TABLET, COATED ORAL DAILY
Status: DISCONTINUED | OUTPATIENT
Start: 2024-12-19 | End: 2024-12-23

## 2024-12-18 RX ORDER — BISACODYL 10 MG
10 SUPPOSITORY, RECTAL RECTAL
Status: DISCONTINUED | OUTPATIENT
Start: 2024-12-18 | End: 2024-12-23

## 2024-12-18 RX ORDER — PHENYLEPHRINE HCL 10 MG/ML
VIAL (ML) INJECTION AS NEEDED
Status: DISCONTINUED | OUTPATIENT
Start: 2024-12-18 | End: 2024-12-18 | Stop reason: SURG

## 2024-12-18 RX ORDER — ACETAMINOPHEN 500 MG
1000 TABLET ORAL ONCE AS NEEDED
Status: ACTIVE | OUTPATIENT
Start: 2024-12-18 | End: 2024-12-18

## 2024-12-18 RX ORDER — LISINOPRIL 5 MG/1
5 TABLET ORAL DAILY
Status: DISCONTINUED | OUTPATIENT
Start: 2024-12-18 | End: 2024-12-22

## 2024-12-18 RX ORDER — ONDANSETRON 2 MG/ML
4 INJECTION INTRAMUSCULAR; INTRAVENOUS EVERY 6 HOURS PRN
Status: DISCONTINUED | OUTPATIENT
Start: 2024-12-18 | End: 2024-12-23

## 2024-12-18 RX ORDER — DIPHENHYDRAMINE HCL 25 MG
25 CAPSULE ORAL EVERY 4 HOURS PRN
Status: DISCONTINUED | OUTPATIENT
Start: 2024-12-18 | End: 2024-12-23

## 2024-12-18 RX ORDER — HALOPERIDOL 5 MG/ML
0.25 INJECTION INTRAMUSCULAR ONCE AS NEEDED
Status: ACTIVE | OUTPATIENT
Start: 2024-12-18 | End: 2024-12-18

## 2024-12-18 RX ORDER — NALOXONE HYDROCHLORIDE 0.4 MG/ML
0.08 INJECTION, SOLUTION INTRAMUSCULAR; INTRAVENOUS; SUBCUTANEOUS AS NEEDED
Status: DISCONTINUED | OUTPATIENT
Start: 2024-12-18 | End: 2024-12-18

## 2024-12-18 RX ORDER — ONDANSETRON 2 MG/ML
INJECTION INTRAMUSCULAR; INTRAVENOUS AS NEEDED
Status: DISCONTINUED | OUTPATIENT
Start: 2024-12-18 | End: 2024-12-18 | Stop reason: SURG

## 2024-12-18 RX ORDER — DOCUSATE SODIUM 100 MG/1
100 CAPSULE, LIQUID FILLED ORAL 2 TIMES DAILY
Status: DISCONTINUED | OUTPATIENT
Start: 2024-12-18 | End: 2024-12-23

## 2024-12-18 RX ORDER — DIPHENHYDRAMINE HYDROCHLORIDE 50 MG/ML
25 INJECTION INTRAMUSCULAR; INTRAVENOUS EVERY 4 HOURS PRN
Status: DISCONTINUED | OUTPATIENT
Start: 2024-12-18 | End: 2024-12-23

## 2024-12-18 RX ORDER — ENOXAPARIN SODIUM 100 MG/ML
40 INJECTION SUBCUTANEOUS DAILY
Status: DISCONTINUED | OUTPATIENT
Start: 2024-12-19 | End: 2024-12-23

## 2024-12-18 RX ORDER — LEVOTHYROXINE SODIUM 75 UG/1
75 TABLET ORAL
Status: DISCONTINUED | OUTPATIENT
Start: 2024-12-19 | End: 2024-12-23

## 2024-12-18 RX ORDER — SODIUM CHLORIDE, SODIUM LACTATE, POTASSIUM CHLORIDE, CALCIUM CHLORIDE 600; 310; 30; 20 MG/100ML; MG/100ML; MG/100ML; MG/100ML
INJECTION, SOLUTION INTRAVENOUS CONTINUOUS
Status: DISCONTINUED | OUTPATIENT
Start: 2024-12-18 | End: 2024-12-18 | Stop reason: HOSPADM

## 2024-12-18 RX ORDER — DEXAMETHASONE SODIUM PHOSPHATE 4 MG/ML
VIAL (ML) INJECTION AS NEEDED
Status: DISCONTINUED | OUTPATIENT
Start: 2024-12-18 | End: 2024-12-18 | Stop reason: SURG

## 2024-12-18 RX ORDER — GABAPENTIN 300 MG/1
300 CAPSULE ORAL NIGHTLY
Status: DISCONTINUED | OUTPATIENT
Start: 2024-12-18 | End: 2024-12-23

## 2024-12-18 RX ORDER — OXYCODONE HYDROCHLORIDE 5 MG/1
5 TABLET ORAL EVERY 4 HOURS PRN
Status: DISCONTINUED | OUTPATIENT
Start: 2024-12-18 | End: 2024-12-23

## 2024-12-18 RX ORDER — ACETAMINOPHEN 10 MG/ML
1000 INJECTION, SOLUTION INTRAVENOUS ONCE
Status: COMPLETED | OUTPATIENT
Start: 2024-12-18 | End: 2024-12-18

## 2024-12-18 RX ORDER — NALOXONE HYDROCHLORIDE 0.4 MG/ML
0.08 INJECTION, SOLUTION INTRAMUSCULAR; INTRAVENOUS; SUBCUTANEOUS AS NEEDED
Status: DISCONTINUED | OUTPATIENT
Start: 2024-12-18 | End: 2024-12-18 | Stop reason: HOSPADM

## 2024-12-18 RX ORDER — PROCHLORPERAZINE EDISYLATE 5 MG/ML
5 INJECTION INTRAMUSCULAR; INTRAVENOUS ONCE AS NEEDED
Status: DISCONTINUED | OUTPATIENT
Start: 2024-12-18 | End: 2024-12-18

## 2024-12-18 RX ORDER — POLYETHYLENE GLYCOL 3350 17 G/17G
17 POWDER, FOR SOLUTION ORAL DAILY PRN
Status: DISCONTINUED | OUTPATIENT
Start: 2024-12-18 | End: 2024-12-23

## 2024-12-18 RX ORDER — ACETAMINOPHEN 500 MG
1000 TABLET ORAL ONCE
Status: DISCONTINUED | OUTPATIENT
Start: 2024-12-18 | End: 2024-12-18 | Stop reason: HOSPADM

## 2024-12-18 RX ORDER — HYDRALAZINE HYDROCHLORIDE 20 MG/ML
10 INJECTION INTRAMUSCULAR; INTRAVENOUS EVERY 4 HOURS PRN
Status: DISCONTINUED | OUTPATIENT
Start: 2024-12-18 | End: 2024-12-23

## 2024-12-18 RX ORDER — SCOLOPAMINE TRANSDERMAL SYSTEM 1 MG/1
1 PATCH, EXTENDED RELEASE TRANSDERMAL AS NEEDED
Status: DISCONTINUED | OUTPATIENT
Start: 2024-12-18 | End: 2024-12-23

## 2024-12-18 RX ORDER — SODIUM CHLORIDE 9 MG/ML
INJECTION, SOLUTION INTRAVENOUS CONTINUOUS PRN
Status: DISCONTINUED | OUTPATIENT
Start: 2024-12-18 | End: 2024-12-18 | Stop reason: SURG

## 2024-12-18 RX ADMIN — ONDANSETRON 4 MG: 2 INJECTION INTRAMUSCULAR; INTRAVENOUS at 13:06:00

## 2024-12-18 RX ADMIN — SODIUM CHLORIDE: 9 INJECTION, SOLUTION INTRAVENOUS at 10:02:00

## 2024-12-18 RX ADMIN — SODIUM CHLORIDE: 9 INJECTION, SOLUTION INTRAVENOUS at 11:51:00

## 2024-12-18 RX ADMIN — LABETALOL HYDROCHLORIDE 10 MG: 5 INJECTION, SOLUTION INTRAVENOUS at 13:23:00

## 2024-12-18 RX ADMIN — PHENYLEPHRINE HCL 100 MCG: 10 MG/ML VIAL (ML) INJECTION at 09:59:00

## 2024-12-18 RX ADMIN — SODIUM CHLORIDE, SODIUM LACTATE, POTASSIUM CHLORIDE, CALCIUM CHLORIDE: 600; 310; 30; 20 INJECTION, SOLUTION INTRAVENOUS at 13:49:00

## 2024-12-18 RX ADMIN — LIDOCAINE HYDROCHLORIDE 30 MG: 10 INJECTION, SOLUTION EPIDURAL; INFILTRATION; INTRACAUDAL; PERINEURAL at 09:45:00

## 2024-12-18 RX ADMIN — PHENYLEPHRINE HCL 100 MCG: 10 MG/ML VIAL (ML) INJECTION at 11:28:00

## 2024-12-18 RX ADMIN — DEXAMETHASONE SODIUM PHOSPHATE 8 MG: 4 MG/ML VIAL (ML) INJECTION at 09:45:00

## 2024-12-18 NOTE — ANESTHESIA PREPROCEDURE EVALUATION
Anesthesia PreOp Note    HPI:     Alejandra Green is a 79 year old female who presents for preoperative consultation requested by: Gerald Alves MD    Date of Surgery: 12/18/2024    Procedure(s):  Transcervical exposure of prior Cervical 4 to 5 fusion, anterior Cervical 3 to 4 and 5 to 6 discectomies and fusion approaching from the left  Indication: Neck pain [M54.2]  Occipital pain [R51.9]  Right hand weakness [R29.898]  Imbalance [R26.89]  History of fusion of cervical spine [Z98.1]  Cervical stenosis of spine [M48.02]  Degenerative cervical disc [M50.30]    Relevant Problems   No relevant active problems       NPO:   Last solid yesterday, finished Gatorade at 7 AM                      History Review:  Patient Active Problem List    Diagnosis Date Noted    Occipital neuralgia of right side 10/02/2024    right C6-7 radiculopathy 03/20/2024    Arthropathy of cervical facet joint: right mod-severe C2-3, C3-4 right mod 01/16/2024    Neck pain on right side 01/16/2024    Cervicogenic headache 01/16/2024    C5-6 mod-severe, C6-7 mild-mod central stenosis 01/16/2024    C3-4 mild diffuse, C5-6 mod-large diffuse, C6-7 mod diffuse bulging discs 01/16/2024    Cervical fusion syndrome: C4-5 ACDF 01/16/2024    Essential hypertension     Sensorineural hearing loss, bilateral 10/26/2016       Past Medical History:    Back problem    Disorder of thyroid    Essential hypertension    High blood pressure    High cholesterol    Osteoarthritis    PONV (postoperative nausea and vomiting)    Visual impairment       Past Surgical History:   Procedure Laterality Date    Back surgery      Cholecystectomy      Cystoscopy,insert ureteral stent      Hernia surgery      Hysterectomy      Knee replacement surgery Bilateral     X2    Spine surgery procedure unlisted      Trigger finger release N/A     X4       Prescriptions Prior to Admission[1]  Current Medications and Prescriptions Ordered in Epic[2]    Allergies[3]    Family  History   Problem Relation Age of Onset    Heart Disease Father     Cancer Mother     Heart Attack Sister      Social History     Socioeconomic History    Marital status:    Tobacco Use    Smoking status: Never    Smokeless tobacco: Never   Vaping Use    Vaping status: Never Used   Substance and Sexual Activity    Alcohol use: No    Drug use: No   Other Topics Concern    Caffeine Concern No    Exercise Yes       Available pre-op labs reviewed.  Lab Results   Component Value Date    WBC 9.9 12/13/2024    RBC 5.07 12/13/2024    HGB 14.3 12/13/2024    HCT 44.5 12/13/2024    MCV 87.8 12/13/2024    MCH 28.2 12/13/2024    MCHC 32.1 12/13/2024    RDW 13.0 12/13/2024    .0 12/13/2024     Lab Results   Component Value Date     11/11/2024    K 4.2 11/11/2024     11/11/2024    CO2 30.0 11/11/2024    BUN 13 11/11/2024    CREATSERUM 0.64 11/11/2024    GLU 86 11/11/2024    CA 9.9 11/11/2024          Vital Signs:  Body mass index is 25.06 kg/m².   height is 1.702 m (5' 7\") and weight is 72.6 kg (160 lb).   Vitals:    12/04/24 1154 12/12/24 1201   Weight: 73.5 kg (162 lb) 72.6 kg (160 lb)   Height: 1.702 m (5' 7\") 1.702 m (5' 7\")        Anesthesia Evaluation     Patient summary reviewed and Nursing notes reviewed    History of anesthetic complications   Airway   Mallampati: II  TM distance: >3 FB  Neck ROM: full  Dental - Dentition appears grossly intact     Pulmonary - normal exam     ROS comment: Hx PONV  Had a cold thanksgiving with cough, runny nose. Complete z-pack on 12/15. Feels improved- now dry cough only. Denies any fevers.       Cardiovascular - normal exam  Exercise tolerance: good (Can walk several miles)  (+) hypertension, CAD, dysrhythmias    ECG reviewed  Rhythm: regular  Rate: normal  ROS comment: Cleared by Cardiologist- per his note 11/8/2024:  01. GRIFFITH (dyspnea on exertion):   Lifestyle interfering GRIFFITH on stairs or even flat ground on a background of previous excellent exercise  tolerance, CAD, and normal echo '20. Concerning for anginal equivalent.   Further evaluation was advised. Options including empiric medical management for presumed CAD, coronary CT angiography to delineate anatomy and guide therapy, and cardiac catheterization for definitive diagnosis were discussed. Risks and benefits of each approach were reviewed in detail.  Coronary CTA 1/23 negative for significant obstructive CAD.    02. Palpitations:   Symptoms consistent with arrhythmia rather than ischemia or CHF. Cardiac exam notable for occasional PVBs; ECG unchanged.   Holter notable for 3% PACS and occasional PVCs, with arrhythmia during symptoms. She was intolerant of metoprolol trial.    Stress echo/doppler '20 was unremarkable. She was intolerant of BB. They have resolved almost completely spontaneously.    03. PAC (premature atrial contraction): '  Holter (Sinus Rhythm, 47-110BPM, avg 68BPM, 58 PVS w/o couplets or VT, 3239 PACs)  No further symptoms.    04. CAD in native artery:   Abnormal coronary calcium score [Total 12: LMCA 0, LAD 12, LCX 0, RCA 0, PDA 0 (1/14)]. There is no angina or ischemia '20.   Cardiac CTA: Total calcium score 92 (Prox LAD: 26-49%. Major diagonal 26-49%. Prox Cx 1-25%. Possible stenosis of bifurcating RI could exceed 50% but may be artifact. Limited d/t inadequate HR control.) - 1/26/2023  She was advised to take daily ASA.    05. Abnormal ECG:   The findings are suggestive of prior anterior myocardial infarction. There is no evidence of prior infarction by echo. The ECG is unchanged from previous.    06. Essential hypertension:   Well controlled on medical therapy. Managed by: PCP.    07. Hypercholesteremia:   On chronic statin therapy per PCP.  Lipids 2/23/24:  HDL 48 LDL 45     08. Preoperative cardiovascular examination:  Cervical surgery - She has had no recent angina. She has had no recent CHF manifestations. There has been no recent arrhythmias. There are no cardiac  contraindications to the planned procedure. No further cardiac evaluation is indicated at this time.    EKG same day:  Rate: 75 P: 39  UT: 187 QRS: -11  QRSD: 85 T: 29  QT: 368   QTc: 412   Interpretive Statements  SINUS RHYTHM  POSSIBLE LEFT ATRIAL ENLARGEMENT  LOW QRS VOLTAGE IN PRECORDIAL LEADS  POSSIBLE ANTERIOR MYOCARDIAL INFARCTION, OF INDETERMINATE AGE  INFERIOR MYOCARDIAL INFARCTION, PROBABLY OLD  No previous ECG available for comparison  Electronically Signed On 11-8-2024 15:18:35 CST by José Avendaño       Per patient her BP runs 120s/80s      Neuro/Psych    (+)  neuromuscular disease,        Comments: Cervical spinal compression    GI/Hepatic/Renal - negative ROS     Endo/Other - negative ROS   Abdominal  - normal exam    Abdomen: soft.                 Anesthesia Plan:   ASA:  3  Plan:   General  Monitors and Lines:   A-line, Additonal IV and BIS  Airway:  Video laryngoscope  Patient placed own scopolamine patch behind right ear at home this AM as part of PONV prophylaxis.    I have informed Alejandra Green and/or legal guardian or family member of the nature of the anesthetic plan, benefits, risks including possible dental damage if relevant, major complications, and any alternative forms of anesthetic management.   All of the patient's questions were answered to the best of my ability. The patient desires the anesthetic management as planned.  Joan Mak MD  12/18/2024 7:39 AM  Present on Admission:  **None**           [1]   No medications prior to admission.   [2]   No current Epic-ordered facility-administered medications on file.     Current Outpatient Medications Ordered in Epic   Medication Sig Dispense Refill    azithromycin (ZITHROMAX Z-LIBBY) 250 MG Oral Tab Take 1 tablet (250 mg total) by mouth daily.      benzonatate 100 MG Oral Cap Take 1 capsule (100 mg total) by mouth 3 (three) times daily as needed for cough. >10 years old 30 capsule 0    aspirin 81 MG Oral Tab EC Take 1  tablet (81 mg total) by mouth daily.      acetaminophen (TYLENOL EXTRA STRENGTH) 500 MG Oral Tab 1 tablet as needed Orally every 6 hrs      denosumab (PROLIA) 60 MG/ML Subcutaneous Solution Prefilled Syringe Subcutaneous      ergocalciferol 1.25 MG (94615 UT) Oral Cap Take 1.25 mg by mouth once a week. Saturdays      Fluticasone Furoate (FLONASE SENSIMIST) 27.5 MCG/SPRAY Nasal Suspension 1 spray in each nostril Nasally Once a day      Loperamide HCl (IMODIUM A-D) 2 MG Oral Tab Take 1 tablet (2 mg total) by mouth as needed for Diarrhea.      loratadine 10 MG Oral Tab Take 1 tablet (10 mg total) by mouth daily.      Scopolamine 1.5mg TD patch 1mg/3days 1 patch to skin behind the ear as needed Transdermal once every 3 days for 30 days      rosuvastatin 10 MG Oral Tab Take 1 tablet (10 mg total) by mouth daily.      Multiple Vitamin (MULTI-DAY) Oral Tab Take by mouth.      cholecalciferol (VITAMIN D3) 10 MCG (400 UNIT) Oral Tab Take 1 tablet (400 Units total) by mouth daily.      Calcium Carbonate 600 MG Oral Tab Take 1 tablet (600 mg total) by mouth.      Benazepril HCl 5 MG Oral Tab Take 1 tablet (5 mg total) by mouth daily.      Levothyroxine Sodium 75 MCG Oral Tab Take 1 tablet (75 mcg total) by mouth before breakfast.      gabapentin 300 MG Oral Cap Take 1 capsule (300 mg total) by mouth nightly.      Bacitracin-Polymyxin B 500-28505 UNIT/GM External Ointment daily.     [3]   Allergies  Allergen Reactions    Duloxetine Hcl NAUSEA AND VOMITING and OTHER (SEE COMMENTS)    Hydrocodone NAUSEA AND VOMITING    Hydromorphone UNKNOWN and OTHER (SEE COMMENTS)     Unable to recall reactions    Seasonal OTHER (SEE COMMENTS)     RUNNY NOSE    Tramadol UNKNOWN     Pt unable to recall reactions    Adhesive Tape RASH    Biaxin [Clarithromycin] DIARRHEA    Ciprofloxacin NAUSEA ONLY    Wound Dressing Adhesive RASH

## 2024-12-18 NOTE — BRIEF OP NOTE
Pre-Operative Diagnosis: Neck pain [M54.2]  Occipital pain [R51.9]  Right hand weakness [R29.898]  Imbalance [R26.89]  History of fusion of cervical spine [Z98.1]  Cervical stenosis of spine [M48.02]  Degenerative cervical disc [M50.30]     Post-Operative Diagnosis: Neck pain [M54.2]Occipital pain [R51.9]Right hand weakness [R29.898]Imbalance [R26.89]History of fusion of cervical spine [Z98.1]Cervical stenosis of spine [M48.02]Degenerative cervical disc [M50.30]      Procedure Performed:   Transcervical exposure of prior C4-5 fusion, exploration of fusion mass, C3-4 and C5-6 ACDFs using 2 separate anterior fixation plates.    Surgeons and Role:     * Gerald Alves MD - Primary    Assistant(s):  PA: Derek Robin PA-C     Surgical Findings: As expected preop.      Specimen: None     Estimated Blood Loss: Blood Output: 25 mL (12/18/2024  1:13 PM)    Gerald Alves MD  Neurological Surgery    [facility] [address], [address] [place] [address] [id number] [phone]  Pager [phone]  12/18/2024 1:40 PM      This note was created using a voice-recognition transcribing system. Incorrect words or phrases may have been missed during proofreading. Please interpret accordingly.

## 2024-12-18 NOTE — CONSULTS
[facility]    PATIENT'S NAME: STACY HUDDLESTON   ATTENDING PHYSICIAN: Gerald Doe MD   CONSULTING PHYSICIAN: Melinda Min MD   PATIENT ACCOUNT#:   [id number]    LOCATION:  [facility] PACU 2 [facility] 10  MEDICAL RECORD #:   [id number]       YOB: 1945  ADMISSION DATE:       12/18/2024      CONSULT DATE:  12/18/2024    REPORT OF CONSULTATION    REASON FOR ADMISSION:  Post anterior cervical discectomy and fusion.    HISTORY OF PRESENT ILLNESS:  The patient is a 79-year-old  female with chronic neck pain and radiculopathy.  MRI scan of the cervical spine showed multilevel cervical spinal stenosis and spondylosis, most pronounced at C5-C6.  She had history of C4-C5 anterior cervical discectomy and fusion.  Failed outpatient conservative medical management options, scheduled today for above-mentioned procedure by her neurosurgeon, Dr. Gerald Doe.  Postoperatively transferred to PACU for further monitoring.    PAST MEDICAL HISTORY:  Degenerative joint disease of cervical and lumbar spine, hypothyroidism, hypertension, hyperlipidemia, kidney stones, overactive bladder, osteoarthritis, and osteoporosis.    PAST SURGICAL HISTORY:  L4-L5 posterior spinal fusion, C4-C5 anterior cervical discectomy and fusion, cholecystectomy, cystoscopy and ureteral stent, hysterectomy, and bilateral total knee arthroplasty.    MEDICATIONS:  Please see medication reconciliation list.     ALLERGIES:  Adhesive tape.  Multiple medications side effects.    SOCIAL HISTORY:  No tobacco, alcohol, or drug use.  Lives with her family.  Independent for basic activities of daily living.    FAMILY HISTORY:  Father had heart disease.  Mother had cancer.    REVIEW OF SYSTEMS:  Currently resting in bed.  Neck discomfort.  No upper extremity tingling or numbness.  Other 12-point review of systems negative.      PHYSICAL EXAMINATION:  GENERAL:  Alert, oriented to time, place, and person; drowsy, but  no acute distress.  VITAL SIGNS:  Temperature 97.5, pulse 79, respiratory rate 17, blood pressure 158/84, pulse ox 95% on room air.  HEENT:  Atraumatic.  Oropharynx clear.  Moist mucous membranes.  Ears, nose normal.  Eyes:  Anicteric sclerae.  NECK:  Anterior dressing with Hemovac surgical drain.  Surgical wound without complications.  LUNGS:  Clear to auscultation bilaterally.  Normal respiratory effort.  HEART:  Regular rate and rhythm.  S1 and S2 auscultated.  No murmur.  ABDOMEN:  Soft, nondistended.  No tenderness.  Positive bowel sounds.  EXTREMITIES:  No peripheral edema, clubbing, or cyanosis.   NEUROLOGIC:  Motor and sensory intact.    ASSESSMENT AND PLAN:  1.   Cervical spinal stenosis status post transcervical exposure of prior C4-C5 fusion, exploration of fusion mass, C3-C4 and C5-C6 anterior cervical discectomy and fusion using 2 separate anterior fixation plates.  Pain control.  Neuro checks.  Monitor surgical wound and drain.  DVT prophylaxis.  Physical and occupational therapy when stable.  2.   Essential hypertension.  Continue home medications and monitor.  3.   Hyperlipidemia.  Continue home medications.  4.   Hypothyroidism.  Continue home medications.    Dictated By Melinda Min MD  d: 12/18/2024 13:59:02  t: 12/18/2024 14:10:45  Job [id number]  FB/    cc: Gerald Doe MD

## 2024-12-18 NOTE — INTERVAL H&P NOTE
Pre-op Diagnosis: Neck pain [M54.2]  Occipital pain [R51.9]  Right hand weakness [R29.898]  Imbalance [R26.89]  History of fusion of cervical spine [Z98.1]  Cervical stenosis of spine [M48.02]  Degenerative cervical disc [M50.30]    The above referenced H&P was reviewed by Gerald Alves MD on 12/18/2024, the patient was examined and no significant changes have occurred in the patient's condition since the H&P was performed.  I discussed the proposed operation with Ms.?Jane Tommy Green in detail, including the risks, benefits, and alternatives to surgery, using language she could understand. The risks discussed included, but were not limited to, neurologic injury, such as spinal cord or nerve root injury, which may lead to temporary or permanent deficits such as weakness, numbness, or paralysis; C5 nerve palsy, which could be temporary or permanent; dysphagia, which may be temporary or permanent; recurrent laryngeal nerve injury, potentially resulting in temporary or permanent hoarseness; infection, ranging from superficial wound infection to deep infections such as cervical vertebral osteomyelitis or mediastinitis; intraoperative bleeding from blood vessel injury, which could lead to stroke, hematoma, airway compromise, or death from exsanguination; postoperative hemorrhage, which could result in neurologic compromise or airway obstruction; dural tear, which may lead to pseudomeningocele or cerebrospinal fluid (CSF) fistula despite intraoperative repair; hardware malposition, failure, or migration, which could require corrective surgery; non-union or pseudoarthrosis, potentially necessitating reoperation; adjacent segment disease, which could result in additional spinal degeneration or require further intervention; substantial blood loss, which could necessitate transfusion; injury to adjacent structures, including the esophagus or trachea; and complications related to anesthesia, including acute  myocardial infarction, venous thromboembolism, pneumonia, urinary tract infection, or death.    Non-surgical alternatives and the potential risks and benefits of foregoing surgical intervention were also reviewed. Ms.?Jane Tommy Green demonstrated understanding of the information provided, asked appropriate questions, and confirmed that all questions were answered to satisfaction. She has voluntarily elected to proceed with surgery, which is scheduled for today, 12/18/2024. Written informed consent has been obtained and documented.    Gerald Alves MD  Neurological Surgery    [address], [address]  [place] [address]  [phone]  Pager [phone]  12/18/2024 9:31 AM      This note was created using a voice-recognition transcribing system. Incorrect words or phrases may have been missed during proofreading. Please interpret accordingly.

## 2024-12-18 NOTE — H&P
NEUROSURGERY HISTORY & PHYSICAL EXAMINATION    Patient Name: Alejandra Green  MRN: [id number]  CSN: [id number]  YOB: 1945    Diagnosis: cervical polyradiculopathies    History of Present Illness:   Alejandra Green is a very pleasant 79 year old female who presents today for planned Procedure(s):  Transcervical exposure of prior Cervical 4 to 5 fusion, anterior Cervical 3 to 4 and 5 to 6 discectomies and fusion approaching from the left with Dr. Alves. Denies any new complaints since the last neurosurgery office visit.    Patient has been NPO.  Does not take anticoagulants.  Labs from 11/11, 12/13 reviewed and are within acceptable limits to proceed with surgery.  Medical and cardiology clearance in media tab    Previous encounters reviewed during chart review.    Past Medical History:    Back problem    Disorder of thyroid    Essential hypertension    High blood pressure    High cholesterol    Osteoarthritis    PONV (postoperative nausea and vomiting)    Visual impairment      Past Surgical History:   Procedure Laterality Date    Back surgery      Cholecystectomy      Cystoscopy,insert ureteral stent      Hernia surgery      Hysterectomy      Knee replacement surgery Bilateral     X2    Spine surgery procedure unlisted      Trigger finger release N/A     X4      Family History   Problem Relation Age of Onset    Heart Disease Father     Cancer Mother     Heart Attack Sister       Social History     Socioeconomic History    Marital status:    Tobacco Use    Smoking status: Never    Smokeless tobacco: Never   Vaping Use    Vaping status: Never Used   Substance and Sexual Activity    Alcohol use: No    Drug use: No   Other Topics Concern    Caffeine Concern No    Exercise Yes      Allergies[1]   Current Medications:  No current outpatient medications on file.        Current Facility-Administered Medications   Medication Dose Route Frequency    lactated ringers infusion    Intravenous Continuous    acetaminophen (Tylenol Extra Strength) tab 1,000 mg  1,000 mg Oral Once    ceFAZolin (Ancef) 2g in 10mL IV syringe premix  2 g Intravenous Once       Neurological Exam:  VITALS: /84 (BP Location: Right arm)   Pulse 79   Temp 97.5 °F (36.4 °C) (Oral)   Resp 17   Ht 67\"   Wt 160 lb (72.6 kg)   SpO2 95%   BMI 25.06 kg/m²  Temp (24hrs), Av.5 °F (36.4 °C), Min:97.5 °F (36.4 °C), Max:97.5 °F (36.4 °C)       General: Well developed, well nourished, in no acute distress.    Neurological / Musculoskeletal: Alert and oriented. Attention span and concentration are appropriate. Coordination and motor control grossly intact. Patient follows commands briskly and appropriately.     Cervical Spine:    Motor/ Extremities   Deltoid Biceps Triceps    R 5/5 5/5 5/5 4+/5   L 5/5 5/5 5/5 5/5       Lumbar Spine:    Motor / Extremities   Hip   flexion Knee   extension Dorsiflexion Plantarflexion   R 5/5 5/5 5/5 5/5   L 5/5 5/5 5/5 5/5     Patient and family have no further questions in regard to surgical intervention. Dr. Alves reviewed with the patient and/or legal representative the potential benefits, risks and side effects of this procedure; the likelihood of the patient achieving goals; and potential problems that might occur during recuperation. Dr. Alves reviewed reasonable alternatives to the procedure, including risks, benefits and side effects related to the alternatives and risks related to not receiving this procedure. We will proceed with procedure as planned. Patient signed the consent form. Marked for surgery.     Derek Robin PA-C  Physician Assistant- Neurosurgery   [facility]  2024, 9:17 AM             [1]   Allergies  Allergen Reactions    Duloxetine Hcl NAUSEA AND VOMITING and OTHER (SEE COMMENTS)    Hydrocodone NAUSEA AND VOMITING    Hydromorphone UNKNOWN and OTHER (SEE COMMENTS)     Unable to recall reactions    Seasonal OTHER (SEE COMMENTS)      RUNNY NOSE    Tramadol UNKNOWN     Pt unable to recall reactions    Adhesive Tape RASH    Biaxin [Clarithromycin] DIARRHEA    Ciprofloxacin NAUSEA ONLY    Wound Dressing Adhesive RASH

## 2024-12-18 NOTE — ANESTHESIA PROCEDURE NOTES
Peripheral IV  Date/Time: 12/18/2024 10:35 AM  Inserted by: Joan Mak MD    Placement  Needle size: 16 G  Laterality: right  Location: hand  Local anesthetic: none  Site prep: alcohol  Technique: anatomical landmarks  Attempts: 1

## 2024-12-18 NOTE — PLAN OF CARE
Post-op day #0. Dressing in place to Anterior Neck, Rison collar in place. Hemovac patent to suction. Monitoring vital signs- stable at this time. No acute changes noted throughout shift. Receiving IV fluids per MD order. Tolerating diet. Voiding by lozada. Patient is on 2L/min of oxygen via nasal cannula. SCDs and Lovenox for DVT prophylaxis. Pain medication provided as needed. Encouraged frequent ambulation/position changes and use of incentive spirometer. Fall precautions maintained- bed alarm on, bed locked in lowest position, call light and personal belongings within reach, non-skid socks in place to bilateral feet. Frequent rounding by nursing staff. Plan to discharge home once medically cleared.     Problem: Patient Centered Care  Goal: Patient preferences are identified and integrated in the patient's plan of care  Description: Interventions:  - What would you like us to know as we care for you?   - Provide timely, complete, and accurate information to patient/family  - Incorporate patient and family knowledge, values, beliefs, and cultural backgrounds into the planning and delivery of care  - Encourage patient/family to participate in care and decision-making at the level they choose  - Honor patient and family perspectives and choices  Outcome: Progressing     Problem: Patient/Family Goals  Goal: Patient/Family Long Term Goal  Description: Patient's Long Term Goal:     Interventions  - See additional Care Plan goals for specific interventions  Outcome: Progressing  Goal: Patient/Family Short Term Goal  Description: Patient's Short Term Goal:     Interventions  - See additional Care Plan goals for specific interventions  Outcome: Progressing     Problem: PAIN - ADULT  Goal: Verbalizes/displays adequate comfort level or patient's stated pain goal  Description: INTERVENTIONS:  - Encourage pt to monitor pain and request assistance  - Assess pain using appropriate pain scale  - Administer analgesics based on  type and severity of pain and evaluate response  - Implement non-pharmacological measures as appropriate and evaluate response  - Consider cultural and social influences on pain and pain management  - Manage/alleviate anxiety  - Utilize distraction and/or relaxation techniques  - Monitor for opioid side effects  - Notify MD/LIP if interventions unsuccessful or patient reports new pain  - Anticipate increased pain with activity and pre-medicate as appropriate  Outcome: Progressing     Problem: RISK FOR INFECTION - ADULT  Goal: Absence of fever/infection during anticipated neutropenic period  Description: INTERVENTIONS  - Monitor WBC  - Administer growth factors as ordered  - Implement neutropenic guidelines  Outcome: Progressing     Problem: SAFETY ADULT - FALL  Goal: Free from fall injury  Description: INTERVENTIONS:  - Assess pt frequently for physical needs  - Identify cognitive and physical deficits and behaviors that affect risk of falls.  - [place] fall precautions as indicated by assessment.  - Educate pt/family on patient safety including physical limitations  - Instruct pt to call for assistance with activity based on assessment  - Modify environment to reduce risk of injury  - Provide assistive devices as appropriate  - Consider OT/PT consult to assist with strengthening/mobility  - Encourage toileting schedule  Outcome: Progressing     Problem: DISCHARGE PLANNING  Goal: Discharge to home or other facility with appropriate resources  Description: INTERVENTIONS:  - Identify barriers to discharge w/pt and caregiver  - Include patient/family/discharge partner in discharge planning  - Arrange for needed discharge resources and transportation as appropriate  - Identify discharge learning needs (meds, wound care, etc)  - Arrange for interpreters to assist at discharge as needed  - Consider post-discharge preferences of patient/family/discharge partner  - Complete POLST form as appropriate  - Assess patient's  ability to be responsible for managing their own health  - Refer to Case Management Department for coordinating discharge planning if the patient needs post-hospital services based on physician/LIP order or complex needs related to functional status, cognitive ability or social support system  Outcome: Progressing

## 2024-12-18 NOTE — ANESTHESIA PROCEDURE NOTES
Airway  Date/Time: 12/18/2024 9:46 AM  Urgency: Elective      General Information and Staff    Patient location during procedure: OR  Anesthesiologist: Joan Mak MD  Performed: anesthesiologist   Performed by: Joan Mak MD  Authorized by: Joan Mak MD      Indications and Patient Condition  Indications for airway management: anesthesia  Spontaneous Ventilation: absent  Sedation level: deep  Preoxygenated: yes  MILS maintained throughout  Mask difficulty assessment: 0 - not attempted    Final Airway Details  Final airway type: endotracheal airway      Successful airway: ETT  Cuffed: yes   Successful intubation technique: Video laryngoscopy  Endotracheal tube insertion site: oral  Blade: GlideScope  Blade size: #3  ETT size (mm): 7.0    Cormack-Lehane Classification: grade I - full view of glottis  Placement verified by: capnometry   Measured from: teeth  ETT to teeth (cm): 21  Number of attempts at approach: 1  Number of other approaches attempted: 0

## 2024-12-18 NOTE — ANESTHESIA POSTPROCEDURE EVALUATION
Patient: Alejandra Green    Procedure Summary       Date: 12/18/24 Room / Location: [facility] MAIN OR  / [facility] MAIN OR    Anesthesia Start: 0939 Anesthesia Stop: 1349    Procedure: Transcervical exposure of prior Cervical 4 to 5 fusion, anterior Cervical 3 to 4 and 5 to 6 discectomies and fusion approaching from the left (Left: Spine Cervical) Diagnosis:       Neck pain      Occipital pain      Right hand weakness      Imbalance      History of fusion of cervical spine      Cervical stenosis of spine      Degenerative cervical disc      (Neck pain [M54.2]Occipital pain [R51.9]Right hand weakness [R29.898]Imbalance [R26.89]History of fusion of cervical spine [Z98.1]Cervical stenosis of spine [M48.02]Degenerative cervical disc [M50.30])    Surgeons: Gerald Alves MD Anesthesiologist: Joan Mak MD    Anesthesia Type: general ASA Status: 3            Anesthesia Type: general    Vitals Value Taken Time   /94 12/18/24 1344   Temp 98.1 12/18/24 1349   Pulse 79 12/18/24 1348   Resp 19 12/18/24 1348   SpO2 97 % 12/18/24 1348   Vitals shown include unfiled device data.    [facility] AN Post Evaluation:   Patient Evaluated in PACU  Patient Participation: complete - patient participated  Level of Consciousness: awake  Pain Score: 2  Pain Management: adequate  Airway Patency:patent  Yes    Nausea/Vomiting: none  Cardiovascular Status: acceptable  Respiratory Status: acceptable  Postoperative Hydration acceptable      Joan Mak MD  12/18/2024 1:49 PM
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
## 2024-12-18 NOTE — ANESTHESIA PROCEDURE NOTES
Arterial Line    Date/Time: 12/18/2024 9:52 AM    Performed by: Joan Mak MD  Authorized by: Joan Mak MD    General Information and Staff    Procedure Start:  12/18/2024 9:52 AM  Procedure End:  12/18/2024 9:53 AM  Anesthesiologist:  Joan Mak MD  Performed By:  Anesthesiologist  Patient Location:  OR  Indication: continuous blood pressure monitoring and blood sampling needed    Site Identification: surface landmarks    Preanesthetic Checklist: 2 patient identifiers, IV checked, risks and benefits discussed, monitors and equipment checked, pre-op evaluation, timeout performed, anesthesia consent and sterile technique used    Procedure Details    Catheter Size:  20 G  Catheter Length:  1 and 3/4 inch  Catheter Type:  Arrow  Seldinger Technique?: Yes    Laterality:  Left  Site:  Radial artery  Site Prep: chlorhexidine    Line Secured:  Tegaderm and Wrist Brace    Assessment    Events: patient tolerated procedure well with no complications      Medications  12/18/2024 9:52 AM      Additional Comments

## 2024-12-19 ENCOUNTER — APPOINTMENT (OUTPATIENT)
Dept: GENERAL RADIOLOGY | Facility: HOSPITAL | Age: 79
End: 2024-12-19
Attending: HOSPITALIST
Payer: MEDICARE

## 2024-12-19 ENCOUNTER — APPOINTMENT (OUTPATIENT)
Dept: GENERAL RADIOLOGY | Facility: HOSPITAL | Age: 79
End: 2024-12-19
Attending: STUDENT IN AN ORGANIZED HEALTH CARE EDUCATION/TRAINING PROGRAM
Payer: MEDICARE

## 2024-12-19 PROCEDURE — 70360 X-RAY EXAM OF NECK: CPT | Performed by: HOSPITALIST

## 2024-12-19 PROCEDURE — 99233 SBSQ HOSP IP/OBS HIGH 50: CPT | Performed by: HOSPITALIST

## 2024-12-19 PROCEDURE — 72040 X-RAY EXAM NECK SPINE 2-3 VW: CPT | Performed by: STUDENT IN AN ORGANIZED HEALTH CARE EDUCATION/TRAINING PROGRAM

## 2024-12-19 RX ORDER — METHOCARBAMOL 500 MG/1
500 TABLET, FILM COATED ORAL 3 TIMES DAILY PRN
Qty: 60 TABLET | Refills: 0 | Status: SHIPPED | OUTPATIENT
Start: 2024-12-19

## 2024-12-19 RX ORDER — OXYCODONE HYDROCHLORIDE 5 MG/1
5 TABLET ORAL EVERY 4 HOURS PRN
Qty: 30 TABLET | Refills: 0 | Status: SHIPPED | OUTPATIENT
Start: 2024-12-19 | End: 2024-12-28

## 2024-12-19 RX ORDER — ACETAMINOPHEN 500 MG
500 TABLET ORAL EVERY 4 HOURS PRN
Qty: 120 TABLET | Refills: 0 | Status: SHIPPED | OUTPATIENT
Start: 2024-12-19

## 2024-12-19 RX ORDER — DEXAMETHASONE 4 MG/1
4 TABLET ORAL EVERY 6 HOURS SCHEDULED
Status: DISCONTINUED | OUTPATIENT
Start: 2024-12-19 | End: 2024-12-20

## 2024-12-19 NOTE — PLAN OF CARE
Patient is A/Ox4. On RA. Tolerating diet. Voiding freely. Up 1x. POD 1. PRN oxy given for pain. PRN reglan given for nausea. Aspen collar in place. SCDs and lovenox for DVT prophylaxis. Call light and personal items within reach. Spouse at bedside. Plan for discharge once medically cleared.      Problem: PAIN - ADULT  Goal: Verbalizes/displays adequate comfort level or patient's stated pain goal  Description: INTERVENTIONS:  - Encourage pt to monitor pain and request assistance  - Assess pain using appropriate pain scale  - Administer analgesics based on type and severity of pain and evaluate response  - Implement non-pharmacological measures as appropriate and evaluate response  - Consider cultural and social influences on pain and pain management  - Manage/alleviate anxiety  - Utilize distraction and/or relaxation techniques  - Monitor for opioid side effects  - Notify MD/LIP if interventions unsuccessful or patient reports new pain  - Anticipate increased pain with activity and pre-medicate as appropriate  Outcome: Progressing     Problem: RISK FOR INFECTION - ADULT  Goal: Absence of fever/infection during anticipated neutropenic period  Description: INTERVENTIONS  - Monitor WBC  - Administer growth factors as ordered  - Implement neutropenic guidelines  Outcome: Progressing     Problem: SAFETY ADULT - FALL  Goal: Free from fall injury  Description: INTERVENTIONS:  - Assess pt frequently for physical needs  - Identify cognitive and physical deficits and behaviors that affect risk of falls.  - Posey fall precautions as indicated by assessment.  - Educate pt/family on patient safety including physical limitations  - Instruct pt to call for assistance with activity based on assessment  - Modify environment to reduce risk of injury  - Provide assistive devices as appropriate  - Consider OT/PT consult to assist with strengthening/mobility  - Encourage toileting schedule  Outcome: Progressing     Problem: DISCHARGE  PLANNING  Goal: Discharge to home or other facility with appropriate resources  Description: INTERVENTIONS:  - Identify barriers to discharge w/pt and caregiver  - Include patient/family/discharge partner in discharge planning  - Arrange for needed discharge resources and transportation as appropriate  - Identify discharge learning needs (meds, wound care, etc)  - Arrange for interpreters to assist at discharge as needed  - Consider post-discharge preferences of patient/family/discharge partner  - Complete POLST form as appropriate  - Assess patient's ability to be responsible for managing their own health  - Refer to Case Management Department for coordinating discharge planning if the patient needs post-hospital services based on physician/LIP order or complex needs related to functional status, cognitive ability or social support system  Outcome: Progressing

## 2024-12-19 NOTE — CM/SW NOTE
SW received MDO for s/p joint. Pt has been screened per chart review . Anticipated therapy need: Home    NN anticipated at this time    PLAN: home, self care    Elisabet LEYVAW, MSW ext. [phone]

## 2024-12-19 NOTE — PROGRESS NOTES
Neurosurgery Progress Note    Assessment:   Alejandra Green in room 407/407-A, is a 79 year old female, Hospital Day ( LOS: 1 day ) hospitalized for Cervical stenosis of spine.      1 Day Post-Op s/p Procedure(s):  Transcervical exposure of prior Cervical 4 to 5 fusion, anterior Cervical 3 to 4 and 5 to 6 discectomies and fusion approaching from the left      The patient's other hospital problems include:   Active Hospital Problems    Hyperlipidemia      Hypothyroidism      S/P cervical spinal fusion      *C5-6 mod-severe, C6-7 mild-mod central stenosis      Essential hypertension    Surgical Hemovac drain was removed without complication.  Drain is intact.  Patient tolerated removal well.  Site dressed with Band-Aid and gauze.  Plan:   -Medical management per hospitalist team  -Every 4 hours neurochecks  -Continue pain control regimen  -Baseline cervical XR prior to discharge  -Aspen collar at all times  -PT/OT  -Will get patient a [place] collar for bathing  -Will get patient a bone growth stimulator, likely to be set up as an outpatient.  -Outpatient neurosurgery follow-up and discharge instructions documented  -Outpatient pain control sent to pharmacy  -Patient appropriate for discharge as early as today if cleared by therapy, medical team, and following completion of cervical x-ray    Plan of care discussed with Dr. Alves  Subjective:   Patient endorses expected postoperative neck and shoulder pain.  She feels that it is well-controlled with medications.  Notes some improvement in her radicular symptoms.  Endorses odynophagia, but is tolerating solids and liquids.  No trouble breathing.  No neck swelling.    Objective:   VITALS: /79 (BP Location: Left arm)   Pulse 74   Temp 98.5 °F (36.9 °C) (Oral)   Resp 15   Ht 67\"   Wt 166 lb 6.4 oz (75.5 kg)   SpO2 99%   BMI 26.06 kg/m²  Temp (24hrs), Av.1 °F (36.7 °C), Min:97.8 °F (36.6 °C), Max:98.5 °F (36.9 °C)       General: Well  developed, well nourished, in no acute distress.     Neck: Trachea midline.  No stridor.  Nontender.  No swelling.    Incision: Steri-Strips intact.  Open air.  Clean, dry, and intact. No signs of gross drainage, warmth, erythema, or hematoma formation     Drain:   Output by Drain (mL) 12/17/24 0700 - 12/17/24 1859 12/17/24 1900 - 12/18/24 0659 12/18/24 0700 - 12/18/24 1859 12/18/24 1900 - 12/19/24 0659 12/19/24 0700 - 12/19/24 0937   Closed Drain 1 Anterior Neck Accordion 10 Fr.   0 25         Neurological / Musculoskeletal: Awake, alert, and interactive. Recent and remote memory appear intact. Attention span and concentration are appropriate. No dysarthria. Coordination and motor control grossly intact. Patient follows commands briskly and appropriately.  Face symmetric.    Cervical Spine:    Motor/ Extremities   Deltoid Biceps Triceps    Sensation   R 5/5 5/5 5/5 5/5 Intact to light touch   L 5/5 5/5 5/5 5/5 Intact to light touch       Lumbar Spine:    Motor / Extremities   Hip   flexion Knee   extension Dorsiflexion Plantarflexion   Sensation   R 5/5 5/5 5/5 5/5 Intact to light touch   L 5/5 5/5 5/5 5/5 Intact to light touch         I&O: I/O last 3 completed shifts:  In: 3110 [P.O.:200; I.V.:2800; IV PIGGYBACK:110]  Out: 3400 [Urine:3350; Drains:25; Blood:25]       Labs:   Recent Labs   Lab 12/13/24  0806   RBC 5.07   HGB 14.3   HCT 44.5   MCV 87.8   MCH 28.2   MCHC 32.1   RDW 13.0   NEPRELIM 5.45   WBC 9.9   .0     No results for input(s): \"GLU\", \"BUN\", \"CREATSERUM\", \"GFRAA\", \"GFRNAA\", \"EGFRCR\", \"CA\", \"ALB\", \"NA\", \"K\", \"CL\", \"CO2\", \"ALKPHO\", \"AST\", \"ALT\", \"BILT\", \"TP\" in the last 168 hours.   No results for input(s): \"PT\", \"INR\", \"PTT\" in the last 168 hours.     Imaging:  Awaiting baseline cervical x-rays      Is this a shared or split note between Advanced Practice Provider and Physician?  Yes    Derek Robin PA-C  Physician Assistant- Neurosurgery   [facility]  12/19/2024, 9:37  AM        This document was created using Dragon voice recognition software and transcription variances may occur. Please contact documenting provider for clarification of contents if needed.

## 2024-12-19 NOTE — PROGRESS NOTES
[facility]  part of [facility]    Progress Note    Alejandra Green Patient Status:  Inpatient    1945 MRN [id number]   Location [facility] 4W/SW/SE Attending Sushant Tran,*   Hosp Day # 1 PCP Erin Lyles MD     Chief Complaint: neck pain    Subjective:     Constitutional:  Negative for appetite change, chills and diaphoresis.   HENT:  Positive for sore throat and trouble swallowing.    Respiratory:  Positive for cough. Negative for choking, shortness of breath, wheezing and stridor.    Cardiovascular:  Negative for chest pain, palpitations and leg swelling.   Gastrointestinal:  Negative for abdominal pain.   Genitourinary:  Negative for dysuria.   Musculoskeletal:  Positive for neck pain and neck stiffness.   Neurological:  Negative for weakness.   Psychiatric/Behavioral:  Negative for behavioral problems and agitation. The patient is not nervous/anxious and is not hyperactive.        Objective:   Blood pressure 153/65, pulse 75, temperature 98.3 °F (36.8 °C), temperature source Oral, resp. rate 16, height 5' 7\" (1.702 m), weight 166 lb 6.4 oz (75.5 kg), SpO2 96%.  Physical Exam  Vitals and nursing note reviewed.   HENT:      Head: Normocephalic and atraumatic.   Cardiovascular:      Rate and Rhythm: Normal rate.   Pulmonary:      Effort: Pulmonary effort is normal.      Breath sounds: Wheezing and rhonchi present.   Abdominal:      General: Bowel sounds are normal.      Palpations: Abdomen is soft.   Musculoskeletal:         General: No swelling.      Cervical back: Tenderness present.   Skin:     General: Skin is warm and dry.      Capillary Refill: Capillary refill takes less than 2 seconds.   Neurological:      General: No focal deficit present.      Mental Status: She is alert and oriented to person, place, and time.   Psychiatric:         Behavior: Behavior normal.         Judgment: Judgment normal.         Results:   Lab Results   Component Value Date     WBC 9.9 12/13/2024    HGB 14.3 12/13/2024    HCT 44.5 12/13/2024    .0 12/13/2024    CREATSERUM 0.64 11/11/2024    BUN 13 11/11/2024     11/11/2024    K 4.2 11/11/2024     11/11/2024    CO2 30.0 11/11/2024    GLU 86 11/11/2024    CA 9.9 11/11/2024    ALB 4.9 (H) 11/11/2024    ALKPHO 58 11/11/2024    BILT 0.9 11/11/2024    TP 7.4 11/11/2024    AST 8 11/11/2024    ALT 20 11/11/2024    PTT 29.3 11/11/2024    INR 0.99 11/11/2024       XR FLUOROSCOPY C-ARM TIME LESS THAN 1 HOUR (CPT=76000)    Result Date: 12/18/2024  CONCLUSION:  1. Saved images from cervical spine fusion.    Dictated by (CST): Abdullahi Gonzalez MD on 12/18/2024 at 4:38 PM     Finalized by (CST): Abdullahi Gonzalez MD on 12/18/2024 at 4:39 PM               Assessment & Plan:   1.       Cervical spinal stenosis status post transcervical exposure of prior C4-C5 fusion, exploration of fusion mass, C3-C4 and C5-C6 anterior cervical discectomy and fusion using 2 separate anterior fixation plates.  12/18/24;pod#1Pain control.  Neuro checks.  Monitor surgical wound and drain.  DVT prophylaxis.  Physical and occupational therapy when stable.  2.       Essential hypertension.  Continue home medications and monitor.  3.       Hyperlipidemia.  Continue home medications.  4.       Hypothyroidism.  Continue home medications.    Patient will require inpatient services that will reasonably be expected to span two midnight's based on the clinical documentation in H+P.   Based on patients current state of illness, I anticipate that, after discharge, patient will require TBD.  Complex mdm;coordinating care with nurse and counseling pt and with her permission her  in room about   Poss dc    DUSTIN MILLARD MD

## 2024-12-19 NOTE — DISCHARGE INSTRUCTIONS
Hold aspirin 5 days postoperatively    Dr. Alves's Post-operative Instructions  Anterior Cervical Discectomy and Fusion    Refer to this sheet in the next few weeks. These instructions provide you with information on caring for yourself after your procedure. Your treatment has been planned according to current medical practices, but problems sometimes occur. Call your health care provider if you have any problems or questions after your procedure: [phone].          -This surgery is done to relieve pressure on your nerves or spinal cord in your neck.     WHAT TO EXPECT AFTER THE PROCEDURE  After your procedure, it is typical to have the following symptoms:    Pain in the back of your neck   Numbness   Weakness     -Following surgery, these post-operative symptoms will improve with time.    HOME CARE INSTRUCTIONS  It can take 6-12 months to recover fully from this procedure. Make sure to follow all of your health care provider's instructions carefully.    Only take medicines as directed by your health care provider.  You will receive narcotic medication for pain, try to use this only as needed and begin to cut back as your pain improves.   You will receive muscle relaxant medication (Robaxin (methocarbamol) or Tizanidine (zanaflex)); Take this medication scheduled three times per day.   You will also receive two stool softeners to take as needed for constipation.   You can eat what you usually do. Occasionally patients experience difficulty swallowing after this procedure. If you experience this, try soft foods and smoothies for a few days.   Your activities will be limited for the first 3-4 weeks. In general:   ? It is fine to walk and climb stairs as much as tolerated.  ? Do not lift anything weighing more than 10 lb (4.5 kg).  ? Do not lift objects over your head.  ? Do not drive until your health care provider says it is okay, at least 7-10 days.     Take showers instead of baths until directed by your  health care provider. Ok to let water run over incision but do not scrub over incision or apply ointments or creams over incision.   You do not require a bandage, the incision will be covered with dermabond (skin glue).   The sutures are beneath the skin and dissolvable. You will not have to have them removed.   You may apply ice to the repaired area:    ? Put ice in a plastic bag.  ? Place a towel between your skin and the bag.  ? Leave the ice on for 20 minutes, 2-3 times a day.     You will return for follow-up appointment approximately two weeks post-op.    Medication  No NSAIDS until okay with surgeon.  NSAIDS (non-steroidal anti-inflammatory) include: Aspirin, Motrin, ibuprofen, Advil, Aleve, Naprosyn, Indocin, Nuprin, Vioxx, Celebrex, Bextra. (COMPLETE LIST IN GUIDEBOOK)  Tylenol (acetaminophen) is okay. Caution if your pain med contains Tylenol (acetaminophen); maximum 3 gms of Tylenol (acetaminophen) in 24 hours.  Take muscle relaxants and pain medications as prescribed allowing 30-45 minutes to take effect.  Do NOT drink alcohol while on pain medication.  Monitor need for pain medication refills closely.  Call pharmacy or physician office at least five days before out of pain medication. If calling physician office after 3 pm, it will be handled on the next business day.    SEEK MEDICAL CARE IF:   You have redness, swelling, or pain in your cut (incision) that is getting worse.   You have pus coming from the incision.   You have a fever.   There is a bad smell coming from the incision or bandage.   The edges of the incision break open after sutures or staples are taken out.   Your pain medicine is not helping.   You have swelling in your calf or leg.   You seem to be getting worse instead of better.     You develop shortness of breath or chest pain.   You have trouble swallowing.   You have pain, numbness, or weakness that is getting worse.    This information is not intended to replace advice given to you  by your health care provider. Make sure you discuss any questions you have with your health care provider.    Document Released: 12/18/2006 Document Revised: 10/23/2017 Document Reviewed: 10/23/2017  Elsevier Interactive Patient Education © 2018 [employer]

## 2024-12-19 NOTE — PHYSICAL THERAPY NOTE
PHYSICAL THERAPY EVALUATION - INPATIENT     Room Number: 407/407-A  Evaluation Date: 2024  Type of Evaluation: Initial   Physician Order: PT Eval and Treat    Presenting Problem: ACDF C4-6     Reason for Therapy: Mobility Dysfunction and Discharge Planning    PHYSICAL THERAPY ASSESSMENT   Patient is a 79 year old female admitted 2024.  Prior to admission, patient's baseline is independent.  Patient is currently functioning near baseline with bed mobility, transfers, and gait.  Patient is requiring minimal assist without use of RW as a result of the following impairments: impaired   balance and nauseated with frequently vomiting.  Physical Therapy will continue to follow for duration of hospitalization.     Patient will benefit from continued skilled PT Services at discharge to promote prior level of function.  Anticipate patient will return home.    PLAN DURING HOSPITALIZATION  Nursing Mobility Recommendation : 1 Assist     PT Treatment Plan: Bed mobility;Patient education;Family education;Gait training;Transfer training  Rehab Potential : Fair  Frequency (Obs): Daily     PHYSICAL THERAPY MEDICAL/SOCIAL HISTORY   History related to current admission:      Problem List  Principal Problem:    C5-6 mod-severe, C6-7 mild-mod central stenosis  Active Problems:    Essential hypertension    Hyperlipidemia    Hypothyroidism    S/P cervical spinal fusion      HOME SITUATION  Type of Home: House                        Lives With: Spouse              Prior Level of [place]: independent    SUBJECTIVE  \"I am going to be sick\"    PHYSICAL THERAPY EXAMINATION   OBJECTIVE  Precautions: Spine (aspen on AAT)       WEIGHT BEARING RESTRICTION       PAIN ASSESSMENT  Ratin  Location:  (neck)       COGNITION  Overall Cognitive Status:  WFL - within functional limits    RANGE OF MOTION AND STRENGTH ASSESSMENT  Upper extremity ROM and strength are within functional limits   Lower extremity ROM is within functional  limits   Lower extremity strength is within functional limits     BALANCE  Static Sitting: Fair  Dynamic Sitting: Fair  Static Standing: Fair -  Dynamic Standing: Fair -    ADDITIONAL TESTS                                    NEUROLOGICAL FINDINGS                      ACTIVITY TOLERANCE                         O2 WALK       AM-[facility] '6-Clicks' INPATIENT SHORT FORM - BASIC MOBILITY  How much difficulty does the patient currently have...  Patient Difficulty: Turning over in bed (including adjusting bedclothes, sheets and blankets)?: A Little   Patient Difficulty: Sitting down on and standing up from a chair with arms (e.g., wheelchair, bedside commode, etc.): A Little   Patient Difficulty: Moving from lying on back to sitting on the side of the bed?: A Little   How much help from another person does the patient currently need...   Help from Another: Moving to and from a bed to a chair (including a wheelchair)?: A Little   Help from Another: Need to walk in hospital room?: A Little   Help from Another: Climbing 3-5 steps with a railing?: A Little     AM-PAC Score:  Raw Score: 18   Approx Degree of Impairment: 46.58%   Standardized Score (AM-PAC Scale): 43.63   CMS Modifier (G-Code): CK    FUNCTIONAL ABILITY STATUS  Functional Mobility/Gait Assessment  Gait Assistance: Contact guard assist  Distance (ft): 450  Assistive Device: Rolling walker  Rolling: supervision  Supine to Sit: supervision  Sit to Supine: supervision  Sit to Stand: contact guard assist    Exercise/Education Provided:  Bed mobility  Gait training  Transfer training    Skilled Therapy Provided:     The patient's Approx Degree of Impairment: 46.58% has been calculated based on documentation in the [facility] '6 clicks' Inpatient Basic Mobility Short Form.  Research supports that patients with this level of impairment may benefit from .  Final disposition will be made by interdisciplinary medical team.    Patient End of Session: Up in chair    CURRENT  GOALS  Goals to be met by: 1/3/25  Patient Goal Patient's self-stated goal is: home   Goal #1 Patient is able to demonstrate supine - sit EOB @ level: supervision     Goal #1   Current Status    Goal #2 Patient is able to demonstrate transfers Sit to/from Stand at assistance level: supervision with walker - rolling     Goal #2  Current Status    Goal #3 Patient is able to ambulate 150 feet with assist device: walker - rolling at assistance level: supervision   Goal #3   Current Status    Goal #4 Patient will negotiate 3 stairs/one curb w/ assistive device and supervision   Goal #4   Current Status    Goal #5 Patient to demonstrate independence with home activity/exercise instructions provided to patient in preparation for discharge.   Goal #5   Current Status    Goal #6    Goal #6  Current Status      Patient Evaluation Complexity Level:  History Low - no personal factors and/or co-morbidities   Examination of body systems Low -  addressing 1-2 elements   Clinical Presentation Low- Stable   Clinical Decision Making  Low Complexity     Gait Trainin minutes

## 2024-12-19 NOTE — OCCUPATIONAL THERAPY NOTE
OCCUPATIONAL THERAPY EVALUATION - INPATIENT     Room Number: 407/407-A  Evaluation Date: 2024  Type of Evaluation: Initial  Presenting Problem: C4-5 anterior fusion    Physician Order: IP Consult to Occupational Therapy  Reason for Therapy: ADL/IADL Dysfunction and Discharge Planning    OCCUPATIONAL THERAPY ASSESSMENT   Patient is a 79 year old female admitted 2024 for C4-5 anterior fusion.  Prior to admission, patient was living w/ her  in a 1 story home. Pt reports being independent w/ adls, ambulation w/o an ad and driving.  Patient is currently functioning below baseline with adls and functional mobility due to pain and spine precautions following surgery. Anticipate pt will be able to return home w/ support of spouse.    PLAN  Patient has been evaluated and presents with no skilled Occupational Therapy needs  at this time.  Patient will be discharged from Occupational Therapy services.   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 Pt reports being independent w/ adls, ambulation w/o an ad and driving.    SUBJECTIVE  \"I'm sorry\"    OCCUPATIONAL THERAPY EXAMINATION    OBJECTIVE  Precautions: Spine  Fall Risk: Standard fall risk    PAIN ASSESSMENT  Ratin  Location: -- (surgical site)  Management Techniques: Relaxation; Repositioning    ACTIVITY TOLERANCE  Limited by nausea    O2 SATURATIONS  Activity on room air    SENSATION  Pt denied  numbness/tingling    RANGE OF MOTION   Upper extremity ROM is within functional limits     STRENGTH ASSESSMENT  Upper extremity strength is within functional limits     ACTIVITIES OF DAILY LIVING ASSESSMENT  AM-PAC ‘6-Clicks’ Inpatient Daily Activity Short Form  How much help from another person does the patient currently need…  -   Putting on and taking off regular lower body clothing?: A Little  -   Bathing (including washing, rinsing, drying)?: A Little  -   Toileting, which includes using toilet, bedpan or urinal? : A Little  -   Putting on and taking off regular upper body clothing?: A Little  -   Taking care of personal grooming such as brushing teeth?: None  -   Eating meals?: None    AM-PAC Score:  Score: 20  Approx Degree of Impairment: 38.32%  Standardized Score (AM-PAC Scale): 42.03  CMS Modifier (G-Code): CJ    FUNCTIONAL ADL ASSESSMENT  Eating: independent  Grooming: setup assist  UB Dressing: min assist  LB Dressing: min assist  Toileting: setup assist    Skilled Therapy Provided: OT orders received and chart reviewed. RN approving of pt participation in therapy. Treatment coordinated w/ PT. Pt received in bed, alert and oriented x 4;spouse at bedside. Spine precautions reviewed w/ emphasis on adls and transfers including car and shower. Brace management and wearing schedule reinforced. Brace adjusted for maximal support and comfort. Pt verbalizing understanding of information as discussed. Spine packet at beside. On initial contact pt transitioned supine<>sit at eob w/supervision.Pt plans to sleep in a recliner at home. Pt performing sit<>stand transfers w/ supervision. Pt ambulating in the [place] w/ rw and close supervision. Pt w/ episode of nausea w/ emesis which limited overall activity tolerance    Pt currently unable to identify potential areas of concern in anticipation of discharge. Pt presenting w/ sound judgement and good insight into limitations following surgery. Pt will have assist of spouse  as needed    At end of session pt remaining up in chair w/ all needs in reach;spouse at bedside. RN aware of pt's status and performance in therapy. No further OT contact planned       EDUCATION PROVIDED  Patient Education : Role of Occupational Therapy; Plan of Care; Functional Transfer Techniques; Fall Prevention; Surgical Precautions; Posture/Positioning; Bracing Education Provided (compensatory strategies)  Patient's Response to Education: Verbalized Understanding; Returned Demonstration    The patient's Approx Degree of Impairment: 38.32% has been calculated based on documentation in the [facility] '6 clicks' Inpatient Daily Activity Short Form.  Research supports that patients with this level of impairment may benefit from return to home w/ HH.  Final disposition will be made by interdisciplinary medical team.    Patient End of Session: Up in chair;Needs met;Call light within reach;RN aware of session/findings;All patient questions and concerns addressed;Family present    Patient Evaluation Complexity Level:   Occupational Profile/Medical History LOW - Brief history including review of medical or therapy records    Specific performance deficits impacting engagement in ADL/IADL LOW  1 - 3 performance deficits    Client Assessment/Performance Deficits MODERATE - Comorbidities and min to mod modifications of tasks    Clinical Decision Making LOW - Analysis of occupational profile, problem-focused assessments, limited treatment options    Overall Complexity LOW     Therapeutic Activity: 20 minutes

## 2024-12-20 PROCEDURE — 99233 SBSQ HOSP IP/OBS HIGH 50: CPT | Performed by: HOSPITALIST

## 2024-12-20 RX ORDER — DEXAMETHASONE SODIUM PHOSPHATE 4 MG/ML
4 VIAL (ML) INJECTION EVERY 6 HOURS
Status: DISCONTINUED | OUTPATIENT
Start: 2024-12-20 | End: 2024-12-20

## 2024-12-20 RX ORDER — DEXAMETHASONE SODIUM PHOSPHATE 4 MG/ML
4 VIAL (ML) INJECTION EVERY 6 HOURS
Status: COMPLETED | OUTPATIENT
Start: 2024-12-20 | End: 2024-12-22

## 2024-12-20 RX ORDER — METHYLPREDNISOLONE 4 MG/1
TABLET ORAL
Qty: 21 TABLET | Refills: 0 | Status: SHIPPED | OUTPATIENT
Start: 2024-12-20

## 2024-12-20 NOTE — PLAN OF CARE
Patient is A/Ox4. On RA. Pureed diet with mildly thick liquids by tsp. Voiding freely. Up independently. Aspen collar in place at all times. PRN oxy and dilaudid given for pain. IV decadron given per order. SCDs and lovenox for DVT prophylaxis. Call light and personal items within reach. Spouse at bedside. Plan for discharge once medically cleared.       Problem: PAIN - ADULT  Goal: Verbalizes/displays adequate comfort level or patient's stated pain goal  Description: INTERVENTIONS:  - Encourage pt to monitor pain and request assistance  - Assess pain using appropriate pain scale  - Administer analgesics based on type and severity of pain and evaluate response  - Implement non-pharmacological measures as appropriate and evaluate response  - Consider cultural and social influences on pain and pain management  - Manage/alleviate anxiety  - Utilize distraction and/or relaxation techniques  - Monitor for opioid side effects  - Notify MD/LIP if interventions unsuccessful or patient reports new pain  - Anticipate increased pain with activity and pre-medicate as appropriate  Outcome: Progressing     Problem: RISK FOR INFECTION - ADULT  Goal: Absence of fever/infection during anticipated neutropenic period  Description: INTERVENTIONS  - Monitor WBC  - Administer growth factors as ordered  - Implement neutropenic guidelines  Outcome: Progressing     Problem: SAFETY ADULT - FALL  Goal: Free from fall injury  Description: INTERVENTIONS:  - Assess pt frequently for physical needs  - Identify cognitive and physical deficits and behaviors that affect risk of falls.  - [place] fall precautions as indicated by assessment.  - Educate pt/family on patient safety including physical limitations  - Instruct pt to call for assistance with activity based on assessment  - Modify environment to reduce risk of injury  - Provide assistive devices as appropriate  - Consider OT/PT consult to assist with strengthening/mobility  - Encourage  toileting schedule  Outcome: Progressing     Problem: DISCHARGE PLANNING  Goal: Discharge to home or other facility with appropriate resources  Description: INTERVENTIONS:  - Identify barriers to discharge w/pt and caregiver  - Include patient/family/discharge partner in discharge planning  - Arrange for needed discharge resources and transportation as appropriate  - Identify discharge learning needs (meds, wound care, etc)  - Arrange for interpreters to assist at discharge as needed  - Consider post-discharge preferences of patient/family/discharge partner  - Complete POLST form as appropriate  - Assess patient's ability to be responsible for managing their own health  - Refer to Case Management Department for coordinating discharge planning if the patient needs post-hospital services based on physician/LIP order or complex needs related to functional status, cognitive ability or social support system  Outcome: Progressing

## 2024-12-20 NOTE — PHYSICAL THERAPY NOTE
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      Patient is requiring stand-by assist as a result of the following impairments: decreased functional strength, decreased endurance/aerobic capacity, pain, and decreased muscular endurance.     Patient continues to function below baseline with bed mobility, transfers, gait, stair negotiation, and standing prolonged periods.  Next session anticipate patient to progress bed mobility, transfers, gait, stair negotiation, and standing prolonged periods.  Physical Therapy will continue to follow patient for duration of hospitalization.    Patient continues to benefit from continued skilled PT services: with no additional skilled services but increased support at home.    PLAN DURING HOSPITALIZATION  Nursing Mobility Recommendation : 1 Assist     PT Treatment Plan: Bed mobility;Body mechanics;Coordination;Endurance;Energy conservation;Patient education;Gait training;Strengthening;Stoop training;Stair training;Transfer training;Balance training  Frequency (Obs): Daily     SUBJECTIVE  Pt was agreeable to therapy session.         OBJECTIVE  Precautions: Spine    WEIGHT BEARING RESTRICTION       PAIN ASSESSMENT   Ratin  Location: cervical  Management Techniques: Activity promotion;Body mechanics;Relaxation;Repositioning    BALANCE  Static Sitting: Good  Dynamic Sitting: Fair +  Static Standing: Fair  Dynamic Standing: Fair    ACTIVITY TOLERANCE                          O2 WALK       AM-PAC '6-Clicks' INPATIENT SHORT FORM - BASIC MOBILITY  How much difficulty does the patient currently have...  Patient  Difficulty: Turning over in bed (including adjusting bedclothes, sheets and blankets)?: A Little   Patient Difficulty: Sitting down on and standing up from a chair with arms (e.g., wheelchair, bedside commode, etc.): A Little   Patient Difficulty: Moving from lying on back to sitting on the side of the bed?: A Little   How much help from another person does the patient currently need...   Help from Another: Moving to and from a bed to a chair (including a wheelchair)?: A Little   Help from Another: Need to walk in hospital room?: A Little   Help from Another: Climbing 3-5 steps with a railing?: A Little     AM-PAC Score:  Raw Score: 18   Approx Degree of Impairment: 46.58%   Standardized Score (AM-PAC Scale): 43.63   CMS Modifier (G-Code): CK    FUNCTIONAL ABILITY STATUS  Functional Mobility/Gait Assessment  Gait Assistance: Supervision  Distance (ft): 600'  Assistive Device: Rolling walker  Pattern:  (narrow SABRINA)  Stairs: Stairs  How Many Stairs: 4  Device: 1 Rail  Assist: Contact guard assist  Pattern: Ascend and Descend  Ascend and Descend : Step to  Rolling: stand-by assist  Supine to Sit: stand-by assist  Sit to Supine: stand-by assist  Sit to Stand: stand-by assist    Skilled Therapy Provided: Pt is received in the chair and was cleared for therapy session. Pt with her cervical brace on at all times. Pt reviewed and maintained all spinal precautions throughout the session. Pt educated and practiced log rolling in the bed with SBA. Pt is cued for proper technique. Pt is SBA with all sit<>stand transfers with no AD. Pt was able to AMB in the hallway about 600' with the RW SBA for balance and safety. Pt with very good balance and safety awareness. Pt also educated and negotiated 4 stairs with 1 HR CGA for safety. Pt is cued for proper sequencing and technique. Pt with very good balance on the stairs. Returned pt back to the room and to sitting in the chair with all needs within reach. Pt is on track to dc to home  once medically cleared. Reported ot the RN on the status of the pt.     The patient's Approx Degree of Impairment: 46.58% has been calculated based on documentation in the [facility] '6 clicks' Inpatient Daily Activity Short Form.  Research supports that patients with this level of impairment may benefit from Home with assist.  Final disposition will be made by interdisciplinary medical team.        Patient End of Session: Up in chair;Needs met;Call light within reach;RN aware of session/findings;Bracing education provided per handout;All patient questions and concerns addressed;Hospital anti-slip socks;Family present    CURRENT GOALS   Goals to be met by: 1/3/25  Patient Goal Patient's self-stated goal is: home   Goal #1 Patient is able to demonstrate supine - sit EOB @ level: supervision     Goal #1   Current Status SBA log rolling    Goal #2 Patient is able to demonstrate transfers Sit to/from Stand at assistance level: supervision with walker - rolling     Goal #2  Current Status SBA with no AD    Goal #3 Patient is able to ambulate 150 feet with assist device: walker - rolling at assistance level: supervision   Goal #3   Current Status 600' with the RW SBA    Goal #4 Patient will negotiate 3 stairs/one curb w/ assistive device and supervision   Goal #4   Current Status 4 stairs with 1 HR CGA    Goal #5 Patient to demonstrate independence with home activity/exercise instructions provided to patient in preparation for discharge.   Goal #5   Current Status    Goal #6    Goal #6  Current Status        Therapeutic Activity: 25 minutes

## 2024-12-20 NOTE — PROGRESS NOTES
[facility]  part of [facility]    Progress Note    Alejandra Green Patient Status:  Inpatient    1945 MRN [id number]   Location [facility] 4W/SW/SE Attending Sushant Tran,*   Hosp Day # 2 PCP Erin Lyles MD     Chief Complaint: neck pain    Subjective:     Constitutional:  Negative for appetite change, chills and diaphoresis.   HENT:  Positive for sore throat and trouble swallowing.    Respiratory:  Positive for cough. Negative for choking, shortness of breath, wheezing and stridor.    Cardiovascular:  Negative for chest pain, palpitations and leg swelling.   Gastrointestinal:  Negative for abdominal pain.   Genitourinary:  Negative for dysuria.   Musculoskeletal:  Positive for neck pain and neck stiffness.   Neurological:  Negative for weakness.   Psychiatric/Behavioral:  Negative for behavioral problems and agitation. The patient is not nervous/anxious and is not hyperactive.        Objective:   Blood pressure 152/77, pulse 84, temperature 98.3 °F (36.8 °C), temperature source Oral, resp. rate 16, height 5' 7\" (1.702 m), weight 166 lb 6.4 oz (75.5 kg), SpO2 94%.  Physical Exam  Vitals and nursing note reviewed.   HENT:      Head: Normocephalic and atraumatic.   Cardiovascular:      Rate and Rhythm: Normal rate.   Pulmonary:      Effort: Pulmonary effort is normal.      Breath sounds: Wheezing and rhonchi present.   Abdominal:      General: Bowel sounds are normal.      Palpations: Abdomen is soft.   Musculoskeletal:         General: No swelling.      Cervical back: Tenderness present.   Skin:     General: Skin is warm and dry.      Capillary Refill: Capillary refill takes less than 2 seconds.   Neurological:      General: No focal deficit present.      Mental Status: She is alert and oriented to person, place, and time.   Psychiatric:         Behavior: Behavior normal.         Judgment: Judgment normal.         Results:   Lab Results   Component Value Date     WBC 9.9 12/13/2024    HGB 14.3 12/13/2024    HCT 44.5 12/13/2024    .0 12/13/2024    CREATSERUM 0.64 11/11/2024    BUN 13 11/11/2024     11/11/2024    K 4.2 11/11/2024     11/11/2024    CO2 30.0 11/11/2024    GLU 86 11/11/2024    CA 9.9 11/11/2024    ALB 4.9 (H) 11/11/2024    ALKPHO 58 11/11/2024    BILT 0.9 11/11/2024    TP 7.4 11/11/2024    AST 8 11/11/2024    ALT 20 11/11/2024    PTT 29.3 11/11/2024    INR 0.99 11/11/2024       XR SOFT TISSUE NECK (CPT=70360)    Result Date: 12/19/2024  CONCLUSION:   Moderate prevertebral postoperative swelling.  No radiopaque foreign body.  Blunting of the aryepiglottic folds within the subglottic airway, likely related to local postoperative edema.      Dictated by (CST): Eve Fabian MD on 12/19/2024 at 8:44 PM     Finalized by (CST): Eve Fabian MD on 12/19/2024 at 8:46 PM          XR CERVICAL SPINE (2-3 VIEWS) (CPT=72040)    Result Date: 12/19/2024  CONCLUSION:   Postoperative changes after C3-4 and C5-6 anterior and interbody fusion, without radiographic evidence of hardware failure.  Background of moderate degenerative disease of the spine elsewhere.  Postoperative prevertebral soft tissue swelling at C5-6.  Demineralization.      Dictated by (CST): Eve Fabian MD on 12/19/2024 at 4:30 PM     Finalized by (CST): Eve Fabian MD on 12/19/2024 at 4:33 PM          XR FLUOROSCOPY C-ARM TIME LESS THAN 1 HOUR (CPT=76000)    Result Date: 12/18/2024  CONCLUSION:  1. Saved images from cervical spine fusion.    Dictated by (CST): Abdullahi Gonzalez MD on 12/18/2024 at 4:38 PM     Finalized by (CST): Abdullahi Gonzalez MD on 12/18/2024 at 4:39 PM               Assessment & Plan:   1.       Cervical spinal stenosis status post transcervical exposure of prior C4-C5 fusion, exploration of fusion mass, C3-C4 and C5-C6 anterior cervical discectomy and fusion using 2 separate anterior fixation plates.  12/18/24;pod#1Pain control.  Neuro checks.  Monitor  surgical wound and drain.  DVT prophylaxis.  Physical and occupational therapy; throat swelling with difficulty swallowing; decadron started; poss dc tomorrow  2.       Essential hypertension.  Continue home medications and monitor.  3.       Hyperlipidemia.  Continue home medications.  4.       Hypothyroidism.  Continue home medications.    Patient will require inpatient services that will reasonably be expected to span two midnight's based on the clinical documentation in H+P.   Based on patients current state of illness, I anticipate that, after discharge, patient will require TBD.  Complex mdm;coordinating care with nurse and counseling pt and with her permission her  in room about   Poss dc tomorrow if throat better    DUSTIN MILLARD MD

## 2024-12-20 NOTE — SLP NOTE
ADULT SWALLOWING EVALUATION    ASSESSMENT    ASSESSMENT/OVERALL IMPRESSION:  PPE REQUIRED. THIS THERAPIST WORE GLOVES FOR DURATION OF EVALUATION. HANDS WASHED UPON ENTRANCE/EXIT.    Per MD H&P, \"The patient is a 79-year-old  female with chronic neck pain and radiculopathy. MRI scan of the cervical spine showed multilevel cervical spinal stenosis and spondylosis, most pronounced at C5-C6. She had history of C4-C5 anterior cervical discectomy and fusion. Failed outpatient conservative medical management options, scheduled today for above-mentioned procedure by her neurosurgeon, Dr. Gerald Doe. Postoperatively transferred to PACU for further monitoring.\"    SLP BSSE orders received and acknowledged. A swallow evaluation warranted per RN dysphagia screen. Pt afebrile with clear vocal quality, on room air, with oxygen saturation at 94%. Pt with no hx of dysphagia at [facility].   Pt positioned 90 degrees in bedside chair, alert/cooperative/spouse present. Pt with no complaints of pain. Oral motor skills within functional limits. Pt presented with trials of puree, mildly thick and thin liquids via tsp. Pt with adequate oral acceptance and bilabial seal across all trials. Pt with intact bolus formation/propulsion. Pt's swallow response appears delayed with reduced hyolaryngeal elevation/excursion. No clinical signs of aspiration (e.g., immediate/delayed throat clear, immediate/delayed cough, wet vocal quality, increased O2 effort) observed across all trials however pt with visible difficulty and discomfort. No CXR on this admission. Oxygen status remained stable t/o the entire evaluation.     At this time, pt presents with functional oral swallow stage and probable pharyngeal dysfunction. Recommend a puree diet and mildly thick liquids with strict adherence to safe swallowing compensatory strategies. Results and recommendations reviewed with RN, pt, and family. Pt/pt's family v/u to all  results/recommendations. Recommendations remain written on whiteboard. SLP collaborated with RN for MD diet orders.     PLAN: SLP to f/u x2-3 meal assessments, monitor imaging, and VFSS if clinically indicated       RECOMMENDATIONS   Diet Recommendations - Solids: Puree  Diet Recommendations - Liquids: Nectar thick liquids/ Mildly thick                    Compensatory Strategies Recommended: Liquids via spoon  Aspiration Precautions: Upright position;Slow rate;Small bites;Small sips;No straw  Medication Administration Recommendations: Crushed in puree  Treatment Plan/Recommendations: Aspiration precautions    HISTORY   MEDICAL HISTORY  Reason for Referral: RN dysphagia screen    Problem List  Principal Problem:    C5-6 mod-severe, C6-7 mild-mod central stenosis  Active Problems:    Essential hypertension    Hyperlipidemia    Hypothyroidism    S/P cervical spinal fusion      Past Medical History  Past Medical History:    Back problem    Disorder of thyroid    Essential hypertension    High blood pressure    High cholesterol    Hyperlipidemia    Osteoarthritis    PONV (postoperative nausea and vomiting)    Visual impairment       Prior Living Situation: Home with spouse  Diet Prior to Admission: Regular;Thin liquids       Patient/Family Goals: tolerate po    SWALLOWING HISTORY  Current Diet Consistency: NPO  Dysphagia History: none  Imaging Results: 12/19 XR SOFT TISSUE OF NECK  CONCLUSION:   Moderate prevertebral postoperative swelling.  No radiopaque foreign body.   Blunting of the aryepiglottic folds within the subglottic airway, likely related to local postoperative edema.         SUBJECTIVE       OBJECTIVE   ORAL MOTOR EXAMINATION  Dentition: Functional  Symmetry: Within Functional Limits  Strength: Within Functional Limits  Tone: Within Functional Limits  Range of Motion: Within Functional Limits  Rate of Motion: Within Functional Limits    Voice Quality: Clear  Respiratory Status: Unlabored  Consistencies  Trialed: Thin liquids;Nectar thick liquids;Puree  Method of Presentation: Self presentation;Staff/Clinician assistance;Spoon  Patient Positioned: Upright;Midline;Bedside chair    Oral Phase of Swallow: Within Functional Limits                    Pharyngeal Phase of Swallow: Appears Impaired  Laryngeal Elevation Timing: Appears impaired  Laryngeal Elevation Strength: Appears impaired  Laryngeal Elevation Coordination: Appears impaired  (Please note: Silent aspiration cannot be evaluated clinically. Videofluoroscopic Swallow Study is required to rule-out silent aspiration.)    Esophageal Phase of Swallow: No complaints consistent with possible esophageal involvement  Comments: NA          GOALS  Goal #1 The patient will tolerate puree consistency and mildly thick liquids without overt signs or symptoms of aspiration with 100 % accuracy over 1-2 session(s).  In Progress   Goal #2 The patient/family/caregiver will demonstrate understanding and implementation of aspiration precautions and swallow strategies independently over 1-2 session(s).    In Progress   Goal #3 The patient will tolerate trial upgrade of soft/hard solid consistency and thin liquids without overt signs or symptoms of aspiration with 100 % accuracy over 1-2 session(s).  In Progress   Goal #4 The patient will utilize compensatory strategies as outlined by  BSSE (clinical evaluation) including Slow rate, Small bites, Small sips, Multiple swallows, Alternate liquids/solids, No straws, Upright 90 degrees, Upright 90 degrees 30 mins after meal, Liquids via tsp amount only, Eliminate distractions with PRN assistance 100 % of the time across 2 sessions.    In Progress     FOLLOW UP  Treatment Plan/Recommendations: Aspiration precautions  Duration: 1 week  Follow Up Needed (Documentation Required): Yes  SLP Follow-up Date: 12/21/24    Thank you for your referral.   If you have any questions, please contact RUBY Lee M.S. CCC-SLP  Speech  Language Pathologist  Phone Number Ext. [phone]

## 2024-12-20 NOTE — PROGRESS NOTES
[facility]  part of [facility]    Neurosurgery Progress Note    Alejandra Green Patient Status:  Inpatient    1945 MRN [id number]   Location [facility] 4W/SW/SE Attending Sushant Tran,*   Hosp Day # 2 PCP Erin Lyles MD     Subjective:  Alejandra Green is a(n) 79 year old female postoperative day 2 status post C3-4 and C5-6 ACDF.  Patient with odynophagia and dysphagia overnight.  Initiated IV Decadron.  Patient with cervical collar in place  Alert, orientated x3.  Cooperative.  No apparent distress.    Vital Signs:    Temp:  [98.1 °F (36.7 °C)-98.6 °F (37 °C)] 98.3 °F (36.8 °C)  Pulse:  [74-84] 84  Resp:  [16] 16  BP: (152-169)/(65-85) 152/77  SpO2:  [94 %-96 %] 94 %    I/O:  I/O last 3 completed shifts:  In: 340 [P.O.:100; I.V.:240]  Out: 1675 [Urine:1650; Drains:25]    Inpatient Medications:    Current Facility-Administered Medications:     dexamethasone (Decadron) 4 MG/ML injection 4 mg, 4 mg, Intravenous, Q6H    lactated ringers infusion, , Intravenous, Continuous    gabapentin (Neurontin) cap 300 mg, 300 mg, Oral, Nightly    levothyroxine (Synthroid) tab 75 mcg, 75 mcg, Oral, Before breakfast    rosuvastatin (Crestor) tab 10 mg, 10 mg, Oral, Daily    sennosides (Senokot) tab 17.2 mg, 17.2 mg, Oral, Nightly    docusate sodium (Colace) cap 100 mg, 100 mg, Oral, BID    polyethylene glycol (PEG 3350) (Miralax) 17 g oral packet 17 g, 17 g, Oral, Daily PRN    magnesium hydroxide (Milk of Magnesia) 400 MG/5ML oral suspension 30 mL, 30 mL, Oral, Daily PRN    bisacodyl (Dulcolax) 10 MG rectal suppository 10 mg, 10 mg, Rectal, Daily PRN    fleet enema (Fleet) rectal enema 133 mL, 1 enema, Rectal, Once PRN    ondansetron (Zofran) 4 MG/2ML injection 4 mg, 4 mg, Intravenous, Q6H PRN    metoclopramide (Reglan) 5 mg/mL injection 10 mg, 10 mg, Intravenous, Q8H PRN    diphenhydrAMINE (Benadryl) cap/tab 25 mg, 25 mg, Oral, Q4H PRN **OR** diphenhydrAMINE  (Benadryl) 50 mg/mL  injection 25 mg, 25 mg, Intravenous, Q4H PRN    benzocaine-menthol (Cepacol) lozenge 1 lozenge, 1 lozenge, Buccal, Q15 Min PRN    enoxaparin (Lovenox) 40 MG/0.4ML SUBQ injection 40 mg, 40 mg, Subcutaneous, Daily    oxyCODONE immediate release tab 2.5 mg, 2.5 mg, Oral, Q4H PRN **OR** oxyCODONE immediate release tab 5 mg, 5 mg, Oral, Q4H PRN    HYDROmorphone (Dilaudid) 1 MG/ML injection 0.2 mg, 0.2 mg, Intravenous, Q2H PRN **OR** HYDROmorphone (Dilaudid) 1 MG/ML injection 0.4 mg, 0.4 mg, Intravenous, Q2H PRN    acetaminophen (Ofirmev) 10 mg/mL infusion premix 1,000 mg, 1,000 mg, Intravenous, Q6H    methocarbamol (Robaxin) tab 500 mg, 500 mg, Oral, q6h    scopolamine (Transderm-Scop) 1 MG/3DAYS patch 1 patch, 1 patch, Transdermal, PRN    lisinopril (Prinivil; Zestril) tab 5 mg, 5 mg, Oral, Daily    hydrALAzine (Apresoline) 20 mg/mL injection 10 mg, 10 mg, Intravenous, Q4H PRN    Labs:  Lab Results   Component Value Date    WBC 9.9 12/13/2024    HGB 14.3 12/13/2024    .0 12/13/2024    BUN 13 11/11/2024     11/11/2024    K 4.2 11/11/2024    CO2 30.0 11/11/2024    GLU 86 11/11/2024    ALB 4.9 (H) 11/11/2024    PTT 29.3 11/11/2024    INR 0.99 11/11/2024         Neurological Exam:  Strength:  5 out of 5 in bilateral upper extremities    Sensation:  Intact in bilateral upper extremities throughout    Incision:  Clean/dry/intact. No erythema, discharge, warmth.     Abdomen:  Soft, non-distended, non-tender, with no rebound or guarding.  No peritoneal signs.   Extremities:  Non-tender, no lower extremity edema noted.        Imaging:  XR SOFT TISSUE NECK (CPT=70360)    Result Date: 12/19/2024  CONCLUSION:   Moderate prevertebral postoperative swelling.  No radiopaque foreign body.  Blunting of the aryepiglottic folds within the subglottic airway, likely related to local postoperative edema.      Dictated by (CST): Eve Fabian MD on 12/19/2024 at 8:44 PM     Finalized by (CST): Rui  MD Eve on 12/19/2024 at 8:46 PM          XR CERVICAL SPINE (2-3 VIEWS) (CPT=72040)    Result Date: 12/19/2024  CONCLUSION:   Postoperative changes after C3-4 and C5-6 anterior and interbody fusion, without radiographic evidence of hardware failure.  Background of moderate degenerative disease of the spine elsewhere.  Postoperative prevertebral soft tissue swelling at C5-6.  Demineralization.      Dictated by (CST): Eve Fabian MD on 12/19/2024 at 4:30 PM     Finalized by (CST): Eve Fabian MD on 12/19/2024 at 4:33 PM           Assessment/Plan:  Principal Problem:    C5-6 mod-severe, C6-7 mild-mod central stenosis  Active Problems:    Essential hypertension    Hyperlipidemia    Hypothyroidism    S/P cervical spinal fusion      Alejandra Green is a(n) 79 year old female postoperative day 2 status post C3-4 and C5-6 ACDF.  Initiated IV Decadron overnight for dysphagia and odynophagia.  Cervical collar in place.  X-ray soft tissue evaluated which shows moderate prevertebral postoperative swelling.  PT/OT  Pain control  IV Decadron initiated to help with prevertebral soft tissue swelling postoperatively  Hold aspirin for 5 days postoperatively  Patient to utilize [place] collar at all times  Baseline cervical x-ray for postoperative instrumentation appears without complication  Pain medication sent to the pharmacy upon discharge    All questions and concerns were addressed. We appreciate the opportunity to participate in the care of this patient. Please do not hesitate to call our office ([phone]) with any issues.    Glenn Cruz PA-C  12/20/2024  9:47 AM

## 2024-12-20 NOTE — PLAN OF CARE
Aox4, having difficulty and discomfort swallowing.  MD paged, state soft tissue xray ordered and results forwarded to provider. Steroid prescribed.  Dr. Alves will round early Friday.     Problem: Patient Centered Care  Goal: Patient preferences are identified and integrated in the patient's plan of care  Description: Interventions:  - What would you like us to know as we care for you?   - Provide timely, complete, and accurate information to patient/family  - Incorporate patient and family knowledge, values, beliefs, and cultural backgrounds into the planning and delivery of care  - Encourage patient/family to participate in care and decision-making at the level they choose  - Honor patient and family perspectives and choices  Outcome: Progressing     Problem: Patient/Family Goals  Goal: Patient/Family Long Term Goal  Description: Patient's Long Term Goal:     Interventions:  -   - See additional Care Plan goals for specific interventions  Outcome: Progressing  Goal: Patient/Family Short Term Goal  Description: Patient's Short Term Goal:     Interventions:   -   - See additional Care Plan goals for specific interventions  Outcome: Progressing     Problem: PAIN - ADULT  Goal: Verbalizes/displays adequate comfort level or patient's stated pain goal  Description: INTERVENTIONS:  - Encourage pt to monitor pain and request assistance  - Assess pain using appropriate pain scale  - Administer analgesics based on type and severity of pain and evaluate response  - Implement non-pharmacological measures as appropriate and evaluate response  - Consider cultural and social influences on pain and pain management  - Manage/alleviate anxiety  - Utilize distraction and/or relaxation techniques  - Monitor for opioid side effects  - Notify MD/LIP if interventions unsuccessful or patient reports new pain  - Anticipate increased pain with activity and pre-medicate as appropriate  Outcome: Progressing     Problem: RISK FOR INFECTION  - ADULT  Goal: Absence of fever/infection during anticipated neutropenic period  Description: INTERVENTIONS  - Monitor WBC  - Administer growth factors as ordered  - Implement neutropenic guidelines  Outcome: Progressing     Problem: SAFETY ADULT - FALL  Goal: Free from fall injury  Description: INTERVENTIONS:  - Assess pt frequently for physical needs  - Identify cognitive and physical deficits and behaviors that affect risk of falls.  - [place] fall precautions as indicated by assessment.  - Educate pt/family on patient safety including physical limitations  - Instruct pt to call for assistance with activity based on assessment  - Modify environment to reduce risk of injury  - Provide assistive devices as appropriate  - Consider OT/PT consult to assist with strengthening/mobility  - Encourage toileting schedule  Outcome: Progressing     Problem: DISCHARGE PLANNING  Goal: Discharge to home or other facility with appropriate resources  Description: INTERVENTIONS:  - Identify barriers to discharge w/pt and caregiver  - Include patient/family/discharge partner in discharge planning  - Arrange for needed discharge resources and transportation as appropriate  - Identify discharge learning needs (meds, wound care, etc)  - Arrange for interpreters to assist at discharge as needed  - Consider post-discharge preferences of patient/family/discharge partner  - Complete POLST form as appropriate  - Assess patient's ability to be responsible for managing their own health  - Refer to Case Management Department for coordinating discharge planning if the patient needs post-hospital services based on physician/LIP order or complex needs related to functional status, cognitive ability or social support system  Outcome: Progressing     Problem: Patient/Family Goals  Goal: Patient/Family Long Term Goal  Description: Patient's Long Term Goal: \  Interventions:  -   - See additional Care Plan goals for specific interventions  Outcome:  Progressing     Problem: PAIN - ADULT  Goal: Verbalizes/displays adequate comfort level or patient's stated pain goal  Description: INTERVENTIONS:  - Encourage pt to monitor pain and request assistance  - Assess pain using appropriate pain scale  - Administer analgesics based on type and severity of pain and evaluate response  - Implement non-pharmacological measures as appropriate and evaluate response  - Consider cultural and social influences on pain and pain management  - Manage/alleviate anxiety  - Utilize distraction and/or relaxation techniques  - Monitor for opioid side effects  - Notify MD/LIP if interventions unsuccessful or patient reports new pain  - Anticipate increased pain with activity and pre-medicate as appropriate  Outcome: Progressing     Problem: RISK FOR INFECTION - ADULT  Goal: Absence of fever/infection during anticipated neutropenic period  Description: INTERVENTIONS  - Monitor WBC  - Administer growth factors as ordered  - Implement neutropenic guidelines  Outcome: Progressing

## 2024-12-21 LAB
ANION GAP SERPL CALC-SCNC: 7 MMOL/L (ref 0–18)
BASOPHILS # BLD AUTO: 0.02 X10(3) UL (ref 0–0.2)
BASOPHILS NFR BLD AUTO: 0.1 %
BUN BLD-MCNC: 13 MG/DL (ref 9–23)
BUN/CREAT SERPL: 25.5 (ref 10–20)
CALCIUM BLD-MCNC: 9.3 MG/DL (ref 8.7–10.4)
CHLORIDE SERPL-SCNC: 105 MMOL/L (ref 98–112)
CO2 SERPL-SCNC: 26 MMOL/L (ref 21–32)
CREAT BLD-MCNC: 0.51 MG/DL
DEPRECATED RDW RBC AUTO: 40.7 FL (ref 35.1–46.3)
EGFRCR SERPLBLD CKD-EPI 2021: 96 ML/MIN/1.73M2 (ref 60–?)
EOSINOPHIL # BLD AUTO: 0 X10(3) UL (ref 0–0.7)
EOSINOPHIL NFR BLD AUTO: 0 %
ERYTHROCYTE [DISTWIDTH] IN BLOOD BY AUTOMATED COUNT: 13 % (ref 11–15)
GLUCOSE BLD-MCNC: 138 MG/DL (ref 70–99)
HCT VFR BLD AUTO: 38.9 %
HGB BLD-MCNC: 13.2 G/DL
IMM GRANULOCYTES # BLD AUTO: 0.09 X10(3) UL (ref 0–1)
IMM GRANULOCYTES NFR BLD: 0.6 %
LYMPHOCYTES # BLD AUTO: 1.17 X10(3) UL (ref 1–4)
LYMPHOCYTES NFR BLD AUTO: 7.5 %
MAGNESIUM SERPL-MCNC: 2.5 MG/DL (ref 1.6–2.6)
MCH RBC QN AUTO: 29.3 PG (ref 26–34)
MCHC RBC AUTO-ENTMCNC: 33.9 G/DL (ref 31–37)
MCV RBC AUTO: 86.4 FL
MONOCYTES # BLD AUTO: 0.64 X10(3) UL (ref 0.1–1)
MONOCYTES NFR BLD AUTO: 4.1 %
NEUTROPHILS # BLD AUTO: 13.72 X10 (3) UL (ref 1.5–7.7)
NEUTROPHILS # BLD AUTO: 13.72 X10(3) UL (ref 1.5–7.7)
NEUTROPHILS NFR BLD AUTO: 87.7 %
OSMOLALITY SERPL CALC.SUM OF ELEC: 288 MOSM/KG (ref 275–295)
PHOSPHATE SERPL-MCNC: 1.9 MG/DL (ref 2.4–5.1)
PLATELET # BLD AUTO: 318 10(3)UL (ref 150–450)
POTASSIUM SERPL-SCNC: 4 MMOL/L (ref 3.5–5.1)
RBC # BLD AUTO: 4.5 X10(6)UL
SODIUM SERPL-SCNC: 138 MMOL/L (ref 136–145)
WBC # BLD AUTO: 15.6 X10(3) UL (ref 4–11)

## 2024-12-21 PROCEDURE — 99233 SBSQ HOSP IP/OBS HIGH 50: CPT | Performed by: INTERNAL MEDICINE

## 2024-12-21 NOTE — PLAN OF CARE
Pt is A&O x4. Breathing on room air. [place] Collar at all times. Up independently. Given Oxy prn for pain. D.C. plan is return home when medically clear. Call light within reach. Safety precaution in place.     Problem: Patient Centered Care  Goal: Patient preferences are identified and integrated in the patient's plan of care  Description: Interventions:  - What would you like us to know as we care for you?   - Provide timely, complete, and accurate information to patient/family  - Incorporate patient and family knowledge, values, beliefs, and cultural backgrounds into the planning and delivery of care  - Encourage patient/family to participate in care and decision-making at the level they choose  - Honor patient and family perspectives and choices  Outcome: Progressing     Problem: Patient/Family Goals  Goal: Patient/Family Long Term Goal  Description: Patient's Long Term Goal: pain level < 2/10    Interventions:  - pain med prn  - See additional Care Plan goals for specific interventions  Outcome: Progressing  Goal: Patient/Family Short Term Goal  Description: Patient's Short Term Goal: Swallow better     Interventions:   - IV steroid  - Pain med prn   - See additional Care Plan goals for specific interventions  Outcome: Progressing     Problem: PAIN - ADULT  Goal: Verbalizes/displays adequate comfort level or patient's stated pain goal  Description: INTERVENTIONS:  - Encourage pt to monitor pain and request assistance  - Assess pain using appropriate pain scale  - Administer analgesics based on type and severity of pain and evaluate response  - Implement non-pharmacological measures as appropriate and evaluate response  - Consider cultural and social influences on pain and pain management  - Manage/alleviate anxiety  - Utilize distraction and/or relaxation techniques  - Monitor for opioid side effects  - Notify MD/LIP if interventions unsuccessful or patient reports new pain  - Anticipate increased pain with  activity and pre-medicate as appropriate  Outcome: Progressing     Problem: RISK FOR INFECTION - ADULT  Goal: Absence of fever/infection during anticipated neutropenic period  Description: INTERVENTIONS  - Monitor WBC  - Administer growth factors as ordered  - Implement neutropenic guidelines  Outcome: Progressing     Problem: SAFETY ADULT - FALL  Goal: Free from fall injury  Description: INTERVENTIONS:  - Assess pt frequently for physical needs  - Identify cognitive and physical deficits and behaviors that affect risk of falls.  - [place] fall precautions as indicated by assessment.  - Educate pt/family on patient safety including physical limitations  - Instruct pt to call for assistance with activity based on assessment  - Modify environment to reduce risk of injury  - Provide assistive devices as appropriate  - Consider OT/PT consult to assist with strengthening/mobility  - Encourage toileting schedule  Outcome: Progressing     Problem: DISCHARGE PLANNING  Goal: Discharge to home or other facility with appropriate resources  Description: INTERVENTIONS:  - Identify barriers to discharge w/pt and caregiver  - Include patient/family/discharge partner in discharge planning  - Arrange for needed discharge resources and transportation as appropriate  - Identify discharge learning needs (meds, wound care, etc)  - Arrange for interpreters to assist at discharge as needed  - Consider post-discharge preferences of patient/family/discharge partner  - Complete POLST form as appropriate  - Assess patient's ability to be responsible for managing their own health  - Refer to Case Management Department for coordinating discharge planning if the patient needs post-hospital services based on physician/LIP order or complex needs related to functional status, cognitive ability or social support system  Outcome: Progressing

## 2024-12-21 NOTE — PROGRESS NOTES
[facility]  part of [facility]    Neurosurgery Progress Note  2024    Alejandra Green Patient Status:  Inpatient    1945 MRN [id number]   Location [facility] 4W/SW/SE Attending Jonathan Kim MD   Hosp Day # 3 PCP Erin Lyles MD     SUBJECTIVE:  Alejandra Green is a(n) 79 year old female postoperative day 3, ACDF.  The patient has some pharyngeal swelling with mild swallowing dysfunction.  This is unchanged.      OBJECTIVE / PHYSICAL EXAM:  Vital Signs:  Blood pressure (!) 161/85, pulse 83, temperature 97.8 °F (36.6 °C), temperature source Temporal, resp. rate 15, height 67\", weight 166 lb 6.4 oz (75.5 kg), SpO2 96%.  Examination is otherwise stable.      Lab Results (last 24 hours):  Recent Results (from the past 24 hours)   CBC With Differential With Platelet    Collection Time: 24  4:49 AM   Result Value Ref Range    WBC 15.6 (H) 4.0 - 11.0 x10(3) uL    RBC 4.50 3.80 - 5.30 x10(6)uL    HGB 13.2 12.0 - 16.0 g/dL    HCT 38.9 35.0 - 48.0 %    MCV 86.4 80.0 - 100.0 fL    MCH 29.3 26.0 - 34.0 pg    MCHC 33.9 31.0 - 37.0 g/dL    RDW-SD 40.7 35.1 - 46.3 fL    RDW 13.0 11.0 - 15.0 %    .0 150.0 - 450.0 10(3)uL    Neutrophil Absolute Prelim 13.72 (H) 1.50 - 7.70 x10 (3) uL    Neutrophil Absolute 13.72 (H) 1.50 - 7.70 x10(3) uL    Lymphocyte Absolute 1.17 1.00 - 4.00 x10(3) uL    Monocyte Absolute 0.64 0.10 - 1.00 x10(3) uL    Eosinophil Absolute 0.00 0.00 - 0.70 x10(3) uL    Basophil Absolute 0.02 0.00 - 0.20 x10(3) uL    Immature Granulocyte Absolute 0.09 0.00 - 1.00 x10(3) uL    Neutrophil % 87.7 %    Lymphocyte % 7.5 %    Monocyte % 4.1 %    Eosinophil % 0.0 %    Basophil % 0.1 %    Immature Granulocyte % 0.6 %   Basic Metabolic Panel (8)    Collection Time: 24  4:49 AM   Result Value Ref Range    Glucose 138 (H) 70 - 99 mg/dL    Sodium 138 136 - 145 mmol/L    Potassium 4.0 3.5 - 5.1 mmol/L    Chloride 105 98 - 112 mmol/L    CO2 26.0  21.0 - 32.0 mmol/L    Anion Gap 7 0 - 18 mmol/L    BUN 13 9 - 23 mg/dL    Creatinine 0.51 (L) 0.55 - 1.02 mg/dL    BUN/CREA Ratio 25.5 (H) 10.0 - 20.0    Calcium, Total 9.3 8.7 - 10.4 mg/dL    Calculated Osmolality 288 275 - 295 mOsm/kg    eGFR-Cr 96 >=60 mL/min/1.73m2   Magnesium    Collection Time: 12/21/24  4:49 AM   Result Value Ref Range    Magnesium 2.5 1.6 - 2.6 mg/dL   Phosphorus    Collection Time: 12/21/24  4:49 AM   Result Value Ref Range    Phosphorus 1.9 (L) 2.4 - 5.1 mg/dL       Review of Imaging  No new imaging    Assessment/Plan:  1.  Cervical spondylosis, status post ACDF.  Overall, the patient is doing well.    Appreciate speech and language pathology input.  Recommend patient follow their recommendations.    The patient is otherwise stable for discharge from neurosurgical standpoint.    Discharge home on Medrol Dosepak.    She may go home later today.  She will see how she is feeling after lunch.        12/21/2024  9:30 AM    This report was dictated using voice recognition technology.  Errors in syntax or recognition may occur, and should not be construed to change the meaning of a sentence.

## 2024-12-21 NOTE — PLAN OF CARE
Monitoring vital signs, stable at this time. Pain medication provided as needed. Encouraging ambulation/ participation for PT/ OT. No acute changes at this moment. Safety and fall precautions in place, bed locked and in lowest position, call light in reach and non skid socks in place. Frequent rounding by nursing staff. Patient still on thickened liquid diet.     Problem: Patient Centered Care  Goal: Patient preferences are identified and integrated in the patient's plan of care  Description: Interventions:  - What would you like us to know as we care for you?   - Provide timely, complete, and accurate information to patient/family  - Incorporate patient and family knowledge, values, beliefs, and cultural backgrounds into the planning and delivery of care  - Encourage patient/family to participate in care and decision-making at the level they choose  - Honor patient and family perspectives and choices  Outcome: Progressing     Problem: Patient/Family Goals  Goal: Patient/Family Long Term Goal  Description: Patient's Long Term Goal:     Interventions:  -   - See additional Care Plan goals for specific interventions  Outcome: Progressing  Goal: Patient/Family Short Term Goal  Description: Patient's Short Term Goal:     Interventions:   -  - See additional Care Plan goals for specific interventions  Outcome: Progressing     Problem: PAIN - ADULT  Goal: Verbalizes/displays adequate comfort level or patient's stated pain goal  Description: INTERVENTIONS:  - Encourage pt to monitor pain and request assistance  - Assess pain using appropriate pain scale  - Administer analgesics based on type and severity of pain and evaluate response  - Implement non-pharmacological measures as appropriate and evaluate response  - Consider cultural and social influences on pain and pain management  - Manage/alleviate anxiety  - Utilize distraction and/or relaxation techniques  - Monitor for opioid side effects  - Notify MD/LIP if  interventions unsuccessful or patient reports new pain  - Anticipate increased pain with activity and pre-medicate as appropriate  Outcome: Progressing     Problem: RISK FOR INFECTION - ADULT  Goal: Absence of fever/infection during anticipated neutropenic period  Description: INTERVENTIONS  - Monitor WBC  - Administer growth factors as ordered  - Implement neutropenic guidelines  Outcome: Progressing     Problem: SAFETY ADULT - FALL  Goal: Free from fall injury  Description: INTERVENTIONS:  - Assess pt frequently for physical needs  - Identify cognitive and physical deficits and behaviors that affect risk of falls.  - [place] fall precautions as indicated by assessment.  - Educate pt/family on patient safety including physical limitations  - Instruct pt to call for assistance with activity based on assessment  - Modify environment to reduce risk of injury  - Provide assistive devices as appropriate  - Consider OT/PT consult to assist with strengthening/mobility  - Encourage toileting schedule  Outcome: Progressing     Problem: DISCHARGE PLANNING  Goal: Discharge to home or other facility with appropriate resources  Description: INTERVENTIONS:  - Identify barriers to discharge w/pt and caregiver  - Include patient/family/discharge partner in discharge planning  - Arrange for needed discharge resources and transportation as appropriate  - Identify discharge learning needs (meds, wound care, etc)  - Arrange for interpreters to assist at discharge as needed  - Consider post-discharge preferences of patient/family/discharge partner  - Complete POLST form as appropriate  - Assess patient's ability to be responsible for managing their own health  - Refer to Case Management Department for coordinating discharge planning if the patient needs post-hospital services based on physician/LIP order or complex needs related to functional status, cognitive ability or social support system  Outcome: Progressing

## 2024-12-21 NOTE — PHYSICAL THERAPY NOTE
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      Patient is requiring stand-by assist as a result of the following impairments: decreased functional strength, decreased endurance/aerobic capacity, pain, and decreased muscular endurance.     Patient continues to function below baseline with bed mobility, transfers, gait, stair negotiation, and standing prolonged periods.  Next session anticipate patient to progress bed mobility, transfers, gait, stair negotiation, and standing prolonged periods.  Physical Therapy will continue to follow patient for duration of hospitalization.    Patient continues to benefit from continued skilled PT services: with no additional skilled services but increased support at home.    PLAN DURING HOSPITALIZATION  Nursing Mobility Recommendation : 1 Assist     PT Treatment Plan: Bed mobility;Body mechanics;Coordination;Endurance;Energy conservation;Patient education;Gait training;Strengthening;Stoop training;Stair training;Transfer training;Balance training  Frequency (Obs): Daily     SUBJECTIVE  Pt was agreeable to therapy session.         OBJECTIVE  Precautions: Spine    WEIGHT BEARING RESTRICTION       PAIN ASSESSMENT   Ratin  Location: cervical  Management Techniques: Activity promotion;Body mechanics;Relaxation;Repositioning    BALANCE  Static Sitting: Good  Dynamic Sitting: Fair +  Static Standing: Fair  Dynamic Standing: Fair    ACTIVITY TOLERANCE                          O2 WALK       AM-PAC '6-Clicks' INPATIENT SHORT FORM - BASIC MOBILITY  How much difficulty does the patient currently have...  Patient  Difficulty: Turning over in bed (including adjusting bedclothes, sheets and blankets)?: A Little   Patient Difficulty: Sitting down on and standing up from a chair with arms (e.g., wheelchair, bedside commode, etc.): A Little   Patient Difficulty: Moving from lying on back to sitting on the side of the bed?: A Little   How much help from another person does the patient currently need...   Help from Another: Moving to and from a bed to a chair (including a wheelchair)?: A Little   Help from Another: Need to walk in hospital room?: A Little   Help from Another: Climbing 3-5 steps with a railing?: A Little     AM-PAC Score:  Raw Score: 18   Approx Degree of Impairment: 46.58%   Standardized Score (AM-PAC Scale): 43.63   CMS Modifier (G-Code): CK    FUNCTIONAL ABILITY STATUS  Functional Mobility/Gait Assessment  Gait Assistance: Supervision  Distance (ft): 1000'  Assistive Device: Rolling walker  Pattern:  (narrow SABRINA)  Stairs:  (-)  How Many Stairs:  (-)  Device:  (-)  Assist:  (-)  Pattern:  (-)  Ascend and Descend :  (-)  Rolling: stand-by assist  Supine to Sit: stand-by assist  Sit to Supine: stand-by assist  Sit to Stand: stand-by assist    Skilled Therapy Provided: Pt is received in the bed and was cleared for therapy session. Pt with her cervical brace on at all times. Pt reviewed and maintained all spinal precautions throughout the session. Pt educated and practiced log rolling in the bed with SBA. Pt is cued for proper technique. Pt is SBA with all sit<>stand transfers with no AD. Pt was able to AMB in the hallway about 1000' with the RW SBA for balance and safety. Pt with very good balance and safety awareness.  Returned pt back to the room and to sitting in the chair with all needs within reach. Pt is on track to dc to home once medically cleared. Reported ot the RN on the status of the pt.     The patient's Approx Degree of Impairment: 46.58% has been calculated based on documentation in the [facility] '6 clicks'  Inpatient Daily Activity Short Form.  Research supports that patients with this level of impairment may benefit from Home with assist.  Final disposition will be made by interdisciplinary medical team.        Patient End of Session: Up in chair;Needs met;Call light within reach;RN aware of session/findings;All patient questions and concerns addressed;Hospital anti-slip socks;Family present    CURRENT GOALS     Goals to be met by: 1/3/25  Patient Goal Patient's self-stated goal is: home   Goal #1 Patient is able to demonstrate supine - sit EOB @ level: supervision     Goal #1   Current Status SBA log rolling    Goal #2 Patient is able to demonstrate transfers Sit to/from Stand at assistance level: supervision with walker - rolling     Goal #2  Current Status SBA with no AD    Goal #3 Patient is able to ambulate 150 feet with assist device: walker - rolling at assistance level: supervision   Goal #3   Current Status 1000' with the RW SBA    Goal #4 Patient will negotiate 3 stairs/one curb w/ assistive device and supervision   Goal #4   Current Status 4 stairs with 1 HR CGA  did stairs last session 12/20   Goal #5 Patient to demonstrate independence with home activity/exercise instructions provided to patient in preparation for discharge.   Goal #5   Current Status IN PROGRESS   Goal #6    Goal #6  Current Status      Therapeutic Activity: 23 minutes

## 2024-12-21 NOTE — SLP NOTE
SPEECH DAILY NOTE - INPATIENT    ASSESSMENT & PLAN   ASSESSMENT  PPE REQUIRED. THIS THERAPIST WORE GLOVES FOR DURATION OF EVALUATION. HANDS WASHED UPON ENTRANCE/EXIT.    SLP f/u for ongoing meal assessment per recommendations of BSE. RN reports pt tolerates diet and medication well with no overt clinical s/s aspiration. Pt reports continued difficulty with swallow function. MD and RN requested SLP to reassess swallow function for potential upgrade.     Pt positioned upright at the side of the bed. Pt afebrile, on room air prior to the start of oral trials. SLP reviewed aspiration precautions and safe swallowing compensatory strategies with the patient. Completed trials of thin liquids, mildly thick liquids via cup, puree, and soft solids. There was functional mastication, ap transfer, and oral clearance. Multiple swallows were produced after each bite/sip. Pt reported continued difficulty with pharyngeal clearance particularly with a elian cracker in pudding. Watery eyes and patient reporting sensation of water \"trying to go the wrong way\" was noted with thin liquids. No further s/s of aspiration noted with mildly thick liquids and puree. SLP educated patient and family on need for continued consistencies: puree and mildly thick liquids given notable difficulty and s/s of aspiration. OK for ICE CHIPS. Patient and family v/u.     SLP to f/u with meal assessment x2, monitor  CXR, and VFSS if any overt CSA and/or decline in CXR. RN was alerted with results and recommendations.     NO RECENT CXR     Diet Recommendations - Solids: Puree  Diet Recommendations - Liquids: Nectar thick liquids/ Mildly thick    Compensatory Strategies Recommended: Multiple swallows  Aspiration Precautions: Upright position;Small bites;Small sips;Slow rate  Medication Administration Recommendations: Crushed in puree    Patient Experiencing Pain: No                Treatment Plan  Treatment Plan/Recommendations: Aspiration  precautions    Interdisciplinary Communication: Discussed with RN            GOALS  Goal #1 The patient will tolerate puree consistency and mildly thick liquids without overt signs or symptoms of aspiration with 100 % accuracy over 1-2 session(s).  In Progress   Goal #2 The patient/family/caregiver will demonstrate understanding and implementation of aspiration precautions and swallow strategies independently over 1-2 session(s).    In Progress   Goal #3 The patient will tolerate trial upgrade of soft/hard solid consistency and thin liquids without overt signs or symptoms of aspiration with 100 % accuracy over 1-2 session(s).  In Progress   Goal #4 The patient will utilize compensatory strategies as outlined by  BSSE (clinical evaluation) including Slow rate, Small bites, Small sips, Multiple swallows, Alternate liquids/solids, No straws, Upright 90 degrees, Upright 90 degrees 30 mins after meal, Liquids via tsp amount only, Eliminate distractions with PRN assistance 100 % of the time across 2 sessions.    In Progress     FOLLOW UP  Follow Up Needed (Documentation Required): Yes  SLP Follow-up Date: 12/22/24  Duration: 1 week    Session: 1    If you have any questions, please contact Grisel Castillo, SLP Grisel Castillo, [facility]  [phone]

## 2024-12-21 NOTE — PROGRESS NOTES
Progress Note     Alejandra Green Patient Status:  Inpatient    1945 MRN [id number]   Location [facility] 4W/SW/SE Attending Jonathan Kim MD   Hosp Day # 3 PCP Erin Lyles MD     Chief Complaint: No chief complaint on file.        Subjective:   S: Patient seen and examined, chart reviewed.   Speech seen, only thickened liquids.       Review of Systems:   10 point ROS completed and was negative, except for pertinent positive and negatives stated in subjective.                Objective:   Vital signs:  Temp:  [97.8 °F (36.6 °C)-99.3 °F (37.4 °C)] 97.8 °F (36.6 °C)  Pulse:  [71-87] 83  Resp:  [15-18] 15  BP: (149-161)/(82-89) 161/85  SpO2:  [94 %-96 %] 96 %    Wt Readings from Last 6 Encounters:   24 166 lb 6.4 oz (75.5 kg)   24 161 lb (73 kg)   10/25/24 162 lb (73.5 kg)   10/24/24 162 lb (73.5 kg)   10/21/24 162 lb 3.2 oz (73.6 kg)   10/10/24 158 lb 12.8 oz (72 kg)       Physical Exam:    General: No acute distress.   Respiratory: Clear to auscultation bilaterally. No wheezes. No rhonchi.  Cardiovascular: S1, S2. Regular rate and rhythm. No murmurs, rubs or gallops.   Abdomen: Soft, nontender, nondistended.  Positive bowel sounds. No rebound or guarding.  Neurologic: No focal neurological deficits.   Musculoskeletal: Moves all extremities.  Extremities: No edema.    Results:   Diagnostic Data:      Labs:    Labs Last 24 Hours:   BMP     CBC    Other     Na 138 Cl 105 BUN 13 Glu 138   Hb 13.2   PTT - Procal -   K 4.0 CO2 26.0 Cr 0.51   WBC 15.6 >< .0  INR - CRP -   Renal Lytes Endo    Hct 38.9   Trop - D dim -   eGFR - Ca 9.3 POC Gluc  -    LFT   pBNP - Lactic -   eGFR AA - PO4 1.9 A1c -   AST - APk - Prot -  LDL -      Recent Labs   Lab 24  0449   RBC 4.50   HGB 13.2   HCT 38.9   MCV 86.4   MCH 29.3   MCHC 33.9   RDW 13.0   NEPRELIM 13.72*   WBC 15.6*   .0       Lab Results   Component Value Date    INR 0.99 2024       Recent Labs   Lab  12/21/24  0449   *   BUN 13   CREATSERUM 0.51*   CA 9.3      K 4.0      CO2 26.0       No results for input(s): \"ZAYRA\", \"LIP\" in the last 168 hours.    Recent Labs   Lab 12/21/24  0449   MG 2.5   PHOS 1.9*       No results for input(s): \"URINE\", \"CULTI\", \"BLDSMR\" in the last 168 hours.      XR SOFT TISSUE NECK (CPT=70360)    Result Date: 12/19/2024  CONCLUSION:   Moderate prevertebral postoperative swelling.  No radiopaque foreign body.  Blunting of the aryepiglottic folds within the subglottic airway, likely related to local postoperative edema.      Dictated by (CST): Eve Fabian MD on 12/19/2024 at 8:44 PM     Finalized by (CST): Eve Fabian MD on 12/19/2024 at 8:46 PM               Pro-Calcitonin  No results for input(s): \"PCT\" in the last 168 hours.    Cardiac  No results for input(s): \"TROP\", \"PBNP\" in the last 168 hours.    Imaging: Imaging data reviewed in Epic.    XR SOFT TISSUE NECK (CPT=70360)    Result Date: 12/19/2024  CONCLUSION:   Moderate prevertebral postoperative swelling.  No radiopaque foreign body.  Blunting of the aryepiglottic folds within the subglottic airway, likely related to local postoperative edema.      Dictated by (CST): Eve Fabian MD on 12/19/2024 at 8:44 PM     Finalized by (CST): Eve Fabian MD on 12/19/2024 at 8:46 PM              Medications:    sodium phosphate  15 mmol Intravenous Once    dexamethasone  4 mg Intravenous Q6H    gabapentin  300 mg Oral Nightly    levothyroxine  75 mcg Oral Before breakfast    rosuvastatin  10 mg Oral Daily    sennosides  17.2 mg Oral Nightly    docusate sodium  100 mg Oral BID    enoxaparin  40 mg Subcutaneous Daily    acetaminophen  1,000 mg Intravenous Q6H    methocarbamol  500 mg Oral q6h    lisinopril  5 mg Oral Daily       Assessment & Plan:   ASSESSMENT / PLAN:          Cervical spinal stenosis status post transcervical exposure of prior C4-C5 fusion, exploration of fusion mass, C3-C4 and C5-C6  anterior cervical discectomy and fusion using 2 separate anterior fixation plates.  12/18/24;pod#1Pain control.  Neuro checks.  Monitor surgical wound and drain.  DVT prophylaxis.  Physical and occupational therapy; throat swelling with difficulty swallowing; decadron started; poss dc tomorrow  -Seen by speech today recommending puréed diet.  Watch for her improvement over the next 24 hours.    2.       Essential hypertension.  Continue home medications and monitor.    3.       Hyperlipidemia.  Continue home medications.    4.       Hypothyroidism.  Continue home medications.     Patient will require inpatient services that will reasonably be expected to span two midnight's based on the clinical documentation in H+P.   Based on patients current state of illness, I anticipate that, after discharge, patient will require TBD.  Complex mdm;coordinating care with nurse and counseling pt and with her permission her  in room about   Poss dc tomorrow if throat better    Plan of care discussed with patient her  and nurse    Jonathan Kim MD, FAAP, FACP  [facility] Hospitalist  I respond to Epic Chat and AMS Connect

## 2024-12-22 ENCOUNTER — APPOINTMENT (OUTPATIENT)
Dept: CV DIAGNOSTICS | Facility: HOSPITAL | Age: 79
End: 2024-12-22
Attending: INTERNAL MEDICINE
Payer: MEDICARE

## 2024-12-22 LAB
ANION GAP SERPL CALC-SCNC: 8 MMOL/L (ref 0–18)
BASOPHILS # BLD AUTO: 0.01 X10(3) UL (ref 0–0.2)
BASOPHILS NFR BLD AUTO: 0.1 %
BUN BLD-MCNC: 19 MG/DL (ref 9–23)
BUN/CREAT SERPL: 34.5 (ref 10–20)
CALCIUM BLD-MCNC: 9.4 MG/DL (ref 8.7–10.4)
CHLORIDE SERPL-SCNC: 106 MMOL/L (ref 98–112)
CHOLEST SERPL-MCNC: 134 MG/DL (ref ?–200)
CO2 SERPL-SCNC: 24 MMOL/L (ref 21–32)
CREAT BLD-MCNC: 0.55 MG/DL
DEPRECATED RDW RBC AUTO: 42.5 FL (ref 35.1–46.3)
EGFRCR SERPLBLD CKD-EPI 2021: 94 ML/MIN/1.73M2 (ref 60–?)
EOSINOPHIL # BLD AUTO: 0 X10(3) UL (ref 0–0.7)
EOSINOPHIL NFR BLD AUTO: 0 %
ERYTHROCYTE [DISTWIDTH] IN BLOOD BY AUTOMATED COUNT: 13.2 % (ref 11–15)
EST. AVERAGE GLUCOSE BLD GHB EST-MCNC: 117 MG/DL (ref 68–126)
GLUCOSE BLD-MCNC: 153 MG/DL (ref 70–99)
HBA1C MFR BLD: 5.7 % (ref ?–5.7)
HCT VFR BLD AUTO: 39.1 %
HDLC SERPL-MCNC: 50 MG/DL (ref 40–59)
HGB BLD-MCNC: 13.3 G/DL
IMM GRANULOCYTES # BLD AUTO: 0.13 X10(3) UL (ref 0–1)
IMM GRANULOCYTES NFR BLD: 0.8 %
LDLC SERPL CALC-MCNC: 64 MG/DL (ref ?–100)
LYMPHOCYTES # BLD AUTO: 1.18 X10(3) UL (ref 1–4)
LYMPHOCYTES NFR BLD AUTO: 7.3 %
MAGNESIUM SERPL-MCNC: 2.2 MG/DL (ref 1.6–2.6)
MCH RBC QN AUTO: 29.9 PG (ref 26–34)
MCHC RBC AUTO-ENTMCNC: 34 G/DL (ref 31–37)
MCV RBC AUTO: 87.9 FL
MONOCYTES # BLD AUTO: 0.55 X10(3) UL (ref 0.1–1)
MONOCYTES NFR BLD AUTO: 3.4 %
NEUTROPHILS # BLD AUTO: 14.2 X10 (3) UL (ref 1.5–7.7)
NEUTROPHILS # BLD AUTO: 14.2 X10(3) UL (ref 1.5–7.7)
NEUTROPHILS NFR BLD AUTO: 88.4 %
NONHDLC SERPL-MCNC: 84 MG/DL (ref ?–130)
OSMOLALITY SERPL CALC.SUM OF ELEC: 291 MOSM/KG (ref 275–295)
PHOSPHATE SERPL-MCNC: 2.9 MG/DL (ref 2.4–5.1)
PLATELET # BLD AUTO: 382 10(3)UL (ref 150–450)
POTASSIUM SERPL-SCNC: 3.7 MMOL/L (ref 3.5–5.1)
POTASSIUM SERPL-SCNC: 3.8 MMOL/L (ref 3.5–5.1)
Q-T INTERVAL: 274 MS
Q-T INTERVAL: 292 MS
QRS DURATION: 82 MS
QRS DURATION: 84 MS
QTC CALCULATION (BEZET): 398 MS
QTC CALCULATION (BEZET): 456 MS
R AXIS: 51 DEGREES
R AXIS: 53 DEGREES
RBC # BLD AUTO: 4.45 X10(6)UL
SODIUM SERPL-SCNC: 138 MMOL/L (ref 136–145)
T AXIS: -33 DEGREES
T AXIS: 3 DEGREES
TRIGL SERPL-MCNC: 110 MG/DL (ref 30–149)
TROPONIN I SERPL HS-MCNC: 25 NG/L
TROPONIN I SERPL HS-MCNC: 43 NG/L
TSI SER-ACNC: 0.16 UIU/ML (ref 0.55–4.78)
VENTRICULAR RATE: 127 BPM
VENTRICULAR RATE: 147 BPM
VLDLC SERPL CALC-MCNC: 16 MG/DL (ref 0–30)
WBC # BLD AUTO: 16.1 X10(3) UL (ref 4–11)

## 2024-12-22 PROCEDURE — 99233 SBSQ HOSP IP/OBS HIGH 50: CPT | Performed by: INTERNAL MEDICINE

## 2024-12-22 RX ORDER — METOPROLOL TARTRATE 1 MG/ML
10 INJECTION, SOLUTION INTRAVENOUS ONCE
Status: DISCONTINUED | OUTPATIENT
Start: 2024-12-22 | End: 2024-12-22

## 2024-12-22 RX ORDER — AMIODARONE HYDROCHLORIDE 200 MG/1
400 TABLET ORAL 2 TIMES DAILY WITH MEALS
Status: DISCONTINUED | OUTPATIENT
Start: 2024-12-22 | End: 2024-12-22

## 2024-12-22 RX ORDER — POTASSIUM CHLORIDE 1.5 G/1.58G
20 POWDER, FOR SOLUTION ORAL ONCE
Status: COMPLETED | OUTPATIENT
Start: 2024-12-22 | End: 2024-12-22

## 2024-12-22 RX ORDER — METOPROLOL TARTRATE 1 MG/ML
5 INJECTION, SOLUTION INTRAVENOUS
Status: DISCONTINUED | OUTPATIENT
Start: 2024-12-22 | End: 2024-12-23

## 2024-12-22 RX ORDER — METOPROLOL TARTRATE 25 MG/1
25 TABLET, FILM COATED ORAL
Status: DISCONTINUED | OUTPATIENT
Start: 2024-12-22 | End: 2024-12-22

## 2024-12-22 RX ORDER — SODIUM CHLORIDE 9 MG/ML
INJECTION, SOLUTION INTRAVENOUS CONTINUOUS
Status: ACTIVE | OUTPATIENT
Start: 2024-12-22 | End: 2024-12-23

## 2024-12-22 RX ORDER — METOPROLOL TARTRATE 50 MG
50 TABLET ORAL
Status: DISCONTINUED | OUTPATIENT
Start: 2024-12-22 | End: 2024-12-22

## 2024-12-22 NOTE — DIETARY NOTE
Dietitian Note:     Received Consult: Please provide recommendations for diet with respect to SLP consistency recommendations.     Patient had surgery on 12/18/24. Cervical stenosis of spine s/p surgery. Cervical collar in place.     Patient is receiving a Puree Diet with [place] Thick Liquids per Speech Therapist Recommendation.     Met with patient and spouse today per request. Provided handouts re: Pureed Foods and [place] Thick Liquids. Briefly discussed handouts. Patient reports she likes to cook and was told previously that she may have to be on a different consistency diet. Patient expressing that this diet will only be temporary.  States she is hungry and wants additional foods with protein.   Ordered Mighty Shakes (as snacks) as patient is requesting \"protein\" and agreed to trial.     Reports the Magic Cup was \"ok.\"      Agreed with plan for ONS between meals to add protein.     Answered questions. Provided name and phone number as needed.     Priyanka Luu RDN, LDN, CDE   Clinical Nutrition  Ext [phone]

## 2024-12-22 NOTE — PLAN OF CARE
Patient is from home w/ spouse. A&Ox4. Room air. On tele. Amiodarone drip started. Patient is a self in room. Spouse at bedside during rounds. Bed in lowest position and locked. Call light in hand. Personal belongings w/in reach.     Problem: Patient Centered Care  Goal: Patient preferences are identified and integrated in the patient's plan of care  Description: Interventions:  - What would you like us to know as we care for you? From home w/ spouse  - Provide timely, complete, and accurate information to patient/family  - Incorporate patient and family knowledge, values, beliefs, and cultural backgrounds into the planning and delivery of care  - Encourage patient/family to participate in care and decision-making at the level they choose  - Honor patient and family perspectives and choices  Outcome: Progressing     Problem: Patient/Family Goals  Goal: Patient/Family Long Term Goal  Description: Patient's Long Term Goal: To feel better    Interventions:  - Continue medication administration as ordered  - See additional Care Plan goals for specific interventions  Outcome: Progressing  Goal: Patient/Family Short Term Goal  Description: Patient's Short Term Goal: To be discharged    Interventions:   - Assist patient to all tests and procedures  - See additional Care Plan goals for specific interventions  Outcome: Progressing     Problem: PAIN - ADULT  Goal: Verbalizes/displays adequate comfort level or patient's stated pain goal  Description: INTERVENTIONS:  - Encourage pt to monitor pain and request assistance  - Assess pain using appropriate pain scale  - Administer analgesics based on type and severity of pain and evaluate response  - Implement non-pharmacological measures as appropriate and evaluate response  - Consider cultural and social influences on pain and pain management  - Manage/alleviate anxiety  - Utilize distraction and/or relaxation techniques  - Monitor for opioid side effects  - Notify MD/LIP if  interventions unsuccessful or patient reports new pain  - Anticipate increased pain with activity and pre-medicate as appropriate  Outcome: Progressing     Problem: RISK FOR INFECTION - ADULT  Goal: Absence of fever/infection during anticipated neutropenic period  Description: INTERVENTIONS  - Monitor WBC  - Administer growth factors as ordered  - Implement neutropenic guidelines  Outcome: Progressing     Problem: SAFETY ADULT - FALL  Goal: Free from fall injury  Description: INTERVENTIONS:  - Assess pt frequently for physical needs  - Identify cognitive and physical deficits and behaviors that affect risk of falls.  - [place] fall precautions as indicated by assessment.  - Educate pt/family on patient safety including physical limitations  - Instruct pt to call for assistance with activity based on assessment  - Modify environment to reduce risk of injury  - Provide assistive devices as appropriate  - Consider OT/PT consult to assist with strengthening/mobility  - Encourage toileting schedule  Outcome: Progressing     Problem: DISCHARGE PLANNING  Goal: Discharge to home or other facility with appropriate resources  Description: INTERVENTIONS:  - Identify barriers to discharge w/pt and caregiver  - Include patient/family/discharge partner in discharge planning  - Arrange for needed discharge resources and transportation as appropriate  - Identify discharge learning needs (meds, wound care, etc)  - Arrange for interpreters to assist at discharge as needed  - Consider post-discharge preferences of patient/family/discharge partner  - Complete POLST form as appropriate  - Assess patient's ability to be responsible for managing their own health  - Refer to Case Management Department for coordinating discharge planning if the patient needs post-hospital services based on physician/LIP order or complex needs related to functional status, cognitive ability or social support system  Outcome: Progressing

## 2024-12-22 NOTE — PLAN OF CARE
Pt is A&O x4. Breathing on room air. [place] Collar at all times. Up independently. Voiding freely. Given Oxy prn for pain. D.C. plan is return home when medically clear. Call light within reach. Safety precaution in place.      Problem: Patient Centered Care  Goal: Patient preferences are identified and integrated in the patient's plan of care  Description: Interventions:  - What would you like us to know as we care for you?   - Provide timely, complete, and accurate information to patient/family  - Incorporate patient and family knowledge, values, beliefs, and cultural backgrounds into the planning and delivery of care  - Encourage patient/family to participate in care and decision-making at the level they choose  - Honor patient and family perspectives and choices  Outcome: Progressing     Problem: Patient/Family Goals  Goal: Patient/Family Long Term Goal  Description: Patient's Long Term Goal: No pain    Interventions:  - pain med prn  - See additional Care Plan goals for specific interventions  Outcome: Progressing  Goal: Patient/Family Short Term Goal  Description: Patient's Short Term Goal: able to tolerate regulate diet    Interventions:   - speech duc  - advance diet as tolerate  - IV steroids  - See additional Care Plan goals for specific interventions  Outcome: Progressing     Problem: PAIN - ADULT  Goal: Verbalizes/displays adequate comfort level or patient's stated pain goal  Description: INTERVENTIONS:  - Encourage pt to monitor pain and request assistance  - Assess pain using appropriate pain scale  - Administer analgesics based on type and severity of pain and evaluate response  - Implement non-pharmacological measures as appropriate and evaluate response  - Consider cultural and social influences on pain and pain management  - Manage/alleviate anxiety  - Utilize distraction and/or relaxation techniques  - Monitor for opioid side effects  - Notify MD/LIP if interventions unsuccessful or patient reports  new pain  - Anticipate increased pain with activity and pre-medicate as appropriate  Outcome: Progressing     Problem: RISK FOR INFECTION - ADULT  Goal: Absence of fever/infection during anticipated neutropenic period  Description: INTERVENTIONS  - Monitor WBC  - Administer growth factors as ordered  - Implement neutropenic guidelines  Outcome: Progressing     Problem: SAFETY ADULT - FALL  Goal: Free from fall injury  Description: INTERVENTIONS:  - Assess pt frequently for physical needs  - Identify cognitive and physical deficits and behaviors that affect risk of falls.  - [place] fall precautions as indicated by assessment.  - Educate pt/family on patient safety including physical limitations  - Instruct pt to call for assistance with activity based on assessment  - Modify environment to reduce risk of injury  - Provide assistive devices as appropriate  - Consider OT/PT consult to assist with strengthening/mobility  - Encourage toileting schedule  Outcome: Progressing     Problem: DISCHARGE PLANNING  Goal: Discharge to home or other facility with appropriate resources  Description: INTERVENTIONS:  - Identify barriers to discharge w/pt and caregiver  - Include patient/family/discharge partner in discharge planning  - Arrange for needed discharge resources and transportation as appropriate  - Identify discharge learning needs (meds, wound care, etc)  - Arrange for interpreters to assist at discharge as needed  - Consider post-discharge preferences of patient/family/discharge partner  - Complete POLST form as appropriate  - Assess patient's ability to be responsible for managing their own health  - Refer to Case Management Department for coordinating discharge planning if the patient needs post-hospital services based on physician/LIP order or complex needs related to functional status, cognitive ability or social support system  Outcome: Progressing

## 2024-12-22 NOTE — CONSULTS
Cardiology Consult Note    Alejandra Green Patient Status:  Inpatient    1945 MRN [id number]   Location [facility] 4W/SW/SE Attending Jonathan Kim MD   Hosp Day # 4 PCP Erin Lyles MD     \[facility] [place] [facility]\"".  Alejandra Green is a 79 year old female with a history of hypertension, hyperlipidemia admitted  after spinal surgery.  Patient underwent C4/5 fusion, exploration of mass, C3/4 and C5/6 ACDF .  Patient admitted \ for postoperative monitoring.  Patient was recovering as expected, this morning developed narrow complex tachycardia and EKG confirms rapid atrial fibrillation with heart rate 147, remainder of vitals okay.  Labs with leukocytosis otherwise normal.    Pt is followed by her cardiologist Dr Avendaño at [place]. She does not recollect having rhythm issues in the past.        --------------------------------------------------------------------------------------------------------------------------------  ROS 10 systems reviewed, pertinent findings above.  ROS    History:  Past Medical History:    Back problem    Disorder of thyroid    Essential hypertension    High blood pressure    High cholesterol    Hyperlipidemia    Osteoarthritis    PONV (postoperative nausea and vomiting)    Visual impairment     Past Surgical History:   Procedure Laterality Date    Back surgery      Cholecystectomy      Cystoscopy,insert ureteral stent      Hernia surgery      Hysterectomy      Knee replacement surgery Bilateral     X2    Spine surgery procedure unlisted      Trigger finger release N/A     X4     Family History   Problem Relation Age of Onset    Heart Disease Father     Cancer Mother     Heart Attack Sister       reports that she has never smoked. She has never used smokeless tobacco. She reports that she does not drink alcohol and does not use drugs.    Objective:   Temp: 98 °F (36.7 °C)  Pulse: 89  Resp: 16  BP: 122/83    Intake/Output:     Intake/Output Summary (Last 24 hours)  at 12/22/2024 1132  Last data filed at 12/21/2024 2100  Gross per 24 hour   Intake --   Output 1 ml   Net -1 ml       Physical Exam:     General: AOx3, no acute distress  HEENT: Normocephalic, anicteric sclera, neck supple.  Neck: No JVD, carotids 2+, no bruits.  Cardiac: Irregularly rate and rhythm. S1, S2 normal. No murmur, pericardial rub, S3.  Lungs: Clear without wheezes, rales, rhonchi or dullness.  Normal excursions and effort.  Abdomen: Soft, non-tender. BS-present.  Extremities: Without clubbing, cyanosis or edema.  Peripheral pulses are 2+.  Neurologic: Non-focal  Skin: Warm and dry.       Assessment:    Atrial fibrillation with RVR, new onset  Status post C-spine surgery 12/18/2024  History hypertension  History of hyperlipidemia      Plan:  Not optimal candidate for anticoagulation given recent spinal surgery  Pt started on amio and lopressor oral this monring  Recommend transfer to 3rd floor to start IV amio  Check echo  Hold off on AC unless sustained afib and if deemed safe from Neurosug standpoint    Thank you for allowing me to take part in the care of Alejandra Rodriguezsoosulema Green. Please call with any questions of concerns.      Level of care: C5    Dr. Nehemias Moctezuma,   December 22, 2024  11:32 AM

## 2024-12-22 NOTE — PROGRESS NOTES
[facility]  part of [facility]    Neurosurgery Progress Note  2024    Alejandra Green Patient Status:  Inpatient    1945 MRN [id number]   Location [facility] 4W/SW/SE Attending Jonathan Kim MD   Hosp Day # 4 PCP Erin Lyles MD     SUBJECTIVE:  Alejandra Green is a(n) 79 year old female postop day 4, ACDF.  The patient says she is hungry.  She has persistent swallowing dysfunction.  SLP recommendations appreciated.      OBJECTIVE / PHYSICAL EXAM:  Vital Signs:  Blood pressure (!) 168/94, pulse 89, temperature 98 °F (36.7 °C), temperature source Oral, resp. rate 16, height 67\", weight 166 lb 6.4 oz (75.5 kg), SpO2 96%.  Examination is stable.      Lab Results (last 24 hours):  Recent Results (from the past 24 hours)   Phosphorus    Collection Time: 24  5:03 AM   Result Value Ref Range    Phosphorus 2.9 2.4 - 5.1 mg/dL   RAINBOW DRAW LAVENDER    Collection Time: 24  5:03 AM   Result Value Ref Range    Hold [place] Auto Resulted    Basic Metabolic Panel (8)    Collection Time: 24  5:03 AM   Result Value Ref Range    Glucose 153 (H) 70 - 99 mg/dL    Sodium 138 136 - 145 mmol/L    Potassium 3.7 3.5 - 5.1 mmol/L    Chloride 106 98 - 112 mmol/L    CO2 24.0 21.0 - 32.0 mmol/L    Anion Gap 8 0 - 18 mmol/L    BUN 19 9 - 23 mg/dL    Creatinine 0.55 0.55 - 1.02 mg/dL    BUN/CREA Ratio 34.5 (H) 10.0 - 20.0    Calcium, Total 9.4 8.7 - 10.4 mg/dL    Calculated Osmolality 291 275 - 295 mOsm/kg    eGFR-Cr 94 >=60 mL/min/1.73m2   CBC With Differential With Platelet    Collection Time: 24  5:03 AM   Result Value Ref Range    WBC 16.1 (H) 4.0 - 11.0 x10(3) uL    RBC 4.45 3.80 - 5.30 x10(6)uL    HGB 13.3 12.0 - 16.0 g/dL    HCT 39.1 35.0 - 48.0 %    MCV 87.9 80.0 - 100.0 fL    MCH 29.9 26.0 - 34.0 pg    MCHC 34.0 31.0 - 37.0 g/dL    RDW-SD 42.5 35.1 - 46.3 fL    RDW 13.2 11.0 - 15.0 %    .0 150.0 - 450.0 10(3)uL    Neutrophil Absolute  Prelim 14.20 (H) 1.50 - 7.70 x10 (3) uL    Neutrophil Absolute 14.20 (H) 1.50 - 7.70 x10(3) uL    Lymphocyte Absolute 1.18 1.00 - 4.00 x10(3) uL    Monocyte Absolute 0.55 0.10 - 1.00 x10(3) uL    Eosinophil Absolute 0.00 0.00 - 0.70 x10(3) uL    Basophil Absolute 0.01 0.00 - 0.20 x10(3) uL    Immature Granulocyte Absolute 0.13 0.00 - 1.00 x10(3) uL    Neutrophil % 88.4 %    Lymphocyte % 7.3 %    Monocyte % 3.4 %    Eosinophil % 0.0 %    Basophil % 0.1 %    Immature Granulocyte % 0.8 %       Review of Imaging  No new imaging    Assessment/Plan:  1.  Cervical spondylosis, status post ACDF.    Patient continues to do reasonably well.  For neurosurgical standpoint, she is stable for discharge.  Speech recommendation should be followed.    The patient may need some further recommendations from nutrition or speech therapy, regarding what kind of foods she can eat which will have a balanced caloric value.    Otherwise, she may go home, where she can prepare food for herself.        12/22/2024  9:01 AM    This report was dictated using voice recognition technology.  Errors in syntax or recognition may occur, and should not be construed to change the meaning of a sentence.

## 2024-12-22 NOTE — PHYSICAL THERAPY NOTE
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      Patient is requiring stand-by assist as a result of the following impairments: decreased functional strength, decreased endurance/aerobic capacity, pain, and decreased muscular endurance.     Patient continues to function below baseline with bed mobility, transfers, gait, stair negotiation, and standing prolonged periods.  Next session anticipate patient to progress bed mobility, transfers, gait, stair negotiation, and standing prolonged periods.  Physical Therapy will continue to follow patient for duration of hospitalization.    Patient continues to benefit from continued skilled PT services: with no additional skilled services but increased support at home.    PLAN DURING HOSPITALIZATION  Nursing Mobility Recommendation : 1 Assist     PT Treatment Plan: Bed mobility;Body mechanics;Coordination;Endurance;Energy conservation;Patient education;Gait training;Strengthening;Stoop training;Stair training;Transfer training;Balance training  Frequency (Obs): Daily     SUBJECTIVE  Pt was agreeable to therapy session.         OBJECTIVE  Precautions: Spine    WEIGHT BEARING RESTRICTION       PAIN ASSESSMENT   Rating:  (did not rate)  Location: cervical  Management Techniques: Activity promotion;Body mechanics;Relaxation;Repositioning    BALANCE  Static Sitting: Good  Dynamic Sitting: Good  Static Standing: Not tested  Dynamic Standing: Not tested    ACTIVITY TOLERANCE                          O2 WALK       AM-PAC '6-Clicks' INPATIENT SHORT FORM - BASIC MOBILITY  How much difficulty does the patient currently  have...  Patient Difficulty: Turning over in bed (including adjusting bedclothes, sheets and blankets)?: A Little   Patient Difficulty: Sitting down on and standing up from a chair with arms (e.g., wheelchair, bedside commode, etc.): A Little   Patient Difficulty: Moving from lying on back to sitting on the side of the bed?: A Little   How much help from another person does the patient currently need...   Help from Another: Moving to and from a bed to a chair (including a wheelchair)?: A Little   Help from Another: Need to walk in hospital room?: A Little   Help from Another: Climbing 3-5 steps with a railing?: A Little     AM-PAC Score:  Raw Score: 18   Approx Degree of Impairment: 46.58%   Standardized Score (AM-PAC Scale): 43.63   CMS Modifier (G-Code): CK    FUNCTIONAL ABILITY STATUS  Functional Mobility/Gait Assessment  Gait Assistance: Not tested  Distance (ft):  (-)  Assistive Device: Rolling walker  Pattern:  (narrow SABRINA)  Stairs:  (-)  How Many Stairs:  (-)  Device:  (-)  Assist:  (-)  Pattern:  (-)  Ascend and Descend :  (-)  Rolling: stand-by assist  Supine to Sit: stand-by assist  Sit to Supine: stand-by assist  Sit to Stand:  NT    Skilled Therapy Provided: Pt is received in the bed and was cleared for therapy session. Pt has been IND up [place] in her room and AMB in the hallways with her . Pt just returned form AMB in the hallway and declined during PT. Pt did work on and was educated on bed mobility and repositioning in the bed. Pt also educated on some home ADL equipment for home. Pt is left in the bed with all needs within reach. Reported ot the RN on the status of the pt.     The patient's Approx Degree of Impairment: 46.58% has been calculated based on documentation in the [facility] '6 clicks' Inpatient Daily Activity Short Form.  Research supports that patients with this level of impairment may benefit from Home with assist.  Final disposition will be made by interdisciplinary medical  team.      Patient End of Session: In bed;Needs met;Call light within reach;RN aware of session/findings;All patient questions and concerns addressed;Bracing education provided per handout;Hospital anti-slip socks;Family present    CURRENT GOALS   Goals to be met by: 1/3/25  Patient Goal Patient's self-stated goal is: home   Goal #1 Patient is able to demonstrate supine - sit EOB @ level: supervision     Goal #1   Current Status SBA log rolling and positioning    Goal #2 Patient is able to demonstrate transfers Sit to/from Stand at assistance level: supervision with walker - rolling     Goal #2  Current Status NT   Goal #3 Patient is able to ambulate 150 feet with assist device: walker - rolling at assistance level: supervision   Goal #3   Current Status NT   Goal #4 Patient will negotiate 3 stairs/one curb w/ assistive device and supervision   Goal #4   Current Status NT   Goal #5 Patient to demonstrate independence with home activity/exercise instructions provided to patient in preparation for discharge.   Goal #5   Current Status IN PROGRESS   Goal #6    Goal #6  Current Status        Therapeutic Activity: 15 minutes

## 2024-12-22 NOTE — PROGRESS NOTES
Progress Note     Alejandra Green Patient Status:  Inpatient    1945 MRN [id number]   Location [facility] 4W/SW/SE Attending Jonathan Kim MD   Hosp Day # 4 PCP Erin Lyles MD     Chief Complaint: No chief complaint on file.        Subjective:   S: Patient seen and examined, chart reviewed.   Arrived to find patient anxious and c/o palpitations. She is in afib and EKG confirmed. Got cards consult and moved to tele and started amio      Review of Systems:   10 point ROS completed and was negative, except for pertinent positive and negatives stated in subjective.                Objective:   Vital signs:  Temp:  [97.8 °F (36.6 °C)-98.3 °F (36.8 °C)] 98 °F (36.7 °C)  Pulse:  [77-95] 89  Resp:  [15-16] 16  BP: (122-168)/(64-94) 122/83  SpO2:  [95 %-96 %] 96 %    Wt Readings from Last 6 Encounters:   24 166 lb 6.4 oz (75.5 kg)   24 161 lb (73 kg)   10/25/24 162 lb (73.5 kg)   10/24/24 162 lb (73.5 kg)   10/21/24 162 lb 3.2 oz (73.6 kg)   10/10/24 158 lb 12.8 oz (72 kg)       Physical Exam:    General: No acute distress.   Respiratory: Clear to auscultation bilaterally. No wheezes. No rhonchi.  Cardiovascular: Irreg/Irreg, murmur soft systolic  Abdomen: Soft, nontender, nondistended.  Positive bowel sounds. No rebound or guarding.  Neurologic: No focal neurological deficits.   Musculoskeletal: Moves all extremities.  Extremities: No edema.    Results:   Diagnostic Data:      Labs:    Labs Last 24 Hours:   BMP     CBC    Other     Na 138 Cl 106 BUN 19 Glu 153   Hb 13.3   PTT - Procal -   K 3.7 CO2 24.0 Cr 0.55   WBC 16.1 >< .0  INR - CRP -   Renal Lytes Endo    Hct 39.1   Trop - D dim -   eGFR - Ca 9.4 POC Gluc  -    LFT   pBNP - Lactic -   eGFR AA - PO4 2.9 A1c -   AST - APk - Prot -  LDL -      Recent Labs   Lab 24  0449 24  0503   RBC 4.50 4.45   HGB 13.2 13.3   HCT 38.9 39.1   MCV 86.4 87.9   MCH 29.3 29.9   MCHC 33.9 34.0   RDW 13.0 13.2   NEPRELIM 13.72*  14.20*   WBC 15.6* 16.1*   .0 382.0       Lab Results   Component Value Date    INR 0.99 11/11/2024       Recent Labs   Lab 12/21/24 0449 12/22/24  0503   * 153*   BUN 13 19   CREATSERUM 0.51* 0.55   CA 9.3 9.4    138   K 4.0 3.7    106   CO2 26.0 24.0       No results for input(s): \"ZAYRA\", \"LIP\" in the last 168 hours.    Recent Labs   Lab 12/21/24 0449 12/22/24  0503   MG 2.5  --    PHOS 1.9* 2.9       No results for input(s): \"URINE\", \"CULTI\", \"BLDSMR\" in the last 168 hours.      No results found.        Pro-Calcitonin  No results for input(s): \"PCT\" in the last 168 hours.    Cardiac  No results for input(s): \"TROP\", \"PBNP\" in the last 168 hours.    Imaging: Imaging data reviewed in Epic.    No results found.       Medications:    gabapentin  300 mg Oral Nightly    levothyroxine  75 mcg Oral Before breakfast    rosuvastatin  10 mg Oral Daily    sennosides  17.2 mg Oral Nightly    docusate sodium  100 mg Oral BID    enoxaparin  40 mg Subcutaneous Daily    acetaminophen  1,000 mg Intravenous Q6H    methocarbamol  500 mg Oral q6h    lisinopril  5 mg Oral Daily       Assessment & Plan:   ASSESSMENT / PLAN:      Cervical spinal stenosis status post transcervical exposure of prior C4-C5 fusion, exploration of fusion mass, C3-C4 and C5-C6 anterior cervical discectomy and fusion using 2 separate anterior fixation plates.  12/18/24;pod#1Pain control.  Neuro checks.  Monitor surgical wound and drain.  DVT prophylaxis.  Physical and occupational therapy; throat swelling with difficulty swallowing; decadron started; poss dc tomorrow  -Seen by speech today recommending puréed diet.  Watch for her improvement over the next 24 hours.     2.     Rapid atrial fib   To tele   Start amio drip   Cards consult   Echo    2.       Essential hypertension.  Continue home medications and monitor.     3.       Hyperlipidemia.  Continue home medications.     4.       Hypothyroidism.  Continue home medications.      Patient will require inpatient services that will reasonably be expected to span two midnight's based on the clinical documentation in H+P.   Based on patients current state of illness, I anticipate that, after discharge, patient will require TBD.  Complex mdm;coordinating care with nurse and counseling pt and with her permission her  in room about   Poss dc tomorrow if throat better       Jonathan Kim MD, FAAP, FACP  [facility] Hospitalist  I respond to Epic Chat and [facility]MS Connect

## 2024-12-23 ENCOUNTER — APPOINTMENT (OUTPATIENT)
Dept: CV DIAGNOSTICS | Facility: HOSPITAL | Age: 79
End: 2024-12-23
Attending: INTERNAL MEDICINE
Payer: MEDICARE

## 2024-12-23 VITALS
WEIGHT: 165 LBS | DIASTOLIC BLOOD PRESSURE: 81 MMHG | HEART RATE: 75 BPM | RESPIRATION RATE: 18 BRPM | OXYGEN SATURATION: 95 % | BODY MASS INDEX: 25.9 KG/M2 | HEIGHT: 67 IN | TEMPERATURE: 98 F | SYSTOLIC BLOOD PRESSURE: 146 MMHG

## 2024-12-23 PROBLEM — Z98.1 S/P CERVICAL SPINAL FUSION: Status: RESOLVED | Noted: 2024-12-18 | Resolved: 2024-12-23

## 2024-12-23 PROBLEM — M48.02 CERVICAL STENOSIS OF SPINE: Status: RESOLVED | Noted: 2024-01-16 | Resolved: 2024-12-23

## 2024-12-23 LAB
GLUCOSE BLDC GLUCOMTR-MCNC: 101 MG/DL (ref 70–99)
T4 FREE SERPL-MCNC: 2 NG/DL (ref 0.8–1.7)

## 2024-12-23 PROCEDURE — 99239 HOSP IP/OBS DSCHRG MGMT >30: CPT | Performed by: INTERNAL MEDICINE

## 2024-12-23 PROCEDURE — 93306 TTE W/DOPPLER COMPLETE: CPT | Performed by: INTERNAL MEDICINE

## 2024-12-23 RX ORDER — ACETAMINOPHEN 500 MG
1000 TABLET ORAL EVERY 6 HOURS PRN
Status: SHIPPED | COMMUNITY
Start: 2024-12-23

## 2024-12-23 RX ORDER — AMIODARONE HYDROCHLORIDE 200 MG/1
200 TABLET ORAL 2 TIMES DAILY
Qty: 60 TABLET | Refills: 0 | Status: SHIPPED | OUTPATIENT
Start: 2024-12-31 | End: 2024-12-23

## 2024-12-23 RX ORDER — AMIODARONE HYDROCHLORIDE 200 MG/1
400 TABLET ORAL 2 TIMES DAILY WITH MEALS
Status: DISCONTINUED | OUTPATIENT
Start: 2024-12-23 | End: 2024-12-23

## 2024-12-23 RX ORDER — AMIODARONE HYDROCHLORIDE 400 MG/1
400 TABLET ORAL 2 TIMES DAILY WITH MEALS
Qty: 14 TABLET | Refills: 0 | Status: SHIPPED | OUTPATIENT
Start: 2024-12-23 | End: 2024-12-23

## 2024-12-23 RX ORDER — METOPROLOL SUCCINATE 25 MG/1
25 TABLET, EXTENDED RELEASE ORAL DAILY
Qty: 30 TABLET | Refills: 1 | Status: SHIPPED | OUTPATIENT
Start: 2024-12-23

## 2024-12-23 RX ORDER — ACETAMINOPHEN 500 MG
1000 TABLET ORAL EVERY 6 HOURS PRN
Status: DISCONTINUED | OUTPATIENT
Start: 2024-12-23 | End: 2024-12-23

## 2024-12-23 RX ORDER — METOPROLOL SUCCINATE 25 MG/1
25 TABLET, EXTENDED RELEASE ORAL
Status: DISCONTINUED | OUTPATIENT
Start: 2024-12-24 | End: 2024-12-23

## 2024-12-23 NOTE — DISCHARGE SUMMARY
[facility]  part of [facility]    Discharge Summary    Alejandra Green Patient Status:  Inpatient    1945 MRN [id number]   Location [facility] 3W/SW Attending Jonathan Kim MD   Hosp Day # 5 PCP Erin Lyles MD     Date of Admission: 2024 Disposition: Final discharge disposition not confirmed     Date of Discharge: 2024    Admitting Diagnosis: Neck pain [M54.2]  Occipital pain [R51.9]  Right hand weakness [R29.898]  Imbalance [R26.89]  History of fusion of cervical spine [Z98.1]  Cervical stenosis of spine [M48.02]  Degenerative cervical disc [M50.30]  S/P cervical spinal fusion    Hospital Discharge Diagnoses:   Cervical spinal stenosis status post transcervical exposure of prior C4-C5 fusion, exploration of fusion mass, C3-C4 and C5-C6 anterior cervical discectomy and fusion using 2 separate anterior fixation plates.    Post operative pain  Post operative atrial fib with RVR   Essential hypertension  Hyperlipidemia  Hypothyroidism.      Lace+ Score: 30  59-90 High Risk  29-58 Medium Risk  0-28   Low Risk.    TCM Follow-Up Recommendation:  LACE 29-58: Moderate Risk of readmission after discharge from the hospital.      Problem List:   Patient Active Problem List   Diagnosis    Sensorineural hearing loss, bilateral    Essential hypertension    Arthropathy of cervical facet joint: right mod-severe C2-3, C3-4 right mod    Neck pain on right side    Cervicogenic headache    C3-4 mild diffuse, C5-6 mod-large diffuse, C6-7 mod diffuse bulging discs    Cervical fusion syndrome: C4-5 ACDF    right C6-7 radiculopathy    Occipital neuralgia of right side    Hyperlipidemia    Hypothyroidism       Reason for Admission: elective cervical surgery    Physical Exam:   General appearance: alert, appears stated age and cooperative  Pulmonary:  clear to auscultation bilaterally  Cardiovascular: S1, S2 normal, no murmur, click, rub or gallop, regular rate and  rhythm  Abdominal: soft, non-tender; bowel sounds normal; no masses,  no organomegaly  Extremities: extremities normal, atraumatic, no cyanosis or edema  Psychiatric: calm      History of Present Illness: Alejandra Green is a very pleasant 79 year old female who presents today for planned Procedure(s):  Transcervical exposure of prior Cervical 4 to 5 fusion, anterior Cervical 3 to 4 and 5 to 6 discectomies and fusion approaching from the left with Dr. Alves. Denies any new complaints since the last neurosurgery office visit.    Hospital Course:  Cervical spinal stenosis status post transcervical exposure of prior C4-C5 fusion, exploration of fusion mass, C3-C4 and C5-C6 anterior cervical discectomy and fusion using 2 separate anterior fixation plates.      2.     Rapid atrial fib               To tele               Amio drip --> PO               Cards consult apprec               Echo ok     2.       Essential hypertension.  Continue home medications and monitor.     3.       Hyperlipidemia.  Continue home medications.     4.       Hypothyroidism.  Continue home medications.       Consultations: NS, Cards    Procedures: ECHO    Complications: Atrial Fib    Discharge Condition: Good    Discharge Medications:      Discharge Medications        START taking these medications        Instructions Prescription details   Amiodarone HCl 400 MG Tabs  Commonly known as: PACERONE      Take 1 tablet (400 mg total) by mouth 2 (two) times daily with meals for 7 days.   Stop taking on: December 30, 2024  Quantity: 14 tablet  Refills: 0     amiodarone 200 MG Tabs  Commonly known as: Pacerone  Start taking on: December 31, 2024      Take 1 tablet (200 mg total) by mouth 2 (two) times daily.   Stop taking on: January 30, 2025  Quantity: 60 tablet  Refills: 0     methocarbamol 500 MG Tabs  Commonly known as: Robaxin      Take 1 tablet (500 mg total) by mouth 3 (three) times daily as needed.   Quantity: 60 tablet  Refills: 0      methylPREDNISolone 4 MG Tbpk  Commonly known as: Medrol Dosepak      Take as directed on dose pack instructions   Quantity: 21 tablet  Refills: 0     oxyCODONE 5 MG Tabs      Take 1 tablet (5 mg total) by mouth every 4 (four) hours as needed.   Quantity: 30 tablet  Refills: 0            CHANGE how you take these medications        Instructions Prescription details   acetaminophen 500 MG Tabs  Commonly known as: Tylenol Extra Strength  What changed: See the new instructions.      Take 1 tablet (500 mg total) by mouth every 4 (four) hours as needed.   Quantity: 120 tablet  Refills: 0     acetaminophen 500 MG Tabs  Commonly known as: Tylenol Extra Strength  What changed: You were already taking a medication with the same name, and this prescription was added. Make sure you understand how and when to take each.      Take 2 tablets (1,000 mg total) by mouth every 6 (six) hours as needed.   Refills: 0            CONTINUE taking these medications        Instructions Prescription details   Benazepril HCl 5 MG Tabs  Commonly known as: LOTENSIN      Take 1 tablet (5 mg total) by mouth daily.   Refills: 0     benzonatate 100 MG Caps  Commonly known as: Tessalon      Take 1 capsule (100 mg total) by mouth 3 (three) times daily as needed for cough. >10 years old   Quantity: 30 capsule  Refills: 0     Calcium Carbonate 600 MG Tabs      Take 1 tablet (600 mg total) by mouth.   Refills: 0     cholecalciferol 10 MCG (400 UNIT) Tabs  Commonly known as: Vitamin D3      Take 1 tablet (400 Units total) by mouth daily.   Refills: 0     ergocalciferol 1.25 MG (17269 UT) Caps  Commonly known as: ERGOCALCIFEROL      Take 1.25 mg by mouth once a week. Saturdays   Stop taking on: December 26, 2024  Refills: 0     Flonase Sensimist 27.5 MCG/SPRAY Susp  Generic drug: Fluticasone Furoate      1 spray in each nostril Nasally Once a day   Refills: 0     gabapentin 300 MG Caps  Commonly known as: Neurontin      Take 1 capsule (300 mg total) by  mouth nightly.   Refills: 0     Imodium A-D 2 MG Tabs  Generic drug: Loperamide HCl      Take 1 tablet (2 mg total) by mouth as needed for Diarrhea.   Refills: 0     levothyroxine 75 MCG Tabs  Commonly known as: Synthroid      Take 1 tablet (75 mcg total) by mouth before breakfast.   Refills: 0     loratadine 10 MG Tabs  Commonly known as: Claritin      Take 1 tablet (10 mg total) by mouth daily.   Refills: 0     Multi-Day Tabs      Take by mouth.   Refills: 0     Prolia 60 MG/ML Sosy  Generic drug: denosumab      Subcutaneous   Refills: 0     rosuvastatin 10 MG Tabs  Commonly known as: Crestor      Take 1 tablet (10 mg total) by mouth daily.   Refills: 0     scopolamine 1 MG/3DAYS Pt72  Commonly known as: Transderm-Scop      1 patch to skin behind the ear as needed Transdermal once every 3 days for 30 days   Refills: 0            STOP taking these medications      aspirin 81 MG Tbec        Zithromax Z-Abilio 250 MG Tabs  Generic drug: azithromycin                  Where to Get Your Medications        These medications were sent to [id number] - [place], [place] - [address] AT [facility] [address], [phone], [phone]  [address] , [facility] [address]      Phone: [phone]   acetaminophen 500 MG Tabs  amiodarone 200 MG Tabs  Amiodarone HCl 400 MG Tabs  methocarbamol 500 MG Tabs  methylPREDNISolone 4 MG Tbpk  oxyCODONE 5 MG Tabs         Follow up Visits: Follow-up with  in 1 week    Follow up Labs:      Other Discharge Instructions:     Jonathan Kim MD  12/23/2024  11:12 AM    > 35 min

## 2024-12-23 NOTE — PROGRESS NOTES
[facility]  part of [facility]    Neurosurgery Progress Note    Alejandra Green Patient Status:  Inpatient    1945 MRN [id number]   Location [facility] 3W/SW Attending Jonathan Kim MD   Hosp Day # 5 PCP Erin Lyles MD     Subjective:  Alejandra Green is a(n) 79 year old female postoperative day 5 status post ACDF.  Patient with improving swallowing.  She is ready to go home.  Alert, orientated x3.  Cooperative.  No apparent distress.    Vital Signs:    Temp:  [98 °F (36.7 °C)-98.2 °F (36.8 °C)] 98 °F (36.7 °C)  Pulse:  [] 75  Resp:  [16-18] 18  BP: ()/(62-84) 146/81  SpO2:  [94 %-96 %] 95 %    I/O:  I/O last 3 completed shifts:  In: 110 [P.O.:100; I.V.:10]  Out: 1 [Urine:1]    Inpatient Medications:    Current Facility-Administered Medications:     acetaminophen (Tylenol Extra Strength) tab 1,000 mg, 1,000 mg, Oral, Q6H PRN    [START ON 2024] metoprolol succinate ER (Toprol XL) 24 hr tab 25 mg, 25 mg, Oral, Daily Beta Blocker    metoprolol (Lopressor) 5 mg/5mL injection 5 mg, 5 mg, Intravenous, Q15 Min PRN    gabapentin (Neurontin) cap 300 mg, 300 mg, Oral, Nightly    levothyroxine (Synthroid) tab 75 mcg, 75 mcg, Oral, Before breakfast    rosuvastatin (Crestor) tab 10 mg, 10 mg, Oral, Daily    sennosides (Senokot) tab 17.2 mg, 17.2 mg, Oral, Nightly    docusate sodium (Colace) cap 100 mg, 100 mg, Oral, BID    polyethylene glycol (PEG 3350) (Miralax) 17 g oral packet 17 g, 17 g, Oral, Daily PRN    magnesium hydroxide (Milk of Magnesia) 400 MG/5ML oral suspension 30 mL, 30 mL, Oral, Daily PRN    bisacodyl (Dulcolax) 10 MG rectal suppository 10 mg, 10 mg, Rectal, Daily PRN    fleet enema (Fleet) rectal enema 133 mL, 1 enema, Rectal, Once PRN    ondansetron (Zofran) 4 MG/2ML injection 4 mg, 4 mg, Intravenous, Q6H PRN    metoclopramide (Reglan) 5 mg/mL injection 10 mg, 10 mg, Intravenous, Q8H PRN    diphenhydrAMINE (Benadryl) cap/tab 25 mg,  25 mg, Oral, Q4H PRN **OR** diphenhydrAMINE (Benadryl) 50 mg/mL  injection 25 mg, 25 mg, Intravenous, Q4H PRN    benzocaine-menthol (Cepacol) lozenge 1 lozenge, 1 lozenge, Buccal, Q15 Min PRN    enoxaparin (Lovenox) 40 MG/0.4ML SUBQ injection 40 mg, 40 mg, Subcutaneous, Daily    oxyCODONE immediate release tab 2.5 mg, 2.5 mg, Oral, Q4H PRN **OR** oxyCODONE immediate release tab 5 mg, 5 mg, Oral, Q4H PRN    HYDROmorphone (Dilaudid) 1 MG/ML injection 0.2 mg, 0.2 mg, Intravenous, Q2H PRN **OR** HYDROmorphone (Dilaudid) 1 MG/ML injection 0.4 mg, 0.4 mg, Intravenous, Q2H PRN    methocarbamol (Robaxin) tab 500 mg, 500 mg, Oral, q6h    scopolamine (Transderm-Scop) 1 MG/3DAYS patch 1 patch, 1 patch, Transdermal, PRN    hydrALAzine (Apresoline) 20 mg/mL injection 10 mg, 10 mg, Intravenous, Q4H PRN    Labs:  Lab Results   Component Value Date    WBC 16.1 (H) 12/22/2024    HGB 13.3 12/22/2024    .0 12/22/2024    BUN 19 12/22/2024     12/22/2024    K 3.8 12/22/2024    CO2 24.0 12/22/2024     (H) 12/22/2024    ALB 4.9 (H) 11/11/2024    PTT 29.3 11/11/2024    INR 0.99 11/11/2024         Neurological Exam:  Strength:  5 out of 5 in bilateral upper extremities    Sensation:  Intact in bilateral upper extremities throughout    Incision:  Clean/dry/intact. No erythema, discharge, warmth.     Abdomen:  Soft, non-distended, non-tender, with no rebound or guarding.  No peritoneal signs.   Extremities:  Non-tender, no lower extremity edema noted.        Imaging:  No results found.    Assessment/Plan:  Active Problems:    Essential hypertension    Hyperlipidemia    Hypothyroidism      Alejandra Coboser is a(n) 79 year old female postoperative day 4 status post ACDF.  She will return to her normal diet and utilize softer foods as needed.  We have provided her a Medrol Dosepak to be taken as an outpatient for steroid wean.  She will follow-up in the office in 2 weeks.    All questions and concerns were addressed.  We appreciate the opportunity to participate in the care of this patient. Please do not hesitate to call our office ([phone]) with any issues.    Glenn Cruz PA-C  12/23/2024  4:19 PM

## 2024-12-23 NOTE — PLAN OF CARE
Problem: Patient Centered Care  Goal: Patient preferences are identified and integrated in the patient's plan of care  Description: Interventions:  - Provide timely, complete, and accurate information to patient/family  - Incorporate patient and family knowledge, values, beliefs, and cultural backgrounds into the planning and delivery of care  - Encourage patient/family to participate in care and decision-making at the level they choose  - Honor patient and family perspectives and choices  Outcome: Adequate for Discharge     Problem: PAIN - ADULT  Goal: Verbalizes/displays adequate comfort level or patient's stated pain goal  Description: INTERVENTIONS:  - Encourage pt to monitor pain and request assistance  - Assess pain using appropriate pain scale  - Administer analgesics based on type and severity of pain and evaluate response  - Implement non-pharmacological measures as appropriate and evaluate response  - Consider cultural and social influences on pain and pain management  - Manage/alleviate anxiety  - Utilize distraction and/or relaxation techniques  - Monitor for opioid side effects  - Notify MD/LIP if interventions unsuccessful or patient reports new pain  - Anticipate increased pain with activity and pre-medicate as appropriate  Outcome: Adequate for Discharge     Problem: RISK FOR INFECTION - ADULT  Goal: Absence of fever/infection during anticipated neutropenic period  Description: INTERVENTIONS  - Monitor WBC  - Administer growth factors as ordered  - Implement neutropenic guidelines  Outcome: Adequate for Discharge     Problem: SAFETY ADULT - FALL  Goal: Free from fall injury  Description: INTERVENTIONS:  - Assess pt frequently for physical needs  - Identify cognitive and physical deficits and behaviors that affect risk of falls.  - [place] fall precautions as indicated by assessment.  - Educate pt/family on patient safety including physical limitations  - Instruct pt to call for assistance with  activity based on assessment  - Modify environment to reduce risk of injury  - Provide assistive devices as appropriate  - Consider OT/PT consult to assist with strengthening/mobility  - Encourage toileting schedule  Outcome: Adequate for Discharge     Problem: DISCHARGE PLANNING  Goal: Discharge to home or other facility with appropriate resources  Description: INTERVENTIONS:  - Identify barriers to discharge w/pt and caregiver  - Include patient/family/discharge partner in discharge planning  - Arrange for needed discharge resources and transportation as appropriate  - Identify discharge learning needs (meds, wound care, etc)  - Arrange for interpreters to assist at discharge as needed  - Consider post-discharge preferences of patient/family/discharge partner  - Complete POLST form as appropriate  - Assess patient's ability to be responsible for managing their own health  - Refer to Case Management Department for coordinating discharge planning if the patient needs post-hospital services based on physician/LIP order or complex needs related to functional status, cognitive ability or social support system  Outcome: Adequate for Discharge

## 2024-12-23 NOTE — PROGRESS NOTES
Progress Note  Alejandra Green Patient Status:  Inpatient    1945 MRN [id number]   Location [facility] 3W/SW Attending Jonathan Kim MD   Hosp Day # 5 PCP Erin Lyles MD     Subjective:  Denies cardiac complaints    Objective:  /78 (BP Location: Right arm)   Pulse 80   Temp 98.1 °F (36.7 °C) (Oral)   Resp 16   Ht 5' 7\" (1.702 m)   Wt 165 lb (74.8 kg)   SpO2 95%   BMI 25.84 kg/m²     Telemetry: SR    Intake/Output:    Intake/Output Summary (Last 24 hours) at 2024 1415  Last data filed at 2024 1353  Gross per 24 hour   Intake 230 ml   Output --   Net 230 ml     Last 3 Weights   24 0500 165 lb (74.8 kg)   24 1513 166 lb 6.4 oz (75.5 kg)   24 0819 160 lb (72.6 kg)   24 1201 160 lb (72.6 kg)   24 1154 162 lb (73.5 kg)   24 1628 161 lb (73 kg)   10/25/24 1131 162 lb (73.5 kg)     Labs:  Recent Labs   Lab 24  0449 24  0503 24  1157   * 153*  --    BUN 13 19  --    CREATSERUM 0.51* 0.55  --    EGFRCR 96 94  --    CA 9.3 9.4  --     138  --    K 4.0 3.7 3.8    106  --    CO2 26.0 24.0  --      Recent Labs   Lab 24  0449 24  0503   RBC 4.50 4.45   HGB 13.2 13.3   HCT 38.9 39.1   MCV 86.4 87.9   MCH 29.3 29.9   MCHC 33.9 34.0   RDW 13.0 13.2   NEPRELIM 13.72* 14.20*   WBC 15.6* 16.1*   .0 382.0     Recent Labs   Lab 24  1114 12/22/24  1157   TROPHS 25 43*     Lab Results   Component Value Date/Time    HDL 50 2024 11:57 AM    LDL 64 2024 11:57 AM    TRIG 110 2024 11:57 AM     No results found for: \"DDIMER\"  Lab Results   Component Value Date    TSH 0.164 (L) 2024     Review of Systems:   Constitutional: No fevers, chills, fatigue or night sweats.  Respiratory: Denies cough, wheezing or shortness of breath.  CV: Denies chest pain, palpitations, orthopnea, PND or dizziness.  GI: No nausea, vomiting or diarrhea. No blood in stools.    Physical  Exam:  General: Alert, cooperative, no distress, appears stated age.  Neck: no JVD.  Lungs: Clear to auscultation bilaterally.  Chest wall: No tenderness or deformity.  Heart: Regular rate and rhythm, S1, S2 normal, no murmur, click, rub or gallop.  Abdomen: Soft, non-tender. Bowel sounds normal. No masses,  No organomegaly.  Extremities: Extremities normal, atraumatic, no cyanosis or edema.  Pulses: 2+ and symmetric all extremities.  Neurologic: Grossly intact.    Diagnostics:  Echo: 12/23/24  Conclusions:     1. Left ventricle: The cavity size was normal. There was mild concentric      hypertrophy. Systolic function was normal. The estimated ejection      fraction was 60-65%, by biplane method of disks. No diagnostic evidence      for regional wall motion abnormalities. Features are consistent with a      pseudonormal left ventricular filling pattern, with concomitant abnormal      relaxation and increased filling pressure - grade 2 diastolic      dysfunction.   2. Right ventricle: The cavity size was normal. Systolic function was      normal.   3. Inferior vena cava: The IVC was normal-sized. Respirophasic diameter      changes are blunted (< 50%), consistent with elevated central venous      pressure.   Impressions:  No previous study was available for comparison.   *   Medications:   amiodarone  400 mg Oral BID with meals    gabapentin  300 mg Oral Nightly    levothyroxine  75 mcg Oral Before breakfast    rosuvastatin  10 mg Oral Daily    sennosides  17.2 mg Oral Nightly    docusate sodium  100 mg Oral BID    enoxaparin  40 mg Subcutaneous Daily    methocarbamol  500 mg Oral q6h     Assessment:    79 year old female with PMH of hypothyroid, HTN, HLD, Osteoarthritis, cervical spondylolysis who was admitted on 12/18 after spinal surgery. Post-operatively, she had some AFRVR and cardiology was consulted.     New onset atrial fibrillation with RVR  Post-operative AFRVR with rates up to 140s  -no previous hx AF per  patient, however,she states she has had previous episodes of palpitations, follows with cardiology at [place]  -started on amiodarone gtt, converted to sinus   -not on oac with short duration and recent spinal surgery, if recurrent, would need DOAC  -Echo revealed normal LV function 60-65% with G2DD, elevated CVP  -maintaining sinus on tele  -amio gtt stopped, started on PO amio, may consider diltiazem or beta blockade instead of amiodarone and outpatient MCT to assess burden with her primary cardiologist  -TSH 0.164, will add on reflex T4  -[id number]= 4 for age, gender, HTN    HTN  -150s  -not on home benazepril    HLD-statin    Osteoarthritis    Cervical spondylolysis  S/p ACDF      Plan:  -Start toprol 25mg daily  -Stop amiodarone  -14 MCT after discharge to monitor burden given reported hx of palpitations  -recommend DOAC with 7 hour event, will neurosurgery cleared to start 7 days post op (12/26) due to recent surgery  -Stable for discharge home from cardiac standpoint    Plan of care discussed with patient, RN, Dr. Jiménez.        Bindu Dawson, APRN  12/23/2024  2:15 PM  [phone] [place]  [phone] [place]      Seen/examined patient independently.  Agree with findings, assessment and plan as outlined above.     No further AF  Reports no further palpitations. No CP or SOB.    In regards to paroxysmal AF, pt has remained in SR. Will discontinue amiodarone, start metoprolol succinate 25mg daily. Given elevated [id number], will start OAC once safe from NSGY standpoint - per prelim discussions advised for 7 days post-op. Outpatient MCT.    Follow-up with cardiology.     Albert Jiménez DO

## 2024-12-23 NOTE — PLAN OF CARE
Pt A/Ox4, self care. Neuro checks Q4. Aspen collar worn overnight. IVF and Ofirmev continued. [facility] notified of low HR, Hazelo wilbur paused, see orders.     Problem: Patient Centered Care  Goal: Patient preferences are identified and integrated in the patient's plan of care  Description: Interventions:  - What would you like us to know as we care for you? I would like to return home.   - Provide timely, complete, and accurate information to patient/family  - Incorporate patient and family knowledge, values, beliefs, and cultural backgrounds into the planning and delivery of care  - Encourage patient/family to participate in care and decision-making at the level they choose  - Honor patient and family perspectives and choices  Outcome: Progressing     Problem: Patient/Family Goals  Goal: Patient/Family Long Term Goal  Description: Patient's Long Term Goal: Get stronger.     Interventions:  - Ambulate often.   - See additional Care Plan goals for specific interventions  Outcome: Progressing  Goal: Patient/Family Short Term Goal  Description: Patient's Short Term Goal: Ask frequent questions.     Interventions:   - Work with staff on ways to improve overall health status.   - See additional Care Plan goals for specific interventions  Outcome: Progressing     Problem: PAIN - ADULT  Goal: Verbalizes/displays adequate comfort level or patient's stated pain goal  Description: INTERVENTIONS:  - Encourage pt to monitor pain and request assistance  - Assess pain using appropriate pain scale  - Administer analgesics based on type and severity of pain and evaluate response  - Implement non-pharmacological measures as appropriate and evaluate response  - Consider cultural and social influences on pain and pain management  - Manage/alleviate anxiety  - Utilize distraction and/or relaxation techniques  - Monitor for opioid side effects  - Notify MD/LIP if interventions unsuccessful or patient reports new pain  - Anticipate increased  pain with activity and pre-medicate as appropriate  Outcome: Progressing     Problem: RISK FOR INFECTION - ADULT  Goal: Absence of fever/infection during anticipated neutropenic period  Description: INTERVENTIONS  - Monitor WBC  - Administer growth factors as ordered  - Implement neutropenic guidelines  Outcome: Progressing     Problem: SAFETY ADULT - FALL  Goal: Free from fall injury  Description: INTERVENTIONS:  - Assess pt frequently for physical needs  - Identify cognitive and physical deficits and behaviors that affect risk of falls.  - [place] fall precautions as indicated by assessment.  - Educate pt/family on patient safety including physical limitations  - Instruct pt to call for assistance with activity based on assessment  - Modify environment to reduce risk of injury  - Provide assistive devices as appropriate  - Consider OT/PT consult to assist with strengthening/mobility  - Encourage toileting schedule  Outcome: Progressing     Problem: DISCHARGE PLANNING  Goal: Discharge to home or other facility with appropriate resources  Description: INTERVENTIONS:  - Identify barriers to discharge w/pt and caregiver  - Include patient/family/discharge partner in discharge planning  - Arrange for needed discharge resources and transportation as appropriate  - Identify discharge learning needs (meds, wound care, etc)  - Arrange for interpreters to assist at discharge as needed  - Consider post-discharge preferences of patient/family/discharge partner  - Complete POLST form as appropriate  - Assess patient's ability to be responsible for managing their own health  - Refer to Case Management Department for coordinating discharge planning if the patient needs post-hospital services based on physician/LIP order or complex needs related to functional status, cognitive ability or social support system  Outcome: Progressing

## 2024-12-23 NOTE — PROGRESS NOTES
Patient was provided with discharge instructions, education, and follow up information. Patient's Spouse present for discharge instructions with patient's consent. Prescriptions were already sent electronically to patient's pharmacy. Free 30 day supply of Eliquis coupon given to pt. Patient verbalizes understanding of follow up information. Patient has no questions after reviewing all instructions and will be going home. Tele and PIV removed. All personal belongings packed and taken with patient.     Bonny AYALA RN

## 2024-12-23 NOTE — PROGRESS NOTES
Notified by RN pt converted from afib to sinus rhythm. Amiodarone gtt currrenlty infusing. Advised RN to continue amio infusion until HR is < 60.       Addendum 2100:  Per RN, HR now in the 50s. Amiodarone gtt paused for now.

## 2024-12-23 NOTE — PHYSICAL THERAPY NOTE
Physical Therapy Discharge Note    Patient has functionally progressed to independence, has been up independently in room and ambulating with family around unit, tolerating distances >1000'. Patient verbalizes no further mobility concerns at this time. Physical Therapy to sign off at this time as patient has adequately achieved all therapy goals at this level of care to D/C safely, please re-consult if patient experiences a decline in functional status. Anticipate patient to return home with no skilled therapy needs.     Blanca Knox, PT, DPT  [facility]  Rehab Services - Physical Therapy  [id number]

## 2024-12-23 NOTE — SLP NOTE
ADULT SWALLOWING RE-EVALUATION    ASSESSMENT    ASSESSMENT/OVERALL IMPRESSION:  SLP service received an order for a repeat evaluation. The evaluation was completed and is suggestive of mild pharyngeal dysphagia with no overt s/s of laryngeal penetration or aspiration.    Pt was seated upright on a chair with family by bedside. Pt is fully alert and oriented. Pt followed conversational speech with 100% accuracy. Oral motor skills were functional for age with functionally adequate symmetry, strength, rate and range of motion.   Clinical trials of thin liquids and soft solids were administered this session. Pt exhibited functionally adequate oral bolus and oral transit. Palpation of neck to assess hyolaryngeal excursion could not be completed because of the presence of a cervical collar. Pt utilized about two swallows per bolus for the solid consistency. No overt s/s of laryngeal penetration or aspiration were observed for any consistency. No remarkable oral residue was noted. Pt had a clear voice and stable respiratory status throughout the session.   Based on the findings, it is recommended that pt continue a regular solid diet (with a choice of softer solids as needed) and thin liquids.   Pt has achieved a least restrictive diet recommendation and is not in need of skilled inpatient SLP services at this time. Thank you for the referral.          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  Active Problems:    Essential hypertension    Hyperlipidemia    Hypothyroidism    S/P cervical spinal fusion      Past Medical History  Past Medical History:    Back problem    Disorder of thyroid    Essential hypertension    High blood pressure    High cholesterol    Hyperlipidemia    Osteoarthritis    PONV (postoperative nausea and vomiting)    Visual impairment       Prior Living Situation: Home with spouse  Diet Prior to Admission: Regular;Thin liquids       Patient/Family Goals: None at this time for swallowing    SWALLOWING HISTORY  Current Diet Consistency: NPO  Dysphagia History: Re-eval this date. None prior to this admission.   Imaging Results:     CT Soft tissue neck - 12/19    Moderate prevertebral postoperative swelling.  No radiopaque foreign body.      Blunting of the aryepiglottic folds within the subglottic airway, likely related to local postoperative edema.     SUBJECTIVE       OBJECTIVE   ORAL MOTOR EXAMINATION  Dentition: Functional  Symmetry: Within Functional Limits  Strength: Within Functional Limits  Tone: Within Functional Limits  Range of Motion: Within Functional Limits  Rate of Motion: Within Functional Limits    Voice Quality: Clear  Respiratory Status: Unlabored  Consistencies Trialed: Thin liquids;Soft solid  Method of Presentation: Self presentation;Spoon;Cup  Patient Positioned: Upright    Oral Phase of Swallow: Within Functional Limits                      Pharyngeal Phase of Swallow: Appears Impaired  Laryngeal Elevation Timing: Appears impaired  Laryngeal Elevation Strength: Appears impaired  Laryngeal Elevation Coordination: Appears intact  (Please note: Silent aspiration cannot be evaluated clinically. Videofluoroscopic Swallow Study is required to rule-out silent aspiration.)    Esophageal Phase of Swallow: No complaints consistent with possible esophageal involvement              GOALS  Goal #1 The patient will tolerate regular solid consistency and thin liquids  without overt signs or symptoms of aspiration with 100 % accuracy over 1 session(s).  Met   Goal #2 The patient/family/caregiver will demonstrate understanding and implementation of aspiration precautions and swallow strategies independently over 1 session(s).    Met     FOLLOW UP  Treatment Plan/Recommendations: No further inpatient SLP service warranted  Duration: 1 week  Follow Up Needed (Documentation Required): No  SLP Follow-up Date: 12/22/24    Thank you for your referral.   If you have any questions, please contact Reji Mixon, RUBY Mixon, Ph.D., [facility]-SLP  Speech-Language Pathologist  Phone number Ext.: [phone]

## 2024-12-24 ENCOUNTER — HOSPITAL ENCOUNTER (EMERGENCY)
Facility: HOSPITAL | Age: 79
Discharge: HOME | End: 2024-12-25
Attending: EMERGENCY MEDICINE
Payer: MEDICARE

## 2024-12-24 DIAGNOSIS — R13.10 DYSPHAGIA, UNSPECIFIED TYPE: ICD-10-CM

## 2024-12-24 DIAGNOSIS — F41.9 ANXIETY: ICD-10-CM

## 2024-12-24 DIAGNOSIS — R11.2 NAUSEA AND VOMITING IN ADULT: Primary | ICD-10-CM

## 2024-12-24 PROCEDURE — 99284 EMERGENCY DEPT VISIT MOD MDM: CPT

## 2024-12-24 PROCEDURE — 99283 EMERGENCY DEPT VISIT LOW MDM: CPT

## 2024-12-24 PROCEDURE — 96372 THER/PROPH/DIAG INJ SC/IM: CPT

## 2024-12-25 VITALS
TEMPERATURE: 97 F | RESPIRATION RATE: 18 BRPM | OXYGEN SATURATION: 92 % | WEIGHT: 160 LBS | HEIGHT: 67 IN | SYSTOLIC BLOOD PRESSURE: 138 MMHG | DIASTOLIC BLOOD PRESSURE: 76 MMHG | HEART RATE: 74 BPM | BODY MASS INDEX: 25.11 KG/M2

## 2024-12-25 PROCEDURE — S0119 ONDANSETRON 4 MG: HCPCS | Performed by: EMERGENCY MEDICINE

## 2024-12-25 RX ORDER — DIAZEPAM 5 MG/1
5 TABLET ORAL 3 TIMES DAILY PRN
Qty: 20 TABLET | Refills: 0 | Status: SHIPPED | OUTPATIENT
Start: 2024-12-25 | End: 2024-12-28

## 2024-12-25 RX ORDER — DIAZEPAM 10 MG/2ML
5 INJECTION, SOLUTION INTRAMUSCULAR; INTRAVENOUS ONCE
Status: COMPLETED | OUTPATIENT
Start: 2024-12-25 | End: 2024-12-25

## 2024-12-25 RX ORDER — ONDANSETRON 4 MG/1
4 TABLET, ORALLY DISINTEGRATING ORAL ONCE
Status: COMPLETED | OUTPATIENT
Start: 2024-12-25 | End: 2024-12-25

## 2024-12-25 RX ORDER — ONDANSETRON 4 MG/1
4 TABLET, ORALLY DISINTEGRATING ORAL EVERY 4 HOURS PRN
Qty: 20 TABLET | Refills: 0 | Status: SHIPPED | OUTPATIENT
Start: 2024-12-25 | End: 2024-12-28

## 2024-12-25 NOTE — ED INITIAL ASSESSMENT (HPI)
Pt arrives through triage with family      complaints of vomiting that started earlier today, states she can't keep any meds down. Pt recently discharge for a vertebra fusion. States she cannot tolerate the neck brace she has to wear 24/7 for the next 3 months and requesting meds to \"knock her out\"

## 2024-12-25 NOTE — ED QUICK NOTES
PT to ED with spouse, reporting neck tightness, nausea, and vomiting after cervical spinal fusion on 12/18/24. Worried that she is unable to keep her medication down and unable to tolerate aspen collar. Arrives to ED in aspen collar.

## 2024-12-26 NOTE — ED PROVIDER NOTES
Patient Seen in: [facility] Emergency Department    History     Chief Complaint   Patient presents with    Vomiting    Medication Request       HPI    Patient presents to the ED with her  after be discharged to the hospital today and vomiting which she tried to eat food at home.   states that she is severely anxious about having to wear neck brace after having a recent spinal fusion.  Patient states he feels like the back brace is \"pushing on my trachea.\"    History reviewed.   Past Medical History:    Back problem    Disorder of thyroid    Essential hypertension    High blood pressure    High cholesterol    Hyperlipidemia    Osteoarthritis    PONV (postoperative nausea and vomiting)    Visual impairment       History reviewed.   Past Surgical History:   Procedure Laterality Date    Back surgery      Cholecystectomy      Cystoscopy,insert ureteral stent      Hernia surgery      Hysterectomy      Knee replacement surgery Bilateral     X2    Spine surgery procedure unlisted      Trigger finger release N/A     X4         Medications :  Prescriptions Prior to Admission[1]     Family History   Problem Relation Age of Onset    Heart Disease Father     Cancer Mother     Heart Attack Sister        Smoking Status:   Social History     Socioeconomic History    Marital status:    Tobacco Use    Smoking status: Never    Smokeless tobacco: Never   Vaping Use    Vaping status: Never Used   Substance and Sexual Activity    Alcohol use: No    Drug use: No   Other Topics Concern    Caffeine Concern No    Exercise Yes       Constitutional and vital signs reviewed.      Social History and Family History elements reviewed from today, pertinent positives to the presenting problem noted.    Physical Exam     ED Triage Vitals [12/24/24 2333]   /84   Pulse 77   Resp 18   Temp 97.1 °F (36.2 °C)   Temp src Temporal   SpO2 93 %   O2 Device None (Room air)       All measures to prevent infection transmission  during my interaction with the patient were taken. Handwashing was performed prior to and after the exam.  Stethoscope and any equipment used during my examination was cleaned with super sani-cloth germicidal wipes following the exam.     Physical Exam  Vitals and nursing note reviewed.   Constitutional:       General: She is not in acute distress.     Appearance: She is well-developed. She is not ill-appearing or toxic-appearing.   HENT:      Head: Normocephalic and atraumatic.   Eyes:      General:         Right eye: No discharge.         Left eye: No discharge.      Conjunctiva/sclera: Conjunctivae normal.   Neck:      Trachea: No tracheal deviation.      Comments: Wearing an Aspen collar  Cardiovascular:      Rate and Rhythm: Normal rate.   Pulmonary:      Effort: Pulmonary effort is normal. No respiratory distress.      Breath sounds: No stridor.   Abdominal:      General: There is no distension.      Palpations: Abdomen is soft.      Tenderness: There is no abdominal tenderness.   Musculoskeletal:         General: No deformity.   Skin:     General: Skin is warm and dry.   Neurological:      Mental Status: She is alert and oriented to person, place, and time.   Psychiatric:         Behavior: Behavior normal.      Comments: Anxious mood         ED Course        Labs Reviewed   RAINBOW DRAW LAVENDER   RAINBOW DRAW LIGHT GREEN       As Interpreted by me    Imaging Results Available and Reviewed while in ED: No results found.  ED Medications Administered:   Medications   ondansetron (Zofran-ODT) disintegrating tab 4 mg (4 mg Oral Given 12/25/24 0139)   diazepam (Valium) 5 mg/mL injection 5 mg (5 mg Intramuscular Given 12/25/24 0140)         MDM     Vitals:    12/25/24 0045 12/25/24 0100 12/25/24 0130 12/25/24 0315   BP: 148/84 147/77 142/80 138/76   Pulse: 69 65 70 74   Resp:       Temp:       TempSrc:       SpO2: 93% 93% 93% 92%   Weight:       Height:         *I personally reviewed and interpreted all ED  vitals.    Pulse Ox: 92%, Room air, Normal     Monitor Interpretation:   normal sinus rhythm as interpreted by me.  The cardiac monitor was ordered to monitor heart rate.    Differential Diagnosis/ Diagnostic Considerations: Anxiety, vomiting, other    Complicating Factors: The patient already has does not have any pertinent problems on file. to contribute to the complexity of this ED evaluation.    Medical Decision Making  The patient presents to the ED with vomiting and anxiety.  It appears that she is very distraught about having to wear her neck brace and is causing anxiety.  Patient talking normally in the ED and no evidence for airway obstruction or pharyngitis.  Given anxiety medication and then drinking fluids well.  Stable for discharge with continued anxiety control with outpatient follow-up.    Problems Addressed:  Anxiety: acute illness or injury  Dysphagia, unspecified type: acute illness or injury  Nausea and vomiting in adult: acute illness or injury    Risk  Prescription drug management.        Condition upon leaving the department: Stable    Disposition and Plan     Clinical Impression:  1. Nausea and vomiting in adult    2. Dysphagia, unspecified type    3. Anxiety        Disposition:  Discharge    Follow-up:  Erin Lyles MD  [address] [address]  [phone]    Schedule an appointment as soon as possible for a visit in 3 day(s)        Medications Prescribed:  Discharge Medication List as of 12/25/2024  3:28 AM        START taking these medications    Details   diazePAM 5 MG Oral Tab Take 1 tablet (5 mg total) by mouth 3 (three) times daily as needed for Anxiety., Normal, Disp-20 tablet, R-0      ondansetron 4 MG Oral Tablet Dispersible Take 1 tablet (4 mg total) by mouth every 4 (four) hours as needed for Nausea., Normal, Disp-20 tablet, R-0                              [1] (Not in a hospital admission)

## 2024-12-27 ENCOUNTER — TELEPHONE (OUTPATIENT)
Dept: SURGERY | Facility: CLINIC | Age: 79
End: 2024-12-27

## 2024-12-27 NOTE — TELEPHONE ENCOUNTER
Left detailed message for patient that Dr. Alves and Derek ROBERTO are aware of the ER visit. Advised Derek sent a [employer] message home about the cervical collar. Advised them to read the message and if they had further questions to call the office back.

## 2024-12-27 NOTE — TELEPHONE ENCOUNTER
Patients  is reaching out to the office to make sure office is aware that patient was in ER on 12/24/2024, an was prescribed medication.

## 2024-12-28 ENCOUNTER — TELEPHONE (OUTPATIENT)
Dept: SURGERY | Facility: CLINIC | Age: 79
End: 2024-12-28

## 2024-12-28 DIAGNOSIS — Z98.1 S/P CERVICAL SPINAL FUSION: ICD-10-CM

## 2024-12-28 DIAGNOSIS — F41.9 ANXIETY: ICD-10-CM

## 2024-12-28 RX ORDER — DIAZEPAM 5 MG/1
5 TABLET ORAL 3 TIMES DAILY PRN
Qty: 20 TABLET | Refills: 0 | Status: SHIPPED | OUTPATIENT
Start: 2024-12-28 | End: 2025-01-04

## 2024-12-28 RX ORDER — OXYCODONE HYDROCHLORIDE 5 MG/1
5 TABLET ORAL EVERY 4 HOURS PRN
Qty: 30 TABLET | Refills: 0 | Status: SHIPPED | OUTPATIENT
Start: 2024-12-28

## 2024-12-28 RX ORDER — DOCUSATE SODIUM 100 MG/1
100 CAPSULE, LIQUID FILLED ORAL 2 TIMES DAILY
Qty: 20 CAPSULE | Refills: 0 | Status: SHIPPED | OUTPATIENT
Start: 2024-12-28

## 2024-12-28 RX ORDER — ONDANSETRON 4 MG/1
4 TABLET, ORALLY DISINTEGRATING ORAL EVERY 4 HOURS PRN
Qty: 20 TABLET | Refills: 0 | Status: SHIPPED | OUTPATIENT
Start: 2024-12-28

## 2024-12-28 NOTE — TELEPHONE ENCOUNTER
Spoke to patient's spouse. Concerned over not having enough meds to get through the weekend. Was seen in the ED on 12/26 with anxiety and an taking off c collar and was prescribed Diazepam. Patient's spouse, Abdullahi states that she is still very anxious and has not had a bowel movement since the 18th. He has been giving her Diazepam, Zofran, Oxycodone around the clock. He states that she gets very sleepy, but is a very anxious person and he hates to see her in pain. I reinforced that the meds are ordered to be given as needed and if she is sleepy, she should not be given the oxycodone and diazepam. Her nausea and constipation are most likely side effects of the narcotics. Encouraged to just address symptoms as they come up. I refilled oxycodone, diazepam and zofran and prescribed colace. Instructed to follow up with the clinic on Monday. Will send a message to CHU Peterson to follow up.

## 2025-01-03 PROBLEM — R06.09 DYSPNEA ON EXERTION: Status: ACTIVE | Noted: 2025-01-03

## 2025-01-03 PROBLEM — I49.1 PAC (PREMATURE ATRIAL CONTRACTION): Status: ACTIVE | Noted: 2024-11-06

## 2025-01-03 PROBLEM — M81.0 SENILE OSTEOPOROSIS: Status: ACTIVE | Noted: 2023-08-14

## 2025-01-03 PROBLEM — I10 PRIMARY HYPERTENSION: Status: ACTIVE | Noted: 2024-11-06

## 2025-01-03 PROBLEM — R00.2 PALPITATIONS: Status: ACTIVE | Noted: 2024-11-06

## 2025-01-03 RX ORDER — CODEINE PHOSPHATE AND GUAIFENESIN 10; 100 MG/5ML; MG/5ML
SOLUTION ORAL
COMMUNITY
Start: 2024-12-11

## 2025-01-03 RX ORDER — ERGOCALCIFEROL 1.25 MG/1
CAPSULE, LIQUID FILLED ORAL
COMMUNITY
Start: 2024-11-12

## 2025-01-03 RX ORDER — ALBUTEROL SULFATE 90 UG/1
1 INHALANT RESPIRATORY (INHALATION) EVERY 4 HOURS PRN
COMMUNITY
Start: 2024-12-11

## 2025-01-03 NOTE — PATIENT INSTRUCTIONS
Refill policies:    Allow 2-3 business days for refills; controlled substances may take longer.  Contact your pharmacy at least 5 days prior to running out of medication and have them send an electronic request or submit request through the “request refill” option in your [employer] account.  Refills are not addressed on weekends; covering physicians do not authorize routine medications on weekends.  No narcotics or controlled substances are refilled after noon on Fridays or by on call physicians.  By law, narcotics must be electronically prescribed.  A 30 day supply with no refills is the maximum allowed.  If your prescription is due for a refill, you may be due for a follow up appointment.  To best provide you care, patients receiving routine medications need to be seen at least once a year.  Patients receiving narcotic/controlled substance medications need to be seen at least once every 3 months.  In the event that your preferred pharmacy does not have the requested medication in stock (e.g. Backordered), it is your responsibility to find another pharmacy that has the requested medication available.  We will gladly send a new prescription to that pharmacy at your request.    Scheduling Tests:    If your physician has ordered radiology tests such as MRI or CT scans, please contact Central Scheduling at [phone] right away to schedule the test.  Once scheduled, the [facility] Centralized Referral Team will work with your insurance carrier to obtain pre-certification or prior authorization.  Depending on your insurance carrier, approval may take 3-10 days.  It is highly recommended patients assure they have received an authorization before having a test performed.  If test is done without insurance authorization, patient may be responsible for the entire amount billed.      Precertification and Prior Authorizations:  If your physician has recommended that you have a procedure or additional testing performed the [facility]  Centralized Referral Team will contact your insurance carrier to obtain pre-certification or prior authorization.    You are strongly encouraged to contact your insurance carrier to verify that your procedure/test has been approved and is a COVERED benefit.  Although the [facility] Centralized Referral Team does its due diligence, the insurance carrier gives the disclaimer that \"Although the procedure is authorized, this does not guarantee payment.\"    Ultimately the patient is responsible for payment.   Thank you for your understanding in this matter.  Paperwork Completion:  If you require FMLA or disability paperwork for your recovery, please make sure to either drop it off or have it faxed to our office at [phone]. Be sure the form has your name and date of birth on it.  The form will be faxed to our Forms Department and they will complete it for you.  There is a 25$ fee for all forms that need to be filled out.  Please be aware there is a 10-14 day turnaround time.  You will need to sign a release of information (DIPIKA) form if your paperwork does not come with one.  You may call the Forms Department with any questions at [phone].  Their fax number is [phone].

## 2025-01-05 NOTE — PROGRESS NOTES
Established Neurosurgery Patient    Patient: Alejandra Green  Medical Record Number: [id number]  YOB: 1945  PCP: Erin Lyles MD    Reason for visit: 3-week postoperative visit    HISTORY OF PRESENTING ILLNESS:  Alejandra Green is a pleasant 79 year old female who returns to the neurosurgery clinic today approximately 3 weeks s/p transcervical exposure prior C4-5 fusion, exploration fusion mass, C3-4 and C5-6 ACDF's with 2 separate anterior fixation plates by Dr. Alves on 12/18/2024.  The patient's initial postoperative course was complicated by panic attacks stemming from her collar use, which resulted in an ED visit.  Her  is present today at the office visit.  He reports that initially, she was taking a lot of pain medication, which was making her drowsy and lethargic.  She had called our office for recommendations surrounding the collar, as well as trouble with constipation.  She was prescribed Colace and advised to utilize a soft collar when sitting/lying, but to use the rigid cervical collar when up and ambulating more than a minute or so.  Over the last week, she has seen improvement in her symptoms.  Her preoperative skull pain has resolved.  She will intermittently feel a \"twinge\", but it is not severe in nature.  She notes some neck and trapezius stiffness/discomfort.  Since being overmedicated, she has stopped taking all medications except for her Eliquis for newly diagnosed A-fib and metoprolol.  She will utilize an occasional Tylenol.  She has no new neurologic complaints at this time.  No issues with her incision site.  No constitutional symptoms or symptoms concerning for sepsis.  She does note some discomfort with the rigid cervical collar at different pressure points.  No skin breakdown.  She has a rigid Aspen collar, which she states is more comfortable than the [place] collar.  She is not interested in trialing a [place] J collar.  She is tolerating solids  and liquids without any issues.    Past Medical History:    Back problem    Disorder of thyroid    Essential hypertension    High blood pressure    High cholesterol    Hyperlipidemia    Osteoarthritis    PONV (postoperative nausea and vomiting)    Visual impairment      Past Surgical History:   Procedure Laterality Date    Back surgery      Cholecystectomy      Cystoscopy,insert ureteral stent      Hernia surgery      Hysterectomy      Knee replacement surgery Bilateral     X2    Spine surgery procedure unlisted      Trigger finger release N/A     X4      Family History   Problem Relation Age of Onset    Heart Disease Father     Cancer Mother     Heart Attack Sister       Social History     Socioeconomic History    Marital status:    Tobacco Use    Smoking status: Never    Smokeless tobacco: Never   Vaping Use    Vaping status: Never Used   Substance and Sexual Activity    Alcohol use: No    Drug use: No   Other Topics Concern    Caffeine Concern No    Exercise Yes      Allergies[1]   Current Medications:  Current Outpatient Medications   Medication Sig Dispense Refill    albuterol 108 (90 Base) MCG/ACT Inhalation Aero Soln Inhale 1 puff into the lungs every 4 (four) hours as needed.      ergocalciferol 1.25 MG (61466 UT) Oral Cap       guaiFENesin-codeine 100-10 MG/5ML Oral Solution TAKE 10 ML BY MOUTH DAILY AT NIGHT FOR 7 DAYS AS NEEDED      ondansetron 4 MG Oral Tablet Dispersible Take 1 tablet (4 mg total) by mouth every 4 (four) hours as needed for Nausea. 20 tablet 0    oxyCODONE 5 MG Oral Tab Take 1 tablet (5 mg total) by mouth every 4 (four) hours as needed. 30 tablet 0    docusate sodium 100 MG Oral Cap Take 1 capsule (100 mg total) by mouth 2 (two) times daily. 20 capsule 0    acetaminophen 500 MG Oral Tab Take 2 tablets (1,000 mg total) by mouth every 6 (six) hours as needed.      metoprolol succinate ER 25 MG Oral Tablet 24 Hr Take 1 tablet (25 mg total) by mouth daily. 30 tablet 1    apixaban 5  MG Oral Tab Take 1 tablet (5 mg total) by mouth 2 (two) times daily. 60 tablet 1    methylPREDNISolone 4 MG Oral Tablet Therapy Pack Take as directed on dose pack instructions 21 tablet 0    methocarbamol 500 MG Oral Tab Take 1 tablet (500 mg total) by mouth 3 (three) times daily as needed. 60 tablet 0    acetaminophen 500 MG Oral Tab Take 1 tablet (500 mg total) by mouth every 4 (four) hours as needed. 120 tablet 0    benzonatate 100 MG Oral Cap Take 1 capsule (100 mg total) by mouth 3 (three) times daily as needed for cough. >10 years old 30 capsule 0    denosumab (PROLIA) 60 MG/ML Subcutaneous Solution Prefilled Syringe Subcutaneous      Fluticasone Furoate (FLONASE SENSIMIST) 27.5 MCG/SPRAY Nasal Suspension 1 spray in each nostril Nasally Once a day      Loperamide HCl (IMODIUM A-D) 2 MG Oral Tab Take 1 tablet (2 mg total) by mouth as needed for Diarrhea.      loratadine 10 MG Oral Tab Take 1 tablet (10 mg total) by mouth daily.      Scopolamine 1.5mg TD patch 1mg/3days 1 patch to skin behind the ear as needed Transdermal once every 3 days for 30 days      rosuvastatin 10 MG Oral Tab Take 1 tablet (10 mg total) by mouth daily.      Multiple Vitamin (MULTI-DAY) Oral Tab Take by mouth.      cholecalciferol (VITAMIN D3) 10 MCG (400 UNIT) Oral Tab Take 1 tablet (400 Units total) by mouth daily.      Calcium Carbonate 600 MG Oral Tab Take 1 tablet (600 mg total) by mouth.      Benazepril HCl 5 MG Oral Tab Take 1 tablet (5 mg total) by mouth daily.      Levothyroxine Sodium 75 MCG Oral Tab Take 1 tablet (75 mcg total) by mouth before breakfast.      gabapentin 300 MG Oral Cap Take 1 capsule (300 mg total) by mouth nightly.          REVIEW OF SYSTEMS:  Comprehensive review of systems completed and negative with the exception of aforementioned information in the HPI.     PHYSICAL EXAM:    1/6/2025     10:09 AM      /78   BP Location Left arm   Patient Position Sitting   Cuff Size adult   Pulse 92   Weight 160 lb  (72.6 kg)   Height INCHES 67\"   SpO2 97 %   Pain Score 7 - (Severe)   Pain Loc Shoulder     Wt Readings from Last 6 Encounters:   12/24/24 160 lb (72.6 kg)   12/23/24 165 lb (74.8 kg)   12/06/24 161 lb (73 kg)   10/25/24 162 lb (73.5 kg)   10/24/24 162 lb (73.5 kg)   10/21/24 162 lb 3.2 oz (73.6 kg)        General: Well developed, well nourished, in no acute distress.     Neck: No stridor.  No swelling.  Nontender.  Trachea midline.    Incision: Steri-Strips intact.  Incision healing well.  No erythema, warmth, swelling, gross drainage appreciated.    HEENT: Normocephalic, atraumatic.    Respirations: Non-labored     Neurologic / Musculoskeletal: Awake, alert, and interactive. Recent and remote memory appear intact. Attention span and concentration are appropriate. No dysarthria. Appropriately names objects. Coordination and motor control grossly intact. Patient follows commands briskly and appropriately.     Cervical Spine:    Motor/ Extremities   Deltoid Biceps Triceps    Sensation   R 5/5 5/5 5/5 5/5 Intact to light touch   L 5/5 5/5 5/5 5/5 Intact to light touch       Lumbar Spine:    Motor / Extremities   Hip   flexion Knee   extension Dorsiflexion Plantarflexion   Sensation   R 5/5 5/5 5/5 5/5 Intact to light touch   L 5/5 5/5 5/5 5/5 Intact to light touch     IMAGING:  Postoperative x-rays reviewed in detail with the patient and her  in the room today.    ASSESSMENT / PLAN:    ICD-10-CM   1. Postoperative state  Z98.890      2. S/P cervical spinal fusion  Z98.1      3. Encounter for postoperative wound check  Z48.89      4. Neck pain  M54.2        Alejandra Green returns to the clinic today approximately 3 weeks s/p transcervical exposure prior C4-5 fusion, exploration fusion mass, C3-4 and C5-6 ACDF's with 2 separate anterior fixation plates by Dr. Alves on 12/18/2024.  I recommended she continue to take Tylenol as needed for her neck and trapezius stiffness/pain.  I encouraged her to  utilize the Robaxin as needed as well, as the Tylenol does not help with the pain.  Advised caution with the diazepam which can double with some muscle relaxant, but is also very sedating.  Discussed that oxycodone is also very sedating and can contribute to her constipation, but is available on an as-needed basis if her pain is not well-controlled with the Tylenol and the Robaxin.  She inquired about the Eliquis and metoprolol.  I encouraged them to follow-up with their PCP and cardiologist regarding these medications.  She should continue the rigid cervical collar at all times, with the exception of transitioning to a [place] collar for bathing and a soft OTC collar when sitting/lying for an extended period of time.  It was recommended that she place additional padding at pressure point.  This to help alleviate some of the discomfort of the collar.  Patient is not interested at this time and considering trialing a [place] J collar or a pediatric Aspen collar.  Overall, the Aspen collar fits appropriately.  Advised against driving while collar is in place.  We would like to see her back in 3 weeks with a new set of x-rays for comparison to her baseline imaging.    -Medications Prescribed: None  -Imaging Ordered: Cervical spine AP/lateral to be completed closer to next office visit  -Referrals Placed: None  -Follow up: 1/27/2025 at 1045    Plan was reviewed and discussed in detail with the patient. Patient encouraged to call the office with any questions or concerns of new/worsening neurologic symptoms. Patient demonstrated good understanding and was agreeable with the plan.     Visit time: 35 minutes   Over 50% of that time was spent providing patient education and discussing care plan.    Derek Robin PA-C  Physician Assistant- Neurosurgery   [facility]  1/5/2025, 11:14 AM               [1]   Allergies  Allergen Reactions    Duloxetine Hcl NAUSEA AND VOMITING and OTHER (SEE COMMENTS)     Hydrocodone NAUSEA AND VOMITING    Hydromorphone UNKNOWN and OTHER (SEE COMMENTS)     Unable to recall reactions    Seasonal OTHER (SEE COMMENTS)     RUNNY NOSE    Tramadol UNKNOWN     Pt unable to recall reactions    Adhesive Tape RASH    Biaxin [Clarithromycin] DIARRHEA    Ciprofloxacin NAUSEA ONLY    Wound Dressing Adhesive RASH

## 2025-01-06 ENCOUNTER — OFFICE VISIT (OUTPATIENT)
Dept: SURGERY | Facility: CLINIC | Age: 80
End: 2025-01-06
Payer: MEDICARE

## 2025-01-06 VITALS
WEIGHT: 160 LBS | OXYGEN SATURATION: 97 % | DIASTOLIC BLOOD PRESSURE: 78 MMHG | HEART RATE: 92 BPM | HEIGHT: 67 IN | SYSTOLIC BLOOD PRESSURE: 122 MMHG | BODY MASS INDEX: 25.11 KG/M2

## 2025-01-06 DIAGNOSIS — Z98.890 POSTOPERATIVE STATE: Primary | ICD-10-CM

## 2025-01-06 DIAGNOSIS — M54.2 NECK PAIN: ICD-10-CM

## 2025-01-06 DIAGNOSIS — Z98.1 S/P CERVICAL SPINAL FUSION: ICD-10-CM

## 2025-01-06 DIAGNOSIS — Z48.89 ENCOUNTER FOR POSTOPERATIVE WOUND CHECK: ICD-10-CM

## 2025-01-06 PROCEDURE — 99024 POSTOP FOLLOW-UP VISIT: CPT | Performed by: STUDENT IN AN ORGANIZED HEALTH CARE EDUCATION/TRAINING PROGRAM

## 2025-01-06 NOTE — PROGRESS NOTES
Established patient presents to clinic for a 3 week post operative visit from Transcervical exposure of prior Cervical 4 to 5 fusion, anterior Cervical 3 to 4 and 5 to 6 discectomies and fusion approaching from the left surgery completed on 12/18/2024. Patient states she has discomfort at the back of the head due to cervical aspen collar. She is having bilateral shoulder pain.

## 2025-01-23 NOTE — PROGRESS NOTES
Established Neurosurgery Patient    Patient: Alejandra Green  Medical Record Number: [id number]  YOB: 1945  PCP: Erin Lyles MD    Reason for visit: 6-week postoperative visit    HISTORY OF PRESENTING ILLNESS:  Alejandra Green is a pleasant 79 year old female who returns to the neurosurgery clinic today approximately 6 weeks s/p transcervical exposure prior C4-5 fusion, exploration fusion mass, C3-4 and C5-6 ACDF's with 2 separate anterior fixation plates by Dr. Alves on 12/18/2024.  The patient continues to progress well overall.  She notes that some days are better than others.  She is intermittently utilizing Tylenol for pain relief.  She notes some neck stiffness, as well as pain on the right posterior aspect of her scalp, likely secondary to pressure from the cervical collar.  She also notices areas around her clavicle that are irritated from the collar.  She reports compliance with the collar, however.  She has no new neurologic complaints.  Denies any issues with her incision site.  No constitutional symptoms.  She presents with her  today.    Past Medical History:    Back problem    Disorder of thyroid    Essential hypertension    High blood pressure    High cholesterol    Hyperlipidemia    Osteoarthritis    PONV (postoperative nausea and vomiting)    Visual impairment      Past Surgical History:   Procedure Laterality Date    Back surgery      Cholecystectomy      Cystoscopy,insert ureteral stent      Hernia surgery      Hysterectomy      Knee replacement surgery Bilateral     X2    Spine surgery procedure unlisted      Trigger finger release N/A     X4      Family History   Problem Relation Age of Onset    Heart Disease Father     Cancer Mother     Heart Attack Sister       Social History     Socioeconomic History    Marital status:    Tobacco Use    Smoking status: Never    Smokeless tobacco: Never   Vaping Use    Vaping status: Never Used   Substance and  Sexual Activity    Alcohol use: No    Drug use: No   Other Topics Concern    Caffeine Concern No    Exercise Yes      Allergies[1]   Current Medications:  Current Outpatient Medications   Medication Sig Dispense Refill    albuterol 108 (90 Base) MCG/ACT Inhalation Aero Soln Inhale 1 puff into the lungs every 4 (four) hours as needed.      ergocalciferol 1.25 MG (01257 UT) Oral Cap       guaiFENesin-codeine 100-10 MG/5ML Oral Solution TAKE 10 ML BY MOUTH DAILY AT NIGHT FOR 7 DAYS AS NEEDED      ondansetron 4 MG Oral Tablet Dispersible Take 1 tablet (4 mg total) by mouth every 4 (four) hours as needed for Nausea. 20 tablet 0    oxyCODONE 5 MG Oral Tab Take 1 tablet (5 mg total) by mouth every 4 (four) hours as needed. 30 tablet 0    docusate sodium 100 MG Oral Cap Take 1 capsule (100 mg total) by mouth 2 (two) times daily. 20 capsule 0    acetaminophen 500 MG Oral Tab Take 2 tablets (1,000 mg total) by mouth every 6 (six) hours as needed.      metoprolol succinate ER 25 MG Oral Tablet 24 Hr Take 1 tablet (25 mg total) by mouth daily. 30 tablet 1    apixaban 5 MG Oral Tab Take 1 tablet (5 mg total) by mouth 2 (two) times daily. 60 tablet 1    methylPREDNISolone 4 MG Oral Tablet Therapy Pack Take as directed on dose pack instructions 21 tablet 0    methocarbamol 500 MG Oral Tab Take 1 tablet (500 mg total) by mouth 3 (three) times daily as needed. 60 tablet 0    acetaminophen 500 MG Oral Tab Take 1 tablet (500 mg total) by mouth every 4 (four) hours as needed. 120 tablet 0    benzonatate 100 MG Oral Cap Take 1 capsule (100 mg total) by mouth 3 (three) times daily as needed for cough. >10 years old 30 capsule 0    denosumab (PROLIA) 60 MG/ML Subcutaneous Solution Prefilled Syringe Subcutaneous      Fluticasone Furoate (FLONASE SENSIMIST) 27.5 MCG/SPRAY Nasal Suspension 1 spray in each nostril Nasally Once a day      Loperamide HCl (IMODIUM A-D) 2 MG Oral Tab Take 1 tablet (2 mg total) by mouth as needed for Diarrhea.       loratadine 10 MG Oral Tab Take 1 tablet (10 mg total) by mouth daily.      Scopolamine 1.5mg TD patch 1mg/3days 1 patch to skin behind the ear as needed Transdermal once every 3 days for 30 days      rosuvastatin 10 MG Oral Tab Take 1 tablet (10 mg total) by mouth daily.      Multiple Vitamin (MULTI-DAY) Oral Tab Take by mouth.      cholecalciferol (VITAMIN D3) 10 MCG (400 UNIT) Oral Tab Take 1 tablet (400 Units total) by mouth daily.      Calcium Carbonate 600 MG Oral Tab Take 1 tablet (600 mg total) by mouth.      Benazepril HCl 5 MG Oral Tab Take 1 tablet (5 mg total) by mouth daily.      Levothyroxine Sodium 75 MCG Oral Tab Take 1 tablet (75 mcg total) by mouth before breakfast.      gabapentin 300 MG Oral Cap Take 1 capsule (300 mg total) by mouth nightly.          REVIEW OF SYSTEMS:  Comprehensive review of systems completed and negative with the exception of aforementioned information in the HPI.     PHYSICAL EXAM:  1/27/2025  10:21 AM  /92BP. 138/92. Data is abnormal. Taken on 1/27/25 10:21 [facility] LocationLeft armPatient PositionSittingCuff [id number] lb (72.6 kg)Height [id number]\"  Wt Readings from Last 6 Encounters:   01/06/25 160 lb (72.6 kg)   12/24/24 160 lb (72.6 kg)   12/23/24 165 lb (74.8 kg)   12/06/24 161 lb (73 kg)   10/25/24 162 lb (73.5 kg)   10/24/24 162 lb (73.5 kg)        General: Well developed, well nourished, in no acute distress.     Neck: No stridor.  No swelling.  Nontender.  Trachea midline.    Incision: Healing well.  No erythema, warmth, swelling, gross drainage appreciated.     HEENT: Normocephalic, atraumatic.    Respirations: Non-labored     Neurologic / Musculoskeletal: Awake, alert, and interactive. Recent and remote memory appear intact. Attention span and concentration are appropriate. No dysarthria. Appropriately names objects. Coordination and motor control grossly intact. Patient follows commands briskly and appropriately.     Cervical Spine:    Motor/  Extremities   Deltoid Biceps Triceps    Sensation   R 5/5 5/5 5/5 5/5 Intact to light touch   L 5/5 5/5 5/5 5/5 Intact to light touch       Lumbar Spine:    Motor / Extremities   Hip   flexion Knee   extension Dorsiflexion Plantarflexion   Sensation   R 5/5 5/5 5/5 5/5 Intact to light touch   L 5/5 5/5 5/5 5/5 Intact to light touch     IMAGING:  XR cervical AP/lateral 1/27/2025 reviewed.  Awaiting final radiology report.  No acute abnormalities noted.  Hardware intact.    ASSESSMENT / PLAN:    ICD-10-CM   1. S/P cervical spinal fusion  Z98.1      2. Postoperative state  Z98.890      3. Encounter for postoperative wound check  Z48.89      4. Neck pain  M54.2      5. Occipital pain  R51.9        Alejandra Green returns to the clinic today approximately 6 weeks s/p transcervical exposure prior C4-5 fusion, exploration fusion mass, C3-4 and C5-6 ACDF's with 2 separate anterior fixation plates by Dr. Alves on 12/18/2024.  The patient continues to progress well overall from a surgical standpoint.  She is neurologically stable.  Discussed with Dr. Alves.  Okay to remove the cervical collar at night when sleeping, but should be on during the day at all times.  She can continue to utilize Tylenol to address her discomfort, as well as Robaxin for muscle stiffness.  We will plan to see her back in 6 weeks with a new set of x-rays for comparison to her baseline imaging.    -Medications Prescribed: None  -Imaging Ordered: Cervical spine AP/lateral to be completed closer to next office visit  -Referrals Placed: None  -Follow up: 3/10/2025 at 0800 with Dr. Alves    Plan was reviewed and discussed in detail with the patient. Patient encouraged to call the office with any questions or concerns of new/worsening neurologic symptoms. Patient demonstrated good understanding and was agreeable with the plan.     Visit time: 30 minutes   Over 50% of that time was spent providing patient education and discussing care  plan.    Derek Robin PA-C  Physician Assistant- Neurosurgery   1/23/2025, 3:00 PM                 [1]   Allergies  Allergen Reactions    Duloxetine Hcl NAUSEA AND VOMITING and OTHER (SEE COMMENTS)    Hydrocodone NAUSEA AND VOMITING    Hydromorphone UNKNOWN and OTHER (SEE COMMENTS)     Unable to recall reactions    Seasonal OTHER (SEE COMMENTS)     RUNNY NOSE    Tramadol UNKNOWN     Pt unable to recall reactions    Wheat Bran UNKNOWN    Adhesive Tape RASH    Biaxin [Clarithromycin] DIARRHEA    Ciprofloxacin NAUSEA ONLY    Wound Dressing Adhesive RASH

## 2025-01-27 ENCOUNTER — OFFICE VISIT (OUTPATIENT)
Dept: SURGERY | Facility: CLINIC | Age: 80
End: 2025-01-27
Payer: MEDICARE

## 2025-01-27 ENCOUNTER — HOSPITAL ENCOUNTER (OUTPATIENT)
Dept: GENERAL RADIOLOGY | Facility: HOSPITAL | Age: 80
Discharge: HOME | End: 2025-01-27
Attending: STUDENT IN AN ORGANIZED HEALTH CARE EDUCATION/TRAINING PROGRAM
Payer: MEDICARE

## 2025-01-27 VITALS
HEART RATE: 84 BPM | SYSTOLIC BLOOD PRESSURE: 138 MMHG | WEIGHT: 160 LBS | DIASTOLIC BLOOD PRESSURE: 92 MMHG | BODY MASS INDEX: 25.11 KG/M2 | HEIGHT: 67 IN

## 2025-01-27 DIAGNOSIS — R51.9 OCCIPITAL PAIN: ICD-10-CM

## 2025-01-27 DIAGNOSIS — Z98.1 S/P CERVICAL SPINAL FUSION: ICD-10-CM

## 2025-01-27 DIAGNOSIS — Z48.89 ENCOUNTER FOR POSTOPERATIVE WOUND CHECK: ICD-10-CM

## 2025-01-27 DIAGNOSIS — M54.2 NECK PAIN: ICD-10-CM

## 2025-01-27 DIAGNOSIS — Z98.1 S/P CERVICAL SPINAL FUSION: Primary | ICD-10-CM

## 2025-01-27 DIAGNOSIS — Z98.890 POSTOPERATIVE STATE: ICD-10-CM

## 2025-01-27 PROCEDURE — 99024 POSTOP FOLLOW-UP VISIT: CPT | Performed by: STUDENT IN AN ORGANIZED HEALTH CARE EDUCATION/TRAINING PROGRAM

## 2025-01-27 PROCEDURE — 72040 X-RAY EXAM NECK SPINE 2-3 VW: CPT | Performed by: STUDENT IN AN ORGANIZED HEALTH CARE EDUCATION/TRAINING PROGRAM

## 2025-01-27 NOTE — PROGRESS NOTES
Established patient:  Reason for follow up: 6 week post-op visit   Transcervical exposure of prior Cervical 4 to 5 fusion, anterior Cervical 3 to 4 and 5 to 6 discectomies and fusion approaching from the left L       Numeric Rating Scale: (No Pain) 0  to  10 (Worst Pain)        Pain at Present:  5/10   ; patient describes waves of pain through the day.     Distribution of Pain:    right; denies N/T    Most recent treatments for Current Pain Condition:   Tylenol at 1-2x daily, gabapentin, methocarbamol only as needed      Response to treatment: some relief    New imaging or testing since your last office visit:  Xray cervical done this AM

## 2025-03-10 ENCOUNTER — HOSPITAL ENCOUNTER (OUTPATIENT)
Dept: GENERAL RADIOLOGY | Facility: HOSPITAL | Age: 80
Discharge: HOME | End: 2025-03-10
Attending: STUDENT IN AN ORGANIZED HEALTH CARE EDUCATION/TRAINING PROGRAM
Payer: MEDICARE

## 2025-03-10 ENCOUNTER — OFFICE VISIT (OUTPATIENT)
Dept: SURGERY | Facility: CLINIC | Age: 80
End: 2025-03-10
Payer: MEDICARE

## 2025-03-10 VITALS
WEIGHT: 160 LBS | DIASTOLIC BLOOD PRESSURE: 83 MMHG | HEIGHT: 67 IN | SYSTOLIC BLOOD PRESSURE: 139 MMHG | BODY MASS INDEX: 25.11 KG/M2

## 2025-03-10 DIAGNOSIS — R26.89 IMBALANCE: ICD-10-CM

## 2025-03-10 DIAGNOSIS — M50.30 DEGENERATIVE CERVICAL DISC: ICD-10-CM

## 2025-03-10 DIAGNOSIS — Z98.1 S/P CERVICAL SPINAL FUSION: Primary | ICD-10-CM

## 2025-03-10 DIAGNOSIS — Z98.1 HISTORY OF FUSION OF CERVICAL SPINE: ICD-10-CM

## 2025-03-10 DIAGNOSIS — F41.9 ANXIETY: ICD-10-CM

## 2025-03-10 DIAGNOSIS — M48.02 CERVICAL STENOSIS OF SPINE: ICD-10-CM

## 2025-03-10 DIAGNOSIS — Z98.890 POSTOPERATIVE STATE: ICD-10-CM

## 2025-03-10 DIAGNOSIS — M54.2 NECK PAIN: ICD-10-CM

## 2025-03-10 DIAGNOSIS — Z98.1 S/P CERVICAL SPINAL FUSION: ICD-10-CM

## 2025-03-10 PROCEDURE — 99024 POSTOP FOLLOW-UP VISIT: CPT | Performed by: STUDENT IN AN ORGANIZED HEALTH CARE EDUCATION/TRAINING PROGRAM

## 2025-03-10 PROCEDURE — 72040 X-RAY EXAM NECK SPINE 2-3 VW: CPT | Performed by: STUDENT IN AN ORGANIZED HEALTH CARE EDUCATION/TRAINING PROGRAM

## 2025-03-10 NOTE — PROGRESS NOTES
Patient presents for 12 week post op appt. Sx was 12/18/24.Patient states she is feeling better and better each day. Patient states she has occipital pain' for a couple hours in the morning and then it subsides. Patient also describes some pain in the back of the head where the collar is.       Pain scale today: 0/10; lots of relief from the sx      Updated xray completed this AM
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
## 2025-03-17 ENCOUNTER — OFFICE VISIT (OUTPATIENT)
Dept: PHYSICAL THERAPY | Age: 80
End: 2025-03-17
Attending: STUDENT IN AN ORGANIZED HEALTH CARE EDUCATION/TRAINING PROGRAM
Payer: MEDICARE

## 2025-03-17 DIAGNOSIS — Z98.1 HISTORY OF FUSION OF CERVICAL SPINE: ICD-10-CM

## 2025-03-17 DIAGNOSIS — R26.89 IMBALANCE: ICD-10-CM

## 2025-03-17 DIAGNOSIS — M54.2 NECK PAIN: ICD-10-CM

## 2025-03-17 DIAGNOSIS — M48.02 CERVICAL STENOSIS OF SPINE: ICD-10-CM

## 2025-03-17 DIAGNOSIS — F41.9 ANXIETY: ICD-10-CM

## 2025-03-17 DIAGNOSIS — Z98.1 S/P CERVICAL SPINAL FUSION: Primary | ICD-10-CM

## 2025-03-17 DIAGNOSIS — M50.30 DEGENERATIVE CERVICAL DISC: ICD-10-CM

## 2025-03-17 PROCEDURE — 97112 NEUROMUSCULAR REEDUCATION: CPT

## 2025-03-17 PROCEDURE — 97161 PT EVAL LOW COMPLEX 20 MIN: CPT

## 2025-03-18 NOTE — PROGRESS NOTES
SPINE EVALUATION:     Diagnosis:   S/P cervical spinal fusion (Z98.1)  Neck pain (M54.2)  Anxiety (F41.9)  Imbalance (R26.89)  History of fusion of cervical spine (Z98.1)  Cervical stenosis of spine (M48.02)  Degenerative cervical disc (M50.30) Patient:  Alejandra Green (79 year old, female)        Referring Provider: Gerald Alves  Today's Date   3/17/2025    Precautions:  None   Date of Evaluation: 03/17/25  Next MD visit: tba  Date of Surgery: 12/18/2025     PATIENT SUMMARY   Summary of chief complaints: ongoing daily occiputal pain that radiates down R cervical paraspinal region, headaches. She reports recent neck surgery has decreased her previous sympotms greatly and is pleased with the reults.  History of current condition: pt with chronic headaches, upper cervciao pain and radiating pain R neck, R ramshrn pattern, ocular region pain. Failed injections, PT. Post surgery had to use hard cervical collar x 3 months. Procedure was Transcervical exposure of prior C4-5 fusion, exploration of fusion mass, C3-4 and C5-6 ACDFs using 2 separate anterior fixation plates. Using bone stimulator at posterior neck 4 hours a day.   Pain level: current 4 /10, at best 4 /10, at worst 6 /10  Description of symptoms: deep ache   Occupation: retired   Leisure activities/Hobbies: travels. No regular exercise or recreational activities.   Prior level of function: limited due to neck and head pain  Current limitations: some diffiuclties with turning head, holding head for prolonged periods of time, using arms overhead.  Pt goals: further decrease pain  Red flag signs/symptoms: Pt denies dizziness, drop attacks, dysphagia, dysarthria, diplopia    Alejandra  has a past medical history of Back problem, Disorder of thyroid, Essential hypertension, High blood pressure, High cholesterol, Hyperlipidemia (12/18/2024), Osteoarthritis, .  She  has a past surgical history that includes knee replacement surgery (Bilateral); back  surgery; Trigger finger release (N/A);    ASSESSMENT  Alejandra is a R dominant female who presents to physical therapy evaluation with primary c/o ongoing daily occiputal pain that radiates down R cervical paraspinal region, headaches. She reports recent neck surgery has decreased her previous sympotms greatly and is pleased with the reults..     The results of the objective tests and measures show loss of lower and upper cervcial ROMand strength, loss of UE AROM and strength. Functional deficits include but are not limited to some diffiuclties with turning head, holding head for prolonged periods of time, using arms overhead..     Signs and symptoms are consistent with diagnosis of cervical spine dysfunction following cervical spine fusion. Pt and PT discussed evaluation findings, pathology, POC and HEP.  Pt voiced understanding and performs HEP correctly without reported pain. Skilled Physical Therapy is medically necessary to address the above impairments and reach functional goals.    OBJECTIVE:    Musculoskeletal:  Observation/Posture: upper cervical extension; shoulder shrug      Palpation: mild tenderness along R paraspinals     ROM and Strength:       Cervical ROM     Flex 30     Ext 60    R L     Side bend 5 10     Rotation 20 25   ,   Shoulder   ROM    R L     Flex 150 150     Ext         Abd (C5) 150 150     IR WFL WFL     ER WFL WFL       Flexibility:  UE Flexibility R L     Pec Major min restricted min restricted     Latissimus min restricted min restricted     Bicep min restricted min restricted       Peripheral Neurodynamic: WNL  negative ULTT  for median nerve       Today's Treatment and Response:   Pt education was provided on exam findings, treatment diagnosis, treatment plan, expectations, and prognosis.  Today's Treatment       3/17/2025   Spine Treatment   Neuro Re-Education Supine:  Cranial flexion with chin nod, small roll under neck , cues for no cervical flexion, 10 sec hold x 5  reps    Stand:  Face wall, B UE CKC flexion in neutral to 150, cueing no up trap overuse, x 10 reps        Neuro Re-Educ Minutes 10   Evaluation Minutes 30   Total Time Of Timed Procedures 10   Total Time Of Service-Based Procedures 30   Total Treatment Time 40   HEP As noted above in tx table HO issued        Patient was instructed in and issued a HEP for: As noted above in tx table HO issued    Charges:  PT EVAL:  , NMR  In agreement with evaluation findings and clinical rationale, this evaluation involved LOW COMPLEXITY decision making due to no personal factors/comorbidities, 1-2 body structures involved/activity limitations, and stable symptoms as documented in the evaluation.                                                                         PLAN OF CARE:    Goals: (to be met in 10 visits)   -Improve upper and lower cervical spine segment mobility, improve cervical rotation to 45* each side to ease ability to operate car safely.  -Improve cervical spine strength to WNL as noted with biofeedback cuff readings of 30 mmhg or > with cranial flexion, reduce R neck pain and occiput pain to 3/10 so pt can perform sustained posture tasks with little no restrictions.   -Improve pectoralis and latissimus dorsi muscle length to WNL, shoulder strength WFL, so pt can perform overhead arm tasks with little to no difficulty.   -Independent with progressive HEP     Frequency / Duration: Patient will be seen 1-2x/week or a total of 10  visits over a 90 day period. Treatment will include: Manual Therapy; Neuromuscular Re-education; Therapeutic Exercise; Home Exercise Program instruction    Education or treatment limitation: None   Rehab Potential: good     Neck Disability Index Score  Score: (Patient-Rptd) 38 % (3/13/2025 11:06 AM)      Patient/Family/Caregiver was advised of these findings, precautions, and treatment options and has agreed to actively participate in planning and for this course of care.    Thank you for  your referral. Please co-sign or sign and return this letter via fax as soon as possible to [phone]. If you have any questions, please contact me at Dept: [phone]    Sincerely,  Electronically signed by therapist: Jairon Brito PT  Physician's certification required: Yes  I certify the need for these services furnished under this plan of treatment and while under my care.    X___________________________________________________ Date____________________    Certification From: 3/17/2025  To:6/15/2025

## 2025-03-19 ENCOUNTER — OFFICE VISIT (OUTPATIENT)
Dept: PHYSICAL THERAPY | Age: 80
End: 2025-03-19
Attending: STUDENT IN AN ORGANIZED HEALTH CARE EDUCATION/TRAINING PROGRAM
Payer: MEDICARE

## 2025-03-19 PROCEDURE — 97112 NEUROMUSCULAR REEDUCATION: CPT

## 2025-03-19 PROCEDURE — 97140 MANUAL THERAPY 1/> REGIONS: CPT

## 2025-03-19 PROCEDURE — 97110 THERAPEUTIC EXERCISES: CPT

## 2025-03-19 NOTE — PROGRESS NOTES
Patient: Alejandra Green (79 year old, female) Referring Provider:  Insurance:   Diagnosis: S/P cervical spinal fusion (Z98.1)  Neck pain (M54.2)  Anxiety (F41.9)  Imbalance (R26.89)  History of fusion of cervical spine (Z98.1)  Cervical stenosis of spine (M48.02)  Degenerative cervical disc (M50.30) Gerald Joselyn  MEDICARE   Date of Surgery: 12/18/2025 Next MD visit:  [facility]   Precautions:  None tba Referral Information:    Date of Evaluation: Req: 0, Auth: 0, Exp:     03/17/25 POC Auth Visits:  10       Today's Date   3/19/2025    Subjective          Pain: 4/10     Objective  R and L rotation 25 each, L and R shoulder flexion 150 each          Assessment  Pt remains with residule R neck sympotms tht were chronic prior to surgery but also well improved since surgery per pt. Pt reproting radiating R neck pain with R upper cervical mobilizations, symptoms brief and quickly self resolve.  Shoulders with some ROM retrictions that jacques need to be addressed so tx with UE's can progress.  R and L rotation improved to 35 each side following tx.    Goals (to be met in 10 visits)   -Improve upper and lower cervical spine segment mobility, improve cervical rotation to 45* each side to ease ability to operate car safely.  -Improve cervical spine strength to WNL as noted with biofeedback cuff readings of 30 mmhg or > with cranial flexion, reduce R neck pain and occiput pain to 3/10 so pt can perform sustained posture tasks with little no restrictions.   -Improve pectoralis and latissimus dorsi muscle length to WNL, shoulder strength WFL, so pt can perform overhead arm tasks with little to no difficulty.   -Independent with progressive HEP         Plan  initiate shoulder mobilizations to increase flexiona nd abduction, add lift off and low trap setting to previous shoulder wall slides.    Treatment Last 4 Visits       3/19/2025   PT Treatment   Treatment Day 2          3/17/2025 3/19/2025   Spine Treatment    Treatment Day  2   Therapeutic Exercise  Supine:  R and L shoulder PROM IR, ER and flexion  R and L cervical PROM L and R rotation to end range  R and L cervical PROM L and R side bend to end range   Neuro Re-Education Supine:  Cranial flexion with chin nod, small roll under neck , cues for no cervical flexion, 10 sec hold x 5 reps    Stand:  Face wall, B UE CKC flexion in neutral to 150, cueing no up trap overuse, x 10 reps      Supine:  Biofeedback cuff at c spine, base 20 mmhg, perform  cranial flexion with chin nod,  cues for no cervical flexion, 10 sec hold x 7 reps, progress to 26-28 mmhg. Cont HEP    -as above with alternating shoulder flexion, maintain 24-26mmhg x 5 reps each     Stand:  Face wall, B UE CKC flexion in neutral to 150, cueing no up trap overuse, x 10 reps cont HEP    -as above add lift off with low trap setting x 2 each      Manual Therapy  Supine:  Grade 2,3 up and forward glides to C2/C3 L and R rotation  Grade 2-3 lateral glides C1 on CO  Grade 2 oscillation to C7 and T1 with head rest in end range rot each dasia x 1 min  Occipital release x 1 min   Therapeutic Exercise Minutes  10   Neuro Re-Educ Minutes 10 20   Manual Therapy Minutes  15   Evaluation Minutes 30    Total Time Of Timed Procedures 10 45   Total Time Of Service-Based Procedures 30 0   Total Treatment Time 40 45   HEP As noted above in tx table HO issued Cont with initial HEP as noted above        HEP  Cont with initial HEP as noted above    Charges  man 1 TE 1 NMR 1

## 2025-03-24 ENCOUNTER — OFFICE VISIT (OUTPATIENT)
Dept: PHYSICAL THERAPY | Age: 80
End: 2025-03-24
Attending: STUDENT IN AN ORGANIZED HEALTH CARE EDUCATION/TRAINING PROGRAM
Payer: MEDICARE

## 2025-03-24 PROCEDURE — 97140 MANUAL THERAPY 1/> REGIONS: CPT

## 2025-03-24 PROCEDURE — 97112 NEUROMUSCULAR REEDUCATION: CPT

## 2025-03-24 PROCEDURE — 97110 THERAPEUTIC EXERCISES: CPT

## 2025-03-24 NOTE — PROGRESS NOTES
Patient: Alejandra Green (79 year old, female) Referring Provider:  Insurance:   Diagnosis: S/P cervical spinal fusion (Z98.1)  Neck pain (M54.2)  Anxiety (F41.9)  Imbalance (R26.89)  History of fusion of cervical spine (Z98.1)  Cervical stenosis of spine (M48.02)  Degenerative cervical disc (M50.30) Gerald Joselyn  MEDICARE   Date of Surgery: 12/18/2025 Next MD visit:  [facility]   Precautions:  None tba Referral Information:    Date of Evaluation: Req: 0, Auth: 0, Exp:     03/17/25 POC Auth Visits:  10       Today's Date   3/24/2025    Subjective  continues with low grade R neck pain       Pain: 3/10     Objective  R and L rotation 30 each, L and R shoulder flexion 150 each          Assessment  some carryover with cervcial rotation AROM from last tx. Thaat was furhter improved to 35*, same as previous tx. No radaiting symptoms with upper cervical mobes as she did last session. Deferred adding low trap setting to HEP until shoulder flexion ROM improves. Initiated GH joint mobes with no pain. shoulder flexion improved to 155 after tx    Goals (to be met in 10 visits)   -Improve upper and lower cervical spine segment mobility, improve cervical rotation to 45* each side to ease ability to operate car safely.  -Improve cervical spine strength to WNL as noted with biofeedback cuff readings of 30 mmhg or > with cranial flexion, reduce R neck pain and occiput pain to 3/10 so pt can perform sustained posture tasks with little no restrictions.   -Improve pectoralis and latissimus dorsi muscle length to WNL, shoulder strength WFL, so pt can perform overhead arm tasks with little to no difficulty.   -Independent with progressive HEP             Plan  add lift off and low trap setting to previous shoulder wall slides if appropriate..    Treatment Last 4 Visits       3/19/2025 3/24/2025   PT Treatment   Treatment Day 2 3          3/17/2025 3/19/2025 3/24/2025   Spine Treatment   Treatment Day  2 3   Therapeutic Exercise   Supine:  R and L shoulder PROM IR, ER and flexion  R and L cervical PROM L and R rotation to end range  R and L cervical PROM L and R side bend to end range Supine:  R and L shoulder PROM IR, ER and flexion  R and L cervical PROM L and R rotation to end range  R and L cervical PROM L and R side bend to end range    Sit:  L and R cervical rotation AROM with 5 sec hold x 3 each     Neuro Re-Education Supine:  Cranial flexion with chin nod, small roll under neck , cues for no cervical flexion, 10 sec hold x 5 reps    Stand:  Face wall, B UE CKC flexion in neutral to 150, cueing no up trap overuse, x 10 reps      Supine:  Biofeedback cuff at c spine, base 20 mmhg, perform  cranial flexion with chin nod,  cues for no cervical flexion, 10 sec hold x 7 reps, progress to 26-28 mmhg. Cont HEP    -as above with alternating shoulder flexion, maintain 24-26mmhg x 5 reps each     Stand:  Face wall, B UE CKC flexion in neutral to 150, cueing no up trap overuse, x 10 reps cont HEP    -as above add lift off with low trap setting x 2 each    Supine:  Biofeedback cuff at c spine, base 20 mmhg, perform  cranial flexion with chin nod,  cues for no cervical flexion, 10 sec hold x 7 reps, progress to 30 mmhg. Cont HEP     -as above with alternating shoulder flexion, maintain 28-30 mmhg x 5 reps each    Supine cervical flexion AAROM, 25%,  with cjin nod maintained x 10 reps      Stand:  Face wall, B UE CKC flexion in neutral to 150, cueing no up trap overuse, x 10 reps cont HEP     -as above add lift off with low trap setting x 5 each     Manual Therapy  Supine:  Grade 2,3 up and forward glides to C2/C3 L and R rotation  Grade 2-3 lateral glides C1 on CO  Grade 2 oscillation to C7 and T1 with head rest in end range rot each dasia x 1 min  Occipital release x 1 min Supine:  Grade 2,3 R an L GH post an infer GH joint mobes x 30 sec bout each    STM to DTM to R and L ant and med scalenes and SCM xx 5min each side     Therapeutic Exercise  Minutes  10 10   Neuro Re-Educ Minutes 10 20 20   Manual Therapy Minutes  15 15   Evaluation Minutes 30     Total Time Of Timed Procedures 10 45 45   Total Time Of Service-Based Procedures 30 0 0   Total Treatment Time 40 45 45   HEP As noted above in tx table HO issued Cont with initial HEP as noted above Cont with previous HEP as noted above        HEP  Cont with previous HEP as noted above    Charges  TE Man NMR

## 2025-03-26 ENCOUNTER — OFFICE VISIT (OUTPATIENT)
Dept: PHYSICAL THERAPY | Age: 80
End: 2025-03-26
Attending: STUDENT IN AN ORGANIZED HEALTH CARE EDUCATION/TRAINING PROGRAM
Payer: MEDICARE

## 2025-03-26 PROCEDURE — 97112 NEUROMUSCULAR REEDUCATION: CPT

## 2025-03-26 PROCEDURE — 97140 MANUAL THERAPY 1/> REGIONS: CPT

## 2025-03-26 PROCEDURE — 97110 THERAPEUTIC EXERCISES: CPT

## 2025-03-26 NOTE — PROGRESS NOTES
Patient: Alejandra Green (79 year old, female) Referring Provider:  Insurance:   Diagnosis: S/P cervical spinal fusion (Z98.1)  Neck pain (M54.2)  Anxiety (F41.9)  Imbalance (R26.89)  History of fusion of cervical spine (Z98.1)  Cervical stenosis of spine (M48.02)  Degenerative cervical disc (M50.30) Gerald Alves  MEDICARE   Date of Surgery: 12/18/2025 Next MD visit:  [facility]   Precautions:  None tba Referral Information:    Date of Evaluation: Req: 0, Auth: 0, Exp:     03/17/25 POC Auth Visits:  10       Today's Date   3/26/2025    Subjective  pt reports her headaches are greatly reduced since surgery, pleased with outcome. R neck pain more of an irritation now, better. Some HEP being down. Pt sedenatary most of the day.       Pain: 3/10     Objective  R and L rotation 35 each, L and R shoulder flexion 150 each        Assessment  Good carryover with cervcial rotation AROM from last tx, no loss. pt with 35* before and after tx, goal 1. Progressed program and HEP with upper trunk and shoulder mobility and pec/lat mobility for overhead arm raising, goal 3. All tolerated well, shoulder flexon at 170 PROM, 155 AROM. HO issued for additonal HEP.  Cervcial spine strength > 30 mmhg, goal 2. zimprovement allowed progression of cervcial strength program.HO issued for additonal HEP.    Goals (to be met in 10 visits)   -Improve upper and lower cervical spine segment mobility, improve cervical rotation to 45* each side to ease ability to operate car safely.  -Improve cervical spine strength to WNL as noted with biofeedback cuff readings of 30 mmhg or > with cranial flexion, reduce R neck pain and occiput pain to 3/10 so pt can perform sustained posture tasks with little no restrictions.   -Improve pectoralis and latissimus dorsi muscle length to WNL, shoulder strength WFL, so pt can perform overhead arm tasks with little to no difficulty.   -Independent with progressive HEP                 Plan  check latest HEP,  address goals.    Treatment Last 4 Visits  Treatment Day: 4       3/17/2025 3/19/2025 3/24/2025 3/26/2025   Spine Treatment   Therapeutic Exercise  Supine:  R and L shoulder PROM IR, ER and flexion  R and L cervical PROM L and R rotation to end range  R and L cervical PROM L and R side bend to end range Supine:  R and L shoulder PROM IR, ER and flexion  R and L cervical PROM L and R rotation to end range  R and L cervical PROM L and R side bend to end range    Sit:  L and R cervical rotation AROM with 5 sec hold x 3 each   Supine:  R and L shoulder PROM horiz add, IR, ER and flexion  R and L cervical PROM L and R rotation to end range  R and L cervical PROM L and R side bend to end range     Neuro Re-Education Supine:  Cranial flexion with chin nod, small roll under neck , cues for no cervical flexion, 10 sec hold x 5 reps    Stand:  Face wall, B UE CKC flexion in neutral to 150, cueing no up trap overuse, x 10 reps      Supine:  Biofeedback cuff at c spine, base 20 mmhg, perform  cranial flexion with chin nod,  cues for no cervical flexion, 10 sec hold x 7 reps, progress to 26-28 mmhg. Cont HEP    -as above with alternating shoulder flexion, maintain 24-26mmhg x 5 reps each     Stand:  Face wall, B UE CKC flexion in neutral to 150, cueing no up trap overuse, x 10 reps cont HEP    -as above add lift off with low trap setting x 2 each    Supine:  Biofeedback cuff at c spine, base 20 mmhg, perform  cranial flexion with chin nod,  cues for no cervical flexion, 10 sec hold x 7 reps, progress to 30 mmhg. Cont HEP     -as above with alternating shoulder flexion, maintain 28-30 mmhg x 5 reps each    Supine cervical flexion AAROM, 25%,  with cjin nod maintained x 10 reps      Stand:  Face wall, B UE CKC flexion in neutral to 150, cueing no up trap overuse, x 10 reps cont HEP     -as above add lift off with low trap setting x 5 each   Supine:  Biofeedback cuff at c spine, base 20 mmhg, perform  cranial flexion with chin nod,   cues for no cervical flexion, 10 sec hold x 5 reps, progress to 32-34 mmhg. discharge HEP    Supine cervical flexion AAROM, 10%,  with chin nod maintained x 10 reps   -2nd set no assist 5 reps add HEP    Hook lying:  Upper trunk rotation with B UE rotation (open book, x 5 reps each side add HEP     Stand:  Face wall, B UE CKC flexion in neutral to 150, cueing no up trap overuse, x 10 reps cont HEP     -as above add lift off with low trap setting x 5 each add HEP   Manual Therapy  Supine:  Grade 2,3 up and forward glides to C2/C3 L and R rotation  Grade 2-3 lateral glides C1 on CO  Grade 2 oscillation to C7 and T1 with head rest in end range rot each dasia x 1 min  Occipital release x 1 min Supine:  Grade 2,3 R an L GH post an infer GH joint mobes x 30 sec bout each    STM to DTM to R and L ant and med scalenes and SCM xx 5min each side   Supine:  Grade,3,4 R and L GH post an infer GH joint mobes x 30 sec bout each     Therapeutic Exercise Minutes  10 10 10   Neuro Re-Educ Minutes 10 20 20 35   Manual Therapy Minutes  15 15 10   Evaluation Minutes 30      Total Time Of Timed Procedures 10 45 45 55   Total Time Of Service-Based Procedures 30 0 0 0   Total Treatment Time 40 45 45 55   HEP As noted above in tx table HO issued Cont with initial HEP as noted above Cont with previous HEP as noted above Upper trunk rotation and cervical strength exercises as noted above, HO issued.         HEP  Upper trunk rotation and cervical strength exercises as noted above, HO issued.     Charges  Man TE NMR 2

## 2025-04-03 ENCOUNTER — OFFICE VISIT (OUTPATIENT)
Dept: PHYSICAL THERAPY | Age: 80
End: 2025-04-03
Attending: STUDENT IN AN ORGANIZED HEALTH CARE EDUCATION/TRAINING PROGRAM
Payer: MEDICARE

## 2025-04-03 PROCEDURE — 97110 THERAPEUTIC EXERCISES: CPT

## 2025-04-03 PROCEDURE — 97140 MANUAL THERAPY 1/> REGIONS: CPT

## 2025-04-03 PROCEDURE — 97112 NEUROMUSCULAR REEDUCATION: CPT

## 2025-04-03 NOTE — PROGRESS NOTES
Patient: Alejandra Green (79 year old, female) Referring Provider:  Insurance:   Diagnosis: S/P cervical spinal fusion (Z98.1)  Neck pain (M54.2)  Anxiety (F41.9)  Imbalance (R26.89)  History of fusion of cervical spine (Z98.1)  Cervical stenosis of spine (M48.02)  Degenerative cervical disc (M50.30) Gerald Alves  MEDICARE   Date of Surgery: 12/18/2025 Next MD visit:  [facility]   Precautions:  None tba Referral Information:    Date of Evaluation: Req: 0, Auth: 0, Exp:     03/17/25 POC Auth Visits:  10       Today's Date   4/3/2025    Subjective  Pt noticing slow but somewhat steady pain relief of her residual head and neck pain.       Pain: 3/10     Objective  R and L rotation 35 each, L and R shoulder flexion 150 each         Assessment  Pt with 35* before and ~ 40* after tx, goal 1. Previously progressed program and HEP with upper trunk and shoulder mobility and pec/lat mobility for overhead arm raising. That was firther progressed today with shoulder AAROM in supine and standing (replace UE wall slides and lift offs) to furhter progress pt towards goal 3. All tolerated well, shoulder flexon at 170 PROM, 155 AROM. HO issued for additonal HEP.  Cervcial spine strength > 30 mmhg, goal 2.  Pt independnet withcurrent HEP, all goals being addressed.    Goals (to be met in 10 visits)   -Improve upper and lower cervical spine segment mobility, improve cervical rotation to 45* each side to ease ability to operate car safely.  -Improve cervical spine strength to WNL as noted with biofeedback cuff readings of 30 mmhg or > with cranial flexion, reduce R neck pain and occiput pain to 3/10 so pt can perform sustained posture tasks with little no restrictions.   -Improve pectoralis and latissimus dorsi muscle length to WNL, shoulder strength WFL, so pt can perform overhead arm tasks with little to no difficulty.   -Independent with progressive HEP                     Plan  check latest HEP, address  goals.    Treatment Last 4 Visits  Treatment Day: 5       3/19/2025 3/24/2025 3/26/2025 4/3/2025   Spine Treatment   Therapeutic Exercise Supine:  R and L shoulder PROM IR, ER and flexion  R and L cervical PROM L and R rotation to end range  R and L cervical PROM L and R side bend to end range Supine:  R and L shoulder PROM IR, ER and flexion  R and L cervical PROM L and R rotation to end range  R and L cervical PROM L and R side bend to end range    Sit:  L and R cervical rotation AROM with 5 sec hold x 3 each   Supine:  R and L shoulder PROM horiz add, IR, ER and flexion  R and L cervical PROM L and R rotation to end range  R and L cervical PROM L and R side bend to end range   Supine:  R and L shoulder PROM horiz add, IR, ER and flexion  R and L cervical PROM L and R rotation to end range  R and L cervical PROM L and R side bend to end range     Neuro Re-Education Supine:  Biofeedback cuff at c spine, base 20 mmhg, perform  cranial flexion with chin nod,  cues for no cervical flexion, 10 sec hold x 7 reps, progress to 26-28 mmhg. Cont HEP    -as above with alternating shoulder flexion, maintain 24-26mmhg x 5 reps each     Stand:  Face wall, B UE CKC flexion in neutral to 150, cueing no up trap overuse, x 10 reps cont HEP    -as above add lift off with low trap setting x 2 each    Supine:  Biofeedback cuff at c spine, base 20 mmhg, perform  cranial flexion with chin nod,  cues for no cervical flexion, 10 sec hold x 7 reps, progress to 30 mmhg. Cont HEP     -as above with alternating shoulder flexion, maintain 28-30 mmhg x 5 reps each    Supine cervical flexion AAROM, 25%,  with cjin nod maintained x 10 reps      Stand:  Face wall, B UE CKC flexion in neutral to 150, cueing no up trap overuse, x 10 reps cont HEP     -as above add lift off with low trap setting x 5 each   Supine:  Biofeedback cuff at c spine, base 20 mmhg, perform  cranial flexion with chin nod,  cues for no cervical flexion, 10 sec hold x 5 reps,  progress to 32-34 mmhg. discharge HEP    Supine cervical flexion AAROM, 10%,  with chin nod maintained x 10 reps   -2nd set no assist 5 reps add HEP    Hook lying:  Upper trunk rotation with B UE rotation (open book, x 5 reps each side add HEP     Stand:  Face wall, B UE CKC flexion in neutral to 150, cueing no up trap overuse, x 10 reps cont HEP     -as above add lift off with low trap setting x 5 each add HEP Supine:  Biofeedback cuff at c spine, base 20 mmhg, perform  cranial flexion with chin nod,  cues for no cervical flexion, 10 sec hold x 5 reps, progress to 34 mmhg.     Supine cervical flexion AROM,with chin nod maintained x 10 reps  HEP    Hook lying:  Upper trunk rotation with B UE rotation (open book), x 5 reps each side HEP    B shoulder flexion with wand  15 reps add HEP     Stand:  B shoulder flexion with wand  10 reps add HEP    Face wall, B UE CKC flexion in neutral to 150,  lift off with low trap setting x 5 each discharge HEP   Manual Therapy Supine:  Grade 2,3 up and forward glides to C2/C3 L and R rotation  Grade 2-3 lateral glides C1 on CO  Grade 2 oscillation to C7 and T1 with head rest in end range rot each dasia x 1 min  Occipital release x 1 min Supine:  Grade 2,3 R an L GH post an infer GH joint mobes x 30 sec bout each    STM to DTM to R and L ant and med scalenes and SCM xx 5min each side   Supine:  Grade,3,4 R and L GH post an infer GH joint mobes x 30 sec bout each   Supine:  Grade,3,4 R and L GH post an infer GH joint mobes x 30 sec bout each  Grade 2,3 upper cervical rotation mobes to each side     Therapeutic Exercise Minutes 10 10 10 10   Neuro Re-Educ Minutes 20 20 35 25   Manual Therapy Minutes 15 15 10 10   Total Time Of Timed Procedures 45 45 55 45   Total Time Of Service-Based Procedures 0 0 0 0   Total Treatment Time 45 45 55 45   HEP Cont with initial HEP as noted above Cont with previous HEP as noted above Upper trunk rotation and cervical strength exercises as noted above, HO  issued.  Supine:  Grade,3,4 R and L GH post an infer GH joint mobes x 30 sec bout each          HEP  Supine:  Grade,3,4 R and L GH post an infer GH joint mobes x 30 sec bout each      Charges  man TE NMR 2

## 2025-04-08 ENCOUNTER — OFFICE VISIT (OUTPATIENT)
Dept: PHYSICAL THERAPY | Age: 80
End: 2025-04-08
Attending: STUDENT IN AN ORGANIZED HEALTH CARE EDUCATION/TRAINING PROGRAM
Payer: MEDICARE

## 2025-04-08 PROCEDURE — 97112 NEUROMUSCULAR REEDUCATION: CPT

## 2025-04-08 PROCEDURE — 97110 THERAPEUTIC EXERCISES: CPT

## 2025-04-08 PROCEDURE — 97140 MANUAL THERAPY 1/> REGIONS: CPT

## 2025-04-08 NOTE — PROGRESS NOTES
Patient: Alejandra Green (79 year old, female) Referring Provider:  Insurance:   Diagnosis: S/P cervical spinal fusion (Z98.1)  Neck pain (M54.2)  Anxiety (F41.9)  Imbalance (R26.89)  History of fusion of cervical spine (Z98.1)  Cervical stenosis of spine (M48.02)  Degenerative cervical disc (M50.30) Gerald Alves  MEDICARE   Date of Surgery: 12/18/2025 Next MD visit:  [facility]   Precautions:  None tba Referral Information:    Date of Evaluation: Req: 0, Auth: 0, Exp:     03/17/25 POC Auth Visits:  10       Today's Date   4/8/2025    Subjective  Pt noticing slow but somewhat steady pain relief of her residual head and neck pain.  Performing latest HEP, no issues. Feels better after HEP.Pt questions if  strength is satisfactory, had chronic hand arthrits, weakness.       Pain: 3/10     Objective   strength R 28.4 lb  L 23.4 lb          Assessment  Cervical rotation most likely at base line, MMI at this time. Has 35-40* each side, firm end feel. Pt slowly decreasing with her residual R neck pain and R occiptal region and frontal region pain. Those symptoms can reproduced during upper cervcical mobilizations. Quickly resolve.   strenth tested per pt request, showss dominat side slightly more but overall B decrease. Issued yellow putty for  strength.    Goals (to be met in 10 visits)   -Improve upper and lower cervical spine segment mobility, improve cervical rotation to 45* each side to ease ability to operate car safely.  -Improve cervical spine strength to WNL as noted with biofeedback cuff readings of 30 mmhg or > with cranial flexion, reduce R neck pain and occiput pain to 3/10 so pt can perform sustained posture tasks with little no restrictions.   -Improve pectoralis and latissimus dorsi muscle length to WNL, shoulder strength WFL, so pt can perform overhead arm tasks with little to no difficulty.   -Independent with progressive HEP                         Plan  progress  accordingly.    Treatment Last 4 Visits  Treatment Day: 6       3/24/2025 3/26/2025 4/3/2025 4/8/2025   Spine Treatment   Therapeutic Exercise Supine:  R and L shoulder PROM IR, ER and flexion  R and L cervical PROM L and R rotation to end range  R and L cervical PROM L and R side bend to end range    Sit:  L and R cervical rotation AROM with 5 sec hold x 3 each   Supine:  R and L shoulder PROM horiz add, IR, ER and flexion  R and L cervical PROM L and R rotation to end range  R and L cervical PROM L and R side bend to end range   Supine:  R and L shoulder PROM horiz add, IR, ER and flexion  R and L cervical PROM L and R rotation to end range  R and L cervical PROM L and R side bend to end range   Supine:  R and L shoulder PROM horiz add, IR, ER and flexion  R and L cervical PROM L and R rotation to end range  R and L cervical PROM L and R side bend to end range    Yellow putty with various digit and gripping exercises, add HEP   Neuro Re-Education Supine:  Biofeedback cuff at c spine, base 20 mmhg, perform  cranial flexion with chin nod,  cues for no cervical flexion, 10 sec hold x 7 reps, progress to 30 mmhg. Cont HEP     -as above with alternating shoulder flexion, maintain 28-30 mmhg x 5 reps each    Supine cervical flexion AAROM, 25%,  with cjin nod maintained x 10 reps      Stand:  Face wall, B UE CKC flexion in neutral to 150, cueing no up trap overuse, x 10 reps cont HEP     -as above add lift off with low trap setting x 5 each   Supine:  Biofeedback cuff at c spine, base 20 mmhg, perform  cranial flexion with chin nod,  cues for no cervical flexion, 10 sec hold x 5 reps, progress to 32-34 mmhg. discharge HEP    Supine cervical flexion AAROM, 10%,  with chin nod maintained x 10 reps   -2nd set no assist 5 reps add HEP    Hook lying:  Upper trunk rotation with B UE rotation (open book, x 5 reps each side add HEP     Stand:  Face wall, B UE CKC flexion in neutral to 150, cueing no up trap overuse, x 10 reps  cont HEP     -as above add lift off with low trap setting x 5 each add HEP Supine:  Biofeedback cuff at c spine, base 20 mmhg, perform  cranial flexion with chin nod,  cues for no cervical flexion, 10 sec hold x 5 reps, progress to 34 mmhg.     Supine cervical flexion AROM,with chin nod maintained x 10 reps  HEP    Hook lying:  Upper trunk rotation with B UE rotation (open book), x 5 reps each side HEP    B shoulder flexion with wand  15 reps add HEP     Stand:  B shoulder flexion with wand  10 reps add HEP    Face wall, B UE CKC flexion in neutral to 150,  lift off with low trap setting x 5 each discharge HEP Supine:  Biofeedback cuff at c spine, base 20 mmhg, perform  cranial flexion with chin nod,  cues for no cervical flexion, 10 sec hold x 5 reps, progress to 34 mmhg.     Supine cervical flexion AROM,with chin nod maintained x 10 reps  HEP    Hook lying:  Upper trunk rotation with B UE rotation (open book), x 5 reps each side HEP    B shoulder flexion with wand  15 reps  HEP     Stand:  B shoulder flexion with wand  10 reps  HEP     Manual Therapy Supine:  Grade 2,3 R an L GH post an infer GH joint mobes x 30 sec bout each    STM to DTM to R and L ant and med scalenes and SCM xx 5min each side   Supine:  Grade,3,4 R and L GH post an infer GH joint mobes x 30 sec bout each   Supine:  Grade,3,4 R and L GH post an infer GH joint mobes x 30 sec bout each  Grade 2,3 upper cervical rotation mobes to each side   Supine:  Grade,3,4 R and L GH post an infer GH joint mobes x 30 sec bout each  Grade 2,3 upper cervical rotation mobes to each side     Therapeutic Exercise Minutes 10 10 10 10   Neuro Re-Educ Minutes 20 35 25 20   Manual Therapy Minutes 15 10 10 10   Total Time Of Timed Procedures 45 55 45 40   Total Time Of Service-Based Procedures 0 0 0 0   Total Treatment Time 45 55 45 40   HEP Cont with previous HEP as noted above Upper trunk rotation and cervical strength exercises as noted above, HO issued.   Supine:  Grade,3,4 R and L GH post an infer GH joint mobes x 30 sec bout each   Yellow putty for  and digit exercises.        HEP  Yellow putty for  and digit exercises.      Charges  TE man NMR

## 2025-04-10 ENCOUNTER — OFFICE VISIT (OUTPATIENT)
Dept: PHYSICAL THERAPY | Age: 80
End: 2025-04-10
Attending: STUDENT IN AN ORGANIZED HEALTH CARE EDUCATION/TRAINING PROGRAM
Payer: MEDICARE

## 2025-04-10 PROCEDURE — 97112 NEUROMUSCULAR REEDUCATION: CPT

## 2025-04-10 PROCEDURE — 97110 THERAPEUTIC EXERCISES: CPT

## 2025-04-10 NOTE — PROGRESS NOTES
Patient: Alejandra Green (79 year old, female) Referring Provider:  Insurance:   Diagnosis: S/P cervical spinal fusion (Z98.1)  Neck pain (M54.2)  Anxiety (F41.9)  Imbalance (R26.89)  History of fusion of cervical spine (Z98.1)  Cervical stenosis of spine (M48.02)  Degenerative cervical disc (M50.30) Gerald Alves  MEDICARE   Date of Surgery: 12/18/2025 Next MD visit:  [facility]   Precautions:  None tba Referral Information:    Date of Evaluation: Req: 0, Auth: 0, Exp:     03/17/25 POC Auth Visits:  10       Today's Date   4/10/2025    Subjective  No new changes,some minor R neck soreness today whihcis chronic. Performing latest HEP, no issued other then some difficulty due to tissue restrictions.       Pain: 2/10     Objective  shoulder flexion AROM ~ 155-160 each in standing          Assessment  Tx today focused on further improving pec and lat muscle length and shoulder AROM to allow for improved ease of overhead arm flexion with activites w/o excessive difficulty or neck pain which pt was able to do following tx, goal 3 addressed. Cervical rotation AROM and PROM near equal at 40* each, may not attain goals of 45*.    Goals (to be met in 10 visits)   -Improve upper and lower cervical spine segment mobility, improve cervical rotation to 45* each side to ease ability to operate car safely.  -Improve cervical spine strength to WNL as noted with biofeedback cuff readings of 30 mmhg or > with cranial flexion, reduce R neck pain and occiput pain to 3/10 so pt can perform sustained posture tasks with little no restrictions.   -Improve pectoralis and latissimus dorsi muscle length to WNL, shoulder strength WFL, so pt can perform overhead arm tasks with little to no difficulty.   -Independent with progressive HEP                             Plan  Progress HEP to include exercise as done today for shoulder elevation.    Treatment Last 4 Visits  Treatment Day: 7       3/26/2025 4/3/2025 4/8/2025 4/10/2025   Spine  Treatment   Therapeutic Exercise Supine:  R and L shoulder PROM horiz add, IR, ER and flexion  R and L cervical PROM L and R rotation to end range  R and L cervical PROM L and R side bend to end range   Supine:  R and L shoulder PROM horiz add, IR, ER and flexion  R and L cervical PROM L and R rotation to end range  R and L cervical PROM L and R side bend to end range   Supine:  R and L shoulder PROM horiz add, IR, ER and flexion  R and L cervical PROM L and R rotation to end range  R and L cervical PROM L and R side bend to end range    Yellow putty with various digit and gripping exercises, add HEP Sit:  UBE B UE CW and CCW 1 min bouts  level 5  6 min     Supine:   Open book stretch x 10 each side cont HEP    B UE in 90/90 position with pillow under each arm , perform overhead slides,    Cervical PROM rotation x 10 each  Cervical flexion with chin nod maintained x 5 2 sets     Stand with back to wall:  :-B UE shoulder flexion AROM with wand x 20 reps cont HEP  -alternating shoulder flexion AROM to 150-160 x 12 each   -alternating elbow flexion with 4 lb wts x 15 each    Neuro Re-Education Supine:  Biofeedback cuff at c spine, base 20 mmhg, perform  cranial flexion with chin nod,  cues for no cervical flexion, 10 sec hold x 5 reps, progress to 32-34 mmhg. discharge HEP    Supine cervical flexion AAROM, 10%,  with chin nod maintained x 10 reps   -2nd set no assist 5 reps add HEP    Hook lying:  Upper trunk rotation with B UE rotation (open book, x 5 reps each side add HEP     Stand:  Face wall, B UE CKC flexion in neutral to 150, cueing no up trap overuse, x 10 reps cont HEP     -as above add lift off with low trap setting x 5 each add HEP Supine:  Biofeedback cuff at c spine, base 20 mmhg, perform  cranial flexion with chin nod,  cues for no cervical flexion, 10 sec hold x 5 reps, progress to 34 mmhg.     Supine cervical flexion AROM,with chin nod maintained x 10 reps  HEP    Hook lying:  Upper trunk rotation with  B UE rotation (open book), x 5 reps each side HEP    B shoulder flexion with wand  15 reps add HEP     Stand:  B shoulder flexion with wand  10 reps add HEP    Face wall, B UE CKC flexion in neutral to 150,  lift off with low trap setting x 5 each discharge HEP Supine:  Biofeedback cuff at c spine, base 20 mmhg, perform  cranial flexion with chin nod,  cues for no cervical flexion, 10 sec hold x 5 reps, progress to 34 mmhg.     Supine cervical flexion AROM,with chin nod maintained x 10 reps  HEP    Hook lying:  Upper trunk rotation with B UE rotation (open book), x 5 reps each side HEP    B shoulder flexion with wand  15 reps  HEP     Stand:  B shoulder flexion with wand  10 reps  HEP   Stand:  Back to wall:  - cues neutral spine perform wall radha from 90/90 position of Ue's, fingertips contact wall, slides to 130-140 x 10 2 sets        Manual Therapy Supine:  Grade,3,4 R and L GH post an infer GH joint mobes x 30 sec bout each   Supine:  Grade,3,4 R and L GH post an infer GH joint mobes x 30 sec bout each  Grade 2,3 upper cervical rotation mobes to each side   Supine:  Grade,3,4 R and L GH post an infer GH joint mobes x 30 sec bout each  Grade 2,3 upper cervical rotation mobes to each side      Therapeutic Exercise Minutes 10 10 10 30   Neuro Re-Educ Minutes 35 25 20 10   Manual Therapy Minutes 10 10 10    Total Time Of Timed Procedures 55 45 40 40   Total Time Of Service-Based Procedures 0 0 0 0   Total Treatment Time 55 45 40 40   HEP Upper trunk rotation and cervical strength exercises as noted above, HO issued.  Supine:  Grade,3,4 R and L GH post an infer GH joint mobes x 30 sec bout each   Yellow putty for  and digit exercises. Cont with previous program        HEP  Cont with previous program    Charges  TE 2  NMR 1

## 2025-04-14 ENCOUNTER — OFFICE VISIT (OUTPATIENT)
Dept: PHYSICAL THERAPY | Age: 80
End: 2025-04-14
Attending: STUDENT IN AN ORGANIZED HEALTH CARE EDUCATION/TRAINING PROGRAM
Payer: MEDICARE

## 2025-04-14 PROCEDURE — 97110 THERAPEUTIC EXERCISES: CPT

## 2025-04-14 NOTE — PROGRESS NOTES
Patient: Alejandra Green (79 year old, female) Referring Provider:  Insurance:   Diagnosis: S/P cervical spinal fusion (Z98.1)  Neck pain (M54.2)  Anxiety (F41.9)  Imbalance (R26.89)  History of fusion of cervical spine (Z98.1)  Cervical stenosis of spine (M48.02)  Degenerative cervical disc (M50.30) Gerald Alves  MEDICARE   Date of Surgery: 12/18/2025 Next MD visit:  [facility]   Precautions:  None tba Referral Information:    Date of Evaluation: Req: 0, Auth: 0, Exp:     03/17/25 POC Auth Visits:  10       Today's Date   4/14/2025    Subjective  R neck pain ~ same, pain ranges up to 5/10 along R posterior neck, describes occiput to R lateral posterior soft tissue to C/T region.       Pain: 5/10     Objective  shoulder flexion AROM ~ 155-160 each in standing          Assessment  Pt remains with pain at R neck that was present prior to surgery but improved since surgery. Negative palpation, muscle testing at R cervcal spine. Tx today focused on improving shoulder ROM, tissue length restricting shoulder movement as per POC. Tolerated well. Goal 1 not meeting die to fusion, goal 2 met, goal 3 in progress.    Goals (to be met in 10 visits)   -Improve upper and lower cervical spine segment mobility, improve cervical rotation to 45* each side to ease ability to operate car safely.  -Improve cervical spine strength to WNL as noted with biofeedback cuff readings of 30 mmhg or > with cranial flexion, reduce R neck pain and occiput pain to 3/10 so pt can perform sustained posture tasks with little no restrictions.   -Improve pectoralis and latissimus dorsi muscle length to WNL, shoulder strength WFL, so pt can perform overhead arm tasks with little to no difficulty.   -Independent with progressive HEP                                 Plan  check latest HEP, progress to address goals.    Treatment Last 4 Visits  Treatment Day: 8        HEP  Cont with previous program    Charges

## 2025-04-22 ENCOUNTER — OFFICE VISIT (OUTPATIENT)
Dept: PHYSICAL THERAPY | Age: 80
End: 2025-04-22
Attending: STUDENT IN AN ORGANIZED HEALTH CARE EDUCATION/TRAINING PROGRAM
Payer: MEDICARE

## 2025-04-22 PROCEDURE — 97110 THERAPEUTIC EXERCISES: CPT

## 2025-04-22 NOTE — PROGRESS NOTES
Patient: Alejandra Green (79 year old, female) Referring Provider:  Insurance:   Diagnosis: S/P cervical spinal fusion (Z98.1)  Neck pain (M54.2)  Anxiety (F41.9)  Imbalance (R26.89)  History of fusion of cervical spine (Z98.1)  Cervical stenosis of spine (M48.02)  Degenerative cervical disc (M50.30) Gerald Alves  MEDICARE   Date of Surgery: 12/18/2025 Next MD visit:  [facility]   Precautions:  None tba Referral Information:    Date of Evaluation: Req: 0, Auth: 0, Exp:     03/17/25 POC Auth Visits:  10       Today's Date   4/22/2025    Subjective  Pt woke with R head pain, krista horn region headache. Has not had that hapen since surgery. Was a regular symptoms prior to surgery. Has c/o stiff neck and shoulders.       Pain: 2/10     Objective  shoulder flexion AROM L 140  abduction 130  R flexion 150  abduction 150~ 155-160 each in standing. Cervcial spine 35* each          Assessment  Pt reprots recent increase in R headache pain she had priro to surgery. Those symptoms have been reproduced in the clinic prior to surgey and post surgery with mobilizations of the R upper cervical segments. Her c/o general neck and shoulder stiffness is from soft tissue retrictions from lack of activity, sedentary. That was discussed along with increasing HEP frequency. Stiffness complaints resolved, improved shoulder ROM post txx. Cervical rotation is at baseline of 35* each.    Goals (to be met in 10 visits)   -Improve upper and lower cervical spine segment mobility, improve cervical rotation to 45* each side to ease ability to operate car safely.  -Improve cervical spine strength to WNL as noted with biofeedback cuff readings of 30 mmhg or > with cranial flexion, reduce R neck pain and occiput pain to 3/10 so pt can perform sustained posture tasks with little no restrictions.   -Improve pectoralis and latissimus dorsi muscle length to WNL, shoulder strength WFL, so pt can perform overhead arm tasks with little to no  difficulty.   -Independent with progressive HEP                                     Plan  reassess and and make recommendations.    Treatment Last 4 Visits  Treatment Day: 9       4/8/2025 4/10/2025 4/14/2025 4/22/2025   Spine Treatment   Therapeutic Exercise Supine:  R and L shoulder PROM horiz add, IR, ER and flexion  R and L cervical PROM L and R rotation to end range  R and L cervical PROM L and R side bend to end range    Yellow putty with various digit and gripping exercises, add HEP Sit:  UBE B UE CW and CCW 1 min bouts  level 5  6 min     Supine:   Open book stretch x 10 each side cont HEP    B UE in 90/90 position with pillow under each arm , perform overhead slides,    Cervical PROM rotation x 10 each  Cervical flexion with chin nod maintained x 5 2 sets     Stand with back to wall:  :-B UE shoulder flexion AROM with wand x 20 reps cont HEP  -alternating shoulder flexion AROM to 150-160 x 12 each   -alternating elbow flexion with 4 lb wts x 15 each  Sit:  UBE B UE CW and CCW 1 min bouts  level 5  6 min      Supine:   -L and R shoulder PROM IR, ER, flex and ABD x 10 each with end range stretches  Open book stretch x 10 each side cont HEP     Hook lying:  -Single UE in 90/90 position, perform overhead slides to 140 x 5 2 sets each HEP  -B UE in 90/90 position with pillow under each arm , perform overhead slides, x 10 add HEP        Stand with back to wall:  -Single UE in 90/90 position, finger tips contact wall, perform overhead slides to 140 x 5 2 sets each add HEP  -B UE in 90/90 position finger tips contact wall,  perform overhead slides, x 10 add HEP  -alternating shoulder flexion AROM to 150-160 x 10 each add HEP     UBE B UE CW and CCW 1 min bouts  level 5  6 min      Supine:   -L and R shoulder PROM IR, ER, flex and ABD x 10 each with end range stretches  Open book stretch x 10 each side cont HEP     Hook lying:  -B shoulder flexion with wand x 20 reps resume HEP  -Alternating shoulder flexion with 1  lb wt x 15 reps each  -single UE shoulder D2 PNF with 1 lb wt x 10 reps each add HEP  -Single UE in 90/90 position, perform overhead slides to 140 x 10  HEP  -B UE in 90/90 position  perform overhead slides, x 10 HEP      Neuro Re-Education Supine:  Biofeedback cuff at c spine, base 20 mmhg, perform  cranial flexion with chin nod,  cues for no cervical flexion, 10 sec hold x 5 reps, progress to 34 mmhg.     Supine cervical flexion AROM,with chin nod maintained x 10 reps  HEP    Hook lying:  Upper trunk rotation with B UE rotation (open book), x 5 reps each side HEP    B shoulder flexion with wand  15 reps  HEP     Stand:  B shoulder flexion with wand  10 reps  HEP   Stand:  Back to wall:  - cues neutral spine perform wall radha from 90/90 position of Ue's, fingertips contact wall, slides to 130-140 x 10 2 sets          Manual Therapy Supine:  Grade,3,4 R and L GH post an infer GH joint mobes x 30 sec bout each  Grade 2,3 upper cervical rotation mobes to each side        Therapeutic Exercise Minutes 10 30  40   Neuro Re-Educ Minutes 20 10     Manual Therapy Minutes 10      Total Time Of Timed Procedures 40 40 0 40   Total Time Of Service-Based Procedures 0 0 0 0   Total Treatment Time 40 40 0 40   HEP Yellow putty for  and digit exercises. Cont with previous program  Current HEP as noted above HO issued for program . To be done 3 times a day        HEP  Current HEP as noted above HO issued for program . To be done 3 times a day    Charges  TE 3

## 2025-05-08 ENCOUNTER — OFFICE VISIT (OUTPATIENT)
Dept: PHYSICAL THERAPY | Age: 80
End: 2025-05-08
Attending: STUDENT IN AN ORGANIZED HEALTH CARE EDUCATION/TRAINING PROGRAM
Payer: MEDICARE

## 2025-05-08 PROCEDURE — 97110 THERAPEUTIC EXERCISES: CPT

## 2025-05-08 NOTE — PROGRESS NOTES
Patient: Alejandra Green (79 year old, female) Referring Provider:  Insurance:   Diagnosis:   No ref. provider found  MEDICARE   Date of Surgery: No data recorded Next MD visit:  [facility]   Precautions:  None No data recorded Referral Information:    Date of Evaluation: Req: 0, Auth: 0, Exp:     No data recorded POC Auth Visits:  10       Today's Date   5/8/2025    Subjective  Pt reports she is pleased with the outcome of her surgery whihc improved pain by 60%. She remains with upper cervical pain that radiates down R neck. Pain is somewhat constant, ranges for 2-7/10.       Pain: 2/10     Objective  Shoulder flexion AROM L 150  R flexion 160  Abduction 150~ 155-160 each in standing. Cervcial spine 35* each          Assessment  Pt has completed 10 sessions of tx following her cervical spine surgery. She is at MMI with cervcial rotation at 35* each. She remains with R upper cervcial pain with radiating symptoms to lateral/posterior neck and headaches. Those sympotms can be reproduced with mobilizations of R upper cervical segments. Goals were not met for cervcial rotation ROM and R neck pain. Goals met for cervical strength and B UE mobility and strength.    Goals (to be met in 10 visits)   -Improve upper and lower cervical spine segment mobility, improve cervical rotation to 45* each side to ease ability to operate car safely.  -Improve cervical spine strength to WNL as noted with biofeedback cuff readings of 30 mmhg or > with cranial flexion, reduce R neck pain and occiput pain to 3/10 so pt can perform sustained posture tasks with little no restrictions.   -Improve pectoralis and latissimus dorsi muscle length to WNL, shoulder strength WFL, so pt can perform overhead arm tasks with little to no difficulty.   -Independent with progressive HEP                                         Plan  discharge to HEP    Treatment Last 4 Visits  Treatment Day: 10       4/10/2025 4/14/2025 4/22/2025 5/8/2025   Spine  Treatment   Therapeutic Exercise Sit:  UBE B UE CW and CCW 1 min bouts  level 5  6 min     Supine:   Open book stretch x 10 each side cont HEP    B UE in 90/90 position with pillow under each arm , perform overhead slides,    Cervical PROM rotation x 10 each  Cervical flexion with chin nod maintained x 5 2 sets     Stand with back to wall:  :-B UE shoulder flexion AROM with wand x 20 reps cont HEP  -alternating shoulder flexion AROM to 150-160 x 12 each   -alternating elbow flexion with 4 lb wts x 15 each  Sit:  UBE B UE CW and CCW 1 min bouts  level 5  6 min      Supine:   -L and R shoulder PROM IR, ER, flex and ABD x 10 each with end range stretches  Open book stretch x 10 each side cont HEP     Hook lying:  -Single UE in 90/90 position, perform overhead slides to 140 x 5 2 sets each HEP  -B UE in 90/90 position with pillow under each arm , perform overhead slides, x 10 add HEP        Stand with back to wall:  -Single UE in 90/90 position, finger tips contact wall, perform overhead slides to 140 x 5 2 sets each add HEP  -B UE in 90/90 position finger tips contact wall,  perform overhead slides, x 10 add HEP  -alternating shoulder flexion AROM to 150-160 x 10 each add HEP     UBE B UE CW and CCW 1 min bouts  level 5  6 min      Supine:   -L and R shoulder PROM IR, ER, flex and ABD x 10 each with end range stretches  Open book stretch x 10 each side cont HEP     Hook lying:  -B shoulder flexion with wand x 20 reps resume HEP  -Alternating shoulder flexion with 1 lb wt x 15 reps each  -single UE shoulder D2 PNF with 1 lb wt x 10 reps each add HEP  -Single UE in 90/90 position, perform overhead slides to 140 x 10  HEP  -B UE in 90/90 position  perform overhead slides, x 10 HEP    UBE B UE CW and CCW 1 min bouts  level 5  6 min      Supine:   -L and R shoulder PROM IR, ER, flex and ABD x 10 each with end range stretches  Open book stretch x 10 each side cont HEP     Hook lying:  -B shoulder flexion with wand x 20  progressed to 30 reps HEP  -Alternating shoulder flexion with 1 lb wt x 15 progressed to 20 reps each HEP  -single UE shoulder D2 PNF with 1 lb wt x 10 progressed  to 15 reps each  HEP  -B UE in 90/90 position  perform overhead slides, x 10 HEP    Stand with back to wall, fingertips contact wall with UE in 90/90 position, perform overhead slides to 120-130 x 8 reps add HEP      Neuro Re-Education Stand:  Back to wall:  - cues neutral spine perform wall radha from 90/90 position of Ue's, fingertips contact wall, slides to 130-140 x 10 2 sets           Therapeutic Exercise Minutes 30  40 40   Neuro Re-Educ Minutes 10      Total Time Of Timed Procedures 40 0 40 40   Total Time Of Service-Based Procedures 0 0 0 0   Total Treatment Time 40 0 40 40   HEP Cont with previous program  Current HEP as noted above HO issued for program . To be done 3 times a day Final HEP as noted above        HEP  Final HEP as noted above    Charges  TE 3

## 2025-05-13 ENCOUNTER — APPOINTMENT (OUTPATIENT)
Dept: PHYSICAL THERAPY | Age: 80
End: 2025-05-13
Attending: STUDENT IN AN ORGANIZED HEALTH CARE EDUCATION/TRAINING PROGRAM
Payer: MEDICARE

## 2025-06-10 ENCOUNTER — HOSPITAL ENCOUNTER (OUTPATIENT)
Dept: GENERAL RADIOLOGY | Age: 80
Discharge: HOME | End: 2025-06-10
Attending: STUDENT IN AN ORGANIZED HEALTH CARE EDUCATION/TRAINING PROGRAM
Payer: MEDICARE

## 2025-06-10 DIAGNOSIS — M50.30 DEGENERATIVE CERVICAL DISC: ICD-10-CM

## 2025-06-10 DIAGNOSIS — F41.9 ANXIETY: ICD-10-CM

## 2025-06-10 DIAGNOSIS — Z98.1 S/P CERVICAL SPINAL FUSION: ICD-10-CM

## 2025-06-10 DIAGNOSIS — R26.89 IMBALANCE: ICD-10-CM

## 2025-06-10 DIAGNOSIS — M48.02 CERVICAL STENOSIS OF SPINE: ICD-10-CM

## 2025-06-10 DIAGNOSIS — Z98.1 HISTORY OF FUSION OF CERVICAL SPINE: ICD-10-CM

## 2025-06-10 DIAGNOSIS — M54.2 NECK PAIN: ICD-10-CM

## 2025-06-10 PROCEDURE — 72040 X-RAY EXAM NECK SPINE 2-3 VW: CPT | Performed by: STUDENT IN AN ORGANIZED HEALTH CARE EDUCATION/TRAINING PROGRAM

## 2025-06-10 RX ORDER — NITROFURANTOIN 25; 75 MG/1; MG/1
100 CAPSULE ORAL 2 TIMES DAILY WITH MEALS
COMMUNITY
Start: 2025-05-12

## 2025-06-10 RX ORDER — PHENAZOPYRIDINE HYDROCHLORIDE 200 MG/1
TABLET, FILM COATED ORAL
COMMUNITY
Start: 2025-05-12

## 2025-06-10 RX ORDER — DULOXETIN HYDROCHLORIDE 30 MG/1
CAPSULE, DELAYED RELEASE ORAL
COMMUNITY
Start: 2025-03-27

## 2025-06-10 NOTE — PATIENT INSTRUCTIONS
Refill policies:    Allow 2-3 business days for refills; controlled substances may take longer.  Contact your pharmacy at least 5 days prior to running out of medication and have them send an electronic request or submit request through the “request refill” option in your [employer] account.  Refills are not addressed on weekends; covering physicians do not authorize routine medications on weekends.  No narcotics or controlled substances are refilled after noon on Fridays or by on call physicians.  By law, narcotics must be electronically prescribed.  A 30 day supply with no refills is the maximum allowed.  If your prescription is due for a refill, you may be due for a follow up appointment.  To best provide you care, patients receiving routine medications need to be seen at least once a year.  Patients receiving narcotic/controlled substance medications need to be seen at least once every 3 months.  In the event that your preferred pharmacy does not have the requested medication in stock (e.g. Backordered), it is your responsibility to find another pharmacy that has the requested medication available.  We will gladly send a new prescription to that pharmacy at your request.    Scheduling Tests:    If your physician has ordered radiology tests such as MRI or CT scans, please contact Central Scheduling at [phone] right away to schedule the test.  Once scheduled, the [facility] Centralized Referral Team will work with your insurance carrier to obtain pre-certification or prior authorization.  Depending on your insurance carrier, approval may take 3-10 days.  It is highly recommended patients assure they have received an authorization before having a test performed.  If test is done without insurance authorization, patient may be responsible for the entire amount billed.      Precertification and Prior Authorizations:  If your physician has recommended that you have a procedure or additional testing performed the [facility]  Centralized Referral Team will contact your insurance carrier to obtain pre-certification or prior authorization.    You are strongly encouraged to contact your insurance carrier to verify that your procedure/test has been approved and is a COVERED benefit.  Although the [facility] Centralized Referral Team does its due diligence, the insurance carrier gives the disclaimer that \"Although the procedure is authorized, this does not guarantee payment.\"    Ultimately the patient is responsible for payment.   Thank you for your understanding in this matter.  Paperwork Completion:  If you require FMLA or disability paperwork for your recovery, please make sure to either drop it off or have it faxed to our office at [phone]. Be sure the form has your name and date of birth on it.  The form will be faxed to our Forms Department and they will complete it for you.  There is a 25$ fee for all forms that need to be filled out.  Please be aware there is a 10-14 day turnaround time.  You will need to sign a release of information (DIPIKA) form if your paperwork does not come with one.  You may call the Forms Department with any questions at [phone].  Their fax number is [phone].

## 2025-06-11 ENCOUNTER — OFFICE VISIT (OUTPATIENT)
Dept: SURGERY | Facility: CLINIC | Age: 80
End: 2025-06-11
Payer: MEDICARE

## 2025-06-11 VITALS
DIASTOLIC BLOOD PRESSURE: 78 MMHG | WEIGHT: 160 LBS | BODY MASS INDEX: 25.11 KG/M2 | OXYGEN SATURATION: 95 % | HEART RATE: 69 BPM | HEIGHT: 67 IN | SYSTOLIC BLOOD PRESSURE: 146 MMHG

## 2025-06-11 DIAGNOSIS — R29.898 RIGHT HAND WEAKNESS: ICD-10-CM

## 2025-06-11 DIAGNOSIS — Z98.1 S/P CERVICAL SPINAL FUSION: Primary | ICD-10-CM

## 2025-06-11 DIAGNOSIS — M50.30 DEGENERATIVE CERVICAL DISC: ICD-10-CM

## 2025-06-11 DIAGNOSIS — M48.02 CERVICAL STENOSIS OF SPINE: ICD-10-CM

## 2025-06-11 DIAGNOSIS — F41.9 ANXIETY: ICD-10-CM

## 2025-06-11 DIAGNOSIS — R51.9 OCCIPITAL PAIN: ICD-10-CM

## 2025-06-11 DIAGNOSIS — M54.2 NECK PAIN: ICD-10-CM

## 2025-06-11 DIAGNOSIS — R26.89 IMBALANCE: ICD-10-CM

## 2025-06-11 DIAGNOSIS — Z98.1 HISTORY OF FUSION OF CERVICAL SPINE: ICD-10-CM

## 2025-06-11 PROCEDURE — 99214 OFFICE O/P EST MOD 30 MIN: CPT | Performed by: STUDENT IN AN ORGANIZED HEALTH CARE EDUCATION/TRAINING PROGRAM

## 2025-06-11 PROCEDURE — G2211 COMPLEX E/M VISIT ADD ON: HCPCS | Performed by: STUDENT IN AN ORGANIZED HEALTH CARE EDUCATION/TRAINING PROGRAM
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
## 2025-06-11 NOTE — PROGRESS NOTES
The following individual(s) verbally consented to be recorded using ambient AI listening technology and understand that they can each withdraw their consent to this listening technology at any point by asking the clinician to turn off or pause the recording:    Patient name: Alejandra Green  Additional names:  Abdullahi Green     Established patient presents to clinic for a 6 month follow up from Transcervical exposure of prior Cervical 4 to 5 fusion, anterior Cervical 3 to 4 and 5 to 6 discectomies and fusion approaching from the left. Patient states 1 out of the 3 pains she has mentioned persists. She states her scalp/back of the head pain persists. She takes Tylenol for pain relief because she states she is on Eliquis and cannot take pain medications. She completed PT and it helped with her neck tension but she has a muscle strain in the back of th neck.

## (undated) DEVICE — SPONGE 4X4 10PK

## (undated) DEVICE — FRAZIER SUCTION INSTRUMENT 10 FR W/CONTROL VENT & OBTURATOR: Brand: FRAZIER

## (undated) DEVICE — DISPOSABLE SLIM BIPOLAR FORCEPS, NON-STICK,: Brand: SPETZLER-MALIS

## (undated) DEVICE — MONITORING NEUROPHYSIOLOGICAL

## (undated) DEVICE — SOLUTION IRRIG 1000ML 0.9% NACL USP BTL

## (undated) DEVICE — KIT EVAC 400CC DIA1/8IN H PAT 12.5IN 3 SPR

## (undated) DEVICE — TZ MEDICAL TITANIUM DISTRACTION PINS 14MM: Brand: TZ MEDICAL TITANIUM DISTRACTION PINS

## (undated) DEVICE — KIT HEMSTAT MTRX 8ML PORCINE GEL HUM THROM

## (undated) DEVICE — AEGIS 1" DISK 4MM HOLE, PEEL OPEN: Brand: MEDLINE

## (undated) DEVICE — SUT COAT VCRL+ 3-0 18IN CP-2 ABSRB UD ANTIBAC

## (undated) DEVICE — MICRO KOVER: Brand: UNBRANDED

## (undated) DEVICE — FRAZIER SUCTION INSTRUMENT 12 FR W/CONTROL VENT & OBTURATOR: Brand: FRAZIER

## (undated) DEVICE — SERVICE TRIMLINE SURGICAL USE

## (undated) DEVICE — INTENDED USE FOR SURGICAL MARKING ON INTACT SKIN, ALSO PROVIDES A PERMANENT METHOD OF IDENTIFYING OBJECTS IN THE OPERATING ROOM: Brand: WRITESITE® PLUS MINI PREP RESISTANT MARKER

## (undated) DEVICE — NEURO SPONGES: Brand: DEROYAL

## (undated) DEVICE — TRAY CATH 16FR F INCL BARDX IC COMPLT CARE

## (undated) DEVICE — C-ARMOR C-ARM EQUIPMENT COVERS CLEAR STERILE UNIVERSAL FIT 12 PER CASE: Brand: C-ARMOR

## (undated) DEVICE — UNDYED BRAIDED (POLYGLACTIN 910), SYNTHETIC ABSORBABLE SUTURE: Brand: COATED VICRYL

## (undated) DEVICE — GLOVE SUR 7.5 SENSICARE PI MIC PIP CRM PWD F

## (undated) DEVICE — SLIM BODY SKIN STAPLER: Brand: APPOSE ULC

## (undated) DEVICE — PACK,UNIVERSAL,NO GOWNS: Brand: MEDLINE

## (undated) DEVICE — SPONGE: SPECIALTY PEANUT XR 100/CS: Brand: MEDICAL ACTION INDUSTRIES

## (undated) DEVICE — INSULATED BLADE ELECTRODE: Brand: EDGE

## (undated) DEVICE — 3.0MM PRECISION NEURO (MATCH HEAD)

## (undated) DEVICE — INS 8530605 TRI FLAT DRILL BIT STERILE

## (undated) DEVICE — GLOVE SUR 8 SENSICARE PI MIC PIP CRM PWD F

## (undated) DEVICE — GLOVE SUR 8 SENSICARE NEOPR PWD F

## (undated) DEVICE — APPLICATOR PREP 26ML CHG 2% ISO ALC 70%

## (undated) DEVICE — SET CERV CLLR AD UNIV COT LN PD HT ADJ DIAL

## (undated) DEVICE — CERVICAL CDS: Brand: MEDLINE INDUSTRIES, INC.

## (undated) NOTE — LETTER
WINTER Notifier: Mafengwo. Patient Name: Alejandra Green Identification Number: [id number]                          Advance Beneficiary Notice of Noncoverage (ABN)   NOTE:  If Medicare doesn’t pay for D. item/service(s) below, you may have to pay.  Medicare does not pay for everything, even some care that you or your health care provider have good reason to think you need. We expect Medicare may not pay for the D. item/service(s) below.  D. Items or Services  Greater occipital nerve block E. Reason Medicare May Not Pay: F. Estimated Cost   __ EKG ($87.00)  __ Pap smear ($101) __Pelvic/Breast ($147.00)  __ Ear Irrigation ($138)  _x_ Injection(s)  ___ Tdap ($181)       ___ Meningitis ($290)   __Prevnar ($555)  ___ Td ($66)              ___ Prevnar 20 ($549)  ___ Hep A ($152)     ___ Prolia ($1827)         __ Xiaflex ($            )   ___ Hep B ($150)     ___ Pneumovax ($287)                                         ___ Vaccine Administration ($65)   __ Medicare does not cover this service      __ Medicare may not pay for this   item/service for your condition     __ Medicare may not pay for this item/service as often as this        WHAT YOU NEED TO DO NOW:  Read this notice, so you can make an informed decision about your care.  Ask us any questions that you may have after you finish reading.  Choose an option below about whether to receive the D. item/service(s) listed above.  Note: If you choose Option 1 or 2, we may help you to use any other insurance that you might have, but Medicare cannot require us to do this.  G. OPTIONS: Check only one box.  We cannot choose a box for you.   OPTION 1. I want the D. item/service(s) listed above. You may ask to be paid now, but I also want Medicare billed for an official decision on payment, which is sent to me on a Medicare Summary Notice (MSN). I understand that if Medicare doesn’t pay, I am responsible for payment, but I can appeal to Medicare by following  the directions on the MSN. If Medicare does pay, you will refund any payments I made to you, less co-pays or deductibles.  OPTION 2. I want the D. item/service(s) listed above, but do not bill Medicare. You may ask to be paid now as I am responsible for payment. I cannot appeal if Medicare is not billed.  OPTION 3. I don't want the D. item/service(s) listed above. I understand with this choice I am not responsible for payment, and I cannot appeal to see if Medicare would pay.    H. Additional Information:    This notice gives our opinion, not an official Medicare decision. If you have other questions on this notice or Medicare billing, call 1-800-MEDICARE (1-825.671.5040/TTY: 1-773.722.8046). Signing below means that you have received and understand this notice. You also receive a copy.  I. Signature: J. Date:       You have the right to get Medicare information in an accessible format, like large print, Braille, or audio. You also have the right to file a complaint if you feel you’ve been discriminated against. Visit Medicare.gov/about- us/mhzoahxphmqpg-suaooqluqcmrehfau-vlhmal.  According to the Paperwork Reduction Act of 1995, no persons are required to respond to a collection of information unless it displays a valid OMB control number. The valid OMB control number for this information collection is 0840-8497. The time required to complete this information collection is estimated to average 7 minutes per response, including the time to review instructions, search existing data resources, gather the data needed, and complete and review the information collection. If you have comments concerning the accuracy of the time estimate or suggestions for improving this form, please write to: CMS, [address]     Sharon Attn: MATTEO Reports Clearance Officer, [place], [place] [address].  Form CMS-R-131 (Exp. 1/31/2026) Form Approved OMB No. [id number]

## (undated) NOTE — LETTER
11/01/24    Alejandra Green   7/22/1945           Dear Dr. Avendaño,    This letter is to inform you that your patient is being scheduled for surgery with Dr. Alves on 12/10/24 at [facility]. We have asked the patient to contact your office to schedule an appointment for cardiac clearance.    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 Please address patient's active problems (i.e high blood pressure, breathing issues etc.).    Pre-op labs will be done through the [facility] Pre-Admission department or through the PCP office.      Please fax clearance letter/office visit note to our office at fax #: [phone] with determination of clearance status.      If you have any questions, you may contact our office at [phone], option # 2.    Thank you,     Clinical Staff Nurse  Neurosurgery, Neuro Interventional Radiology   [facility] [place]  [email or web address]  [address], [place] [address], [place] [address]

## (undated) NOTE — LETTER
AUTHORIZATION FOR SURGICAL OPERATION OR OTHER PROCEDURE    1. I hereby authorize Dr. Real Razo and the [facility] staff assigned to my case to perform the following operation and/or procedure at the [facility]:    Greater occipital nerve block     2.  My physician has explained the nature and purpose of the operation or other procedure, possible alternative methods of treatment, the risks involved, and the possibility of complication to me.  I acknowledge that no guarantee has been made as to the result that may be obtained.  3.  I recognize that, during the course of this operation, or other procedure, unforseen conditions may necessitate additional or different procedure than those listed above.  I, therefore, further authorize and request that the above named physician, his/her physician assistants or designees perform such procedures as are, in his/her professional opinion, necessary and desirable.  4.  Any tissue or organs removed in the operation or other procedure may be disposed of by and at the discretion of the [facility] staff and [facility].  5.  I understand that in the event of a medical emergency, I will be transported by local paramedics to [facility] or other hospital emergency department.  6.  I certify that I have read and fully understand the above consent to operation and/or other procedure.    7.  I acknowledge that my physician has explained sedation/analgesia administration to me including the risks and benefits.  I consent to the administration of sedation/analgesia as may be necessary or desirable in the judgement of my physician.    Witness signature: ___________________________________________________ Date:  ______/______/_____                    Time:  ________ A.M.  P.M.       Patient Name: Alejandra Green  7/22/1945  [id number]         Patient signature:  ___________________________________________________          Statement of Physician  My signature  below affirms that prior to the time of the procedure, I have explained to the patient and/or his/her guardian, the risks and benefits involved in the proposed treatment and any reasonable alternative to the proposed treatment.  I have also explained the risks and benefits involved in the refusal of the proposed treatment and have answered the patient's questions.                        Date:  ______/______/_______  Provider                      Signature:  __________________________________________________________       Time:  ___________ A.M    P.M.

## (undated) NOTE — LETTER
7/3/2024      Real Razo MD  Physical Medicine and Rehabilitation  [address] [place] [address], [address]  [facility] [address]  Dept: [phone]  Dept Fax: [phone]        RE: Consultation for Alejandra Green        Dear Erin Lyles MD,    Thank you very much for the opportunity to see your patient.  Attached please find a summary from your patient's recent visit.     I appreciate the chance to take care of your patient with you.  Please feel free to call me with any questions or concerns.    Sincerely,        Real Razo MD  Electronically Signed on 7/3/2024

## (undated) NOTE — LETTER
24      RE: Alejandra WAGGONER Green     : 1945    Dear Dr. Lyles ,    This letter is to inform you that your patient has been scheduled for surgery with Dr. Alves on 12/10/24 at [facility]. Pre-operative clearance has been requested within 30 days of surgery.  We have asked the patient to contact your office to schedule a pre-operative visit.      Diagnosis: Neck Pain, Occipital pain, Right hand weakness, Imbalance, History of      fusion of cervical spine, Cervical stenosis of spine, Degenerative cervical disc  Procedure: TRANSCERVICAL EXPOSURE OF PRIOR CERVICAL 4 TO 5 FUSION, ANTERIOR CERVICAL 3 TO 4 AND 5 TO 6 DISCECTOMIES AND FUSIONS    A Pre-operative History & Physical is needed for medical clearance within 30 days of surgery. Please address patient's active problems and potential risks of having surgery considering their medical history.  Pre-op labs are scheduled through the [facility] Pre-Admission department. If any labs/testing are being done through the PCP office, then results should be faxed to the pre-admission testing department at [facility] at [phone]. Our pre-operative lab orders are located in our surgery order, if the patient would like these done through your office, you will need to place separate orders.     Please fax clearance letter/office visit note to our office at fax #: [phone]. Your office note must clearly indicate if the patient is medically cleared for surgery or not.    The following orders will be placed by pre-admission testing:  CBC  CMP  Type and Screen   PT/PTT/INR  MRSA/MSSA Nasal Swab  EKG, CXR  (*And any other pertinent testing based on patient's current clinical condition.)    If you have any questions, you may contact our office at [phone], option # 2.    Thank you,     Clinical Staff Nurse  Neurosurgery, Neuro Interventional Radiology   [facility] [place]  [email or web address]

## (undated) NOTE — LETTER
10/2/2024      Real Razo MD  Physical Medicine and Rehabilitation  [address] [place] [address], [address]  [facility] [address]  Dept: [phone]  Dept Fax: [phone]        RE: Consultation for Alejandra Green        Dear Erin Lyles MD,    Thank you very much for the opportunity to see your patient.  Attached please find a summary from your patient's recent visit.     I appreciate the chance to take care of your patient with you.  Please feel free to call me with any questions or concerns.    Sincerely,        Real Razo MD  Electronically Signed on 10/2/2024

## (undated) NOTE — LETTER
3/20/2024      Real Razo MD  Physical Medicine and Rehabilitation  [address] [place] [address], [address]  [facility] [address]  Dept: [phone]  Dept Fax: [phone]        RE: Consultation for Alejandra Green        Dear Erin Lyles MD,    Thank you very much for the opportunity to see your patient.  Attached please find a summary from your patient's recent visit.     I appreciate the chance to take care of your patient with you.  Please feel free to call me with any questions or concerns.    Sincerely,        Real Razo MD  Electronically Signed on 3/20/2024